# Patient Record
Sex: FEMALE | Race: WHITE | Employment: OTHER | URBAN - NONMETROPOLITAN AREA
[De-identification: names, ages, dates, MRNs, and addresses within clinical notes are randomized per-mention and may not be internally consistent; named-entity substitution may affect disease eponyms.]

---

## 2017-01-09 ENCOUNTER — TELEPHONE (OUTPATIENT)
Dept: FAMILY MEDICINE CLINIC | Age: 67
End: 2017-01-09

## 2017-01-10 ENCOUNTER — OFFICE VISIT (OUTPATIENT)
Dept: FAMILY MEDICINE CLINIC | Age: 67
End: 2017-01-10

## 2017-01-10 VITALS
RESPIRATION RATE: 18 BRPM | OXYGEN SATURATION: 95 % | TEMPERATURE: 97.7 F | BODY MASS INDEX: 33.01 KG/M2 | SYSTOLIC BLOOD PRESSURE: 132 MMHG | DIASTOLIC BLOOD PRESSURE: 82 MMHG | HEIGHT: 68 IN | WEIGHT: 217.8 LBS | HEART RATE: 71 BPM

## 2017-01-10 DIAGNOSIS — J01.41 ACUTE RECURRENT PANSINUSITIS: Primary | ICD-10-CM

## 2017-01-10 DIAGNOSIS — J40 BRONCHITIS: ICD-10-CM

## 2017-01-10 DIAGNOSIS — R05.3 CHRONIC COUGH: ICD-10-CM

## 2017-01-10 PROCEDURE — 99213 OFFICE O/P EST LOW 20 MIN: CPT | Performed by: FAMILY MEDICINE

## 2017-01-10 RX ORDER — MULTIVIT WITH MINERALS/LUTEIN
1000 TABLET ORAL EVERY OTHER DAY
COMMUNITY
End: 2018-05-29

## 2017-01-10 RX ORDER — PREDNISONE 20 MG/1
40 TABLET ORAL DAILY
Qty: 10 TABLET | Refills: 0 | Status: SHIPPED | OUTPATIENT
Start: 2017-01-10 | End: 2017-01-15

## 2017-01-10 RX ORDER — AMOXICILLIN 500 MG/1
500 CAPSULE ORAL 2 TIMES DAILY
Qty: 20 CAPSULE | Refills: 0 | Status: SHIPPED | OUTPATIENT
Start: 2017-01-10 | End: 2017-04-24 | Stop reason: ALTCHOICE

## 2017-01-10 ASSESSMENT — ENCOUNTER SYMPTOMS
CONSTIPATION: 0
SHORTNESS OF BREATH: 1
CHOKING: 0
COUGH: 1
DIARRHEA: 0
SINUS PRESSURE: 1
CHEST TIGHTNESS: 0
NAUSEA: 0
WHEEZING: 1
SORE THROAT: 1
TROUBLE SWALLOWING: 0

## 2017-04-24 ENCOUNTER — OFFICE VISIT (OUTPATIENT)
Dept: PRIMARY CARE CLINIC | Age: 67
End: 2017-04-24
Payer: MEDICARE

## 2017-04-24 VITALS
SYSTOLIC BLOOD PRESSURE: 120 MMHG | HEIGHT: 68 IN | RESPIRATION RATE: 16 BRPM | WEIGHT: 202 LBS | TEMPERATURE: 97.8 F | HEART RATE: 74 BPM | OXYGEN SATURATION: 94 % | BODY MASS INDEX: 30.62 KG/M2 | DIASTOLIC BLOOD PRESSURE: 80 MMHG

## 2017-04-24 DIAGNOSIS — G43.101 MIGRAINE WITH AURA AND WITH STATUS MIGRAINOSUS, NOT INTRACTABLE: Primary | ICD-10-CM

## 2017-04-24 PROCEDURE — 96372 THER/PROPH/DIAG INJ SC/IM: CPT | Performed by: NURSE PRACTITIONER

## 2017-04-24 PROCEDURE — 99213 OFFICE O/P EST LOW 20 MIN: CPT | Performed by: NURSE PRACTITIONER

## 2017-04-24 RX ORDER — PROMETHAZINE HYDROCHLORIDE 25 MG/ML
25 INJECTION, SOLUTION INTRAMUSCULAR; INTRAVENOUS ONCE
Status: COMPLETED | OUTPATIENT
Start: 2017-04-24 | End: 2017-04-24

## 2017-04-24 RX ORDER — KETOROLAC TROMETHAMINE 30 MG/ML
60 INJECTION, SOLUTION INTRAMUSCULAR; INTRAVENOUS ONCE
Status: COMPLETED | OUTPATIENT
Start: 2017-04-24 | End: 2017-04-24

## 2017-04-24 RX ADMIN — PROMETHAZINE HYDROCHLORIDE 25 MG: 25 INJECTION, SOLUTION INTRAMUSCULAR; INTRAVENOUS at 17:56

## 2017-04-24 RX ADMIN — KETOROLAC TROMETHAMINE 60 MG: 30 INJECTION, SOLUTION INTRAMUSCULAR; INTRAVENOUS at 17:55

## 2017-04-24 ASSESSMENT — ENCOUNTER SYMPTOMS
RESPIRATORY NEGATIVE: 1
NAUSEA: 1
PHOTOPHOBIA: 1

## 2017-06-07 ENCOUNTER — OFFICE VISIT (OUTPATIENT)
Dept: FAMILY MEDICINE CLINIC | Age: 67
End: 2017-06-07
Payer: MEDICARE

## 2017-06-07 VITALS
BODY MASS INDEX: 31.67 KG/M2 | HEART RATE: 61 BPM | HEIGHT: 68 IN | DIASTOLIC BLOOD PRESSURE: 62 MMHG | SYSTOLIC BLOOD PRESSURE: 96 MMHG | WEIGHT: 209 LBS | OXYGEN SATURATION: 98 % | TEMPERATURE: 98.1 F

## 2017-06-07 DIAGNOSIS — Z12.31 ENCOUNTER FOR SCREENING MAMMOGRAM FOR MALIGNANT NEOPLASM OF BREAST: ICD-10-CM

## 2017-06-07 DIAGNOSIS — E55.9 VITAMIN D DEFICIENCY: ICD-10-CM

## 2017-06-07 DIAGNOSIS — Z13.6 SCREENING FOR CARDIOVASCULAR CONDITION: ICD-10-CM

## 2017-06-07 DIAGNOSIS — R73.01 IMPAIRED FASTING GLUCOSE: ICD-10-CM

## 2017-06-07 DIAGNOSIS — Z91.81 AT HIGH RISK FOR FALLS: ICD-10-CM

## 2017-06-07 DIAGNOSIS — J30.1 SEASONAL ALLERGIC RHINITIS DUE TO POLLEN: ICD-10-CM

## 2017-06-07 DIAGNOSIS — Z13.820 SCREENING FOR OSTEOPOROSIS: ICD-10-CM

## 2017-06-07 DIAGNOSIS — K21.9 GASTROESOPHAGEAL REFLUX DISEASE, ESOPHAGITIS PRESENCE NOT SPECIFIED: Primary | ICD-10-CM

## 2017-06-07 PROCEDURE — 3014F SCREEN MAMMO DOC REV: CPT | Performed by: FAMILY MEDICINE

## 2017-06-07 PROCEDURE — G8427 DOCREV CUR MEDS BY ELIG CLIN: HCPCS | Performed by: FAMILY MEDICINE

## 2017-06-07 PROCEDURE — G8400 PT W/DXA NO RESULTS DOC: HCPCS | Performed by: FAMILY MEDICINE

## 2017-06-07 PROCEDURE — 1123F ACP DISCUSS/DSCN MKR DOCD: CPT | Performed by: FAMILY MEDICINE

## 2017-06-07 PROCEDURE — 3017F COLORECTAL CA SCREEN DOC REV: CPT | Performed by: FAMILY MEDICINE

## 2017-06-07 PROCEDURE — G0009 ADMIN PNEUMOCOCCAL VACCINE: HCPCS | Performed by: FAMILY MEDICINE

## 2017-06-07 PROCEDURE — 4040F PNEUMOC VAC/ADMIN/RCVD: CPT | Performed by: FAMILY MEDICINE

## 2017-06-07 PROCEDURE — 99214 OFFICE O/P EST MOD 30 MIN: CPT | Performed by: FAMILY MEDICINE

## 2017-06-07 PROCEDURE — 90670 PCV13 VACCINE IM: CPT | Performed by: FAMILY MEDICINE

## 2017-06-07 PROCEDURE — 1036F TOBACCO NON-USER: CPT | Performed by: FAMILY MEDICINE

## 2017-06-07 PROCEDURE — 1090F PRES/ABSN URINE INCON ASSESS: CPT | Performed by: FAMILY MEDICINE

## 2017-06-07 PROCEDURE — G8417 CALC BMI ABV UP PARAM F/U: HCPCS | Performed by: FAMILY MEDICINE

## 2017-06-07 RX ORDER — MONTELUKAST SODIUM 10 MG/1
10 TABLET ORAL NIGHTLY
Qty: 30 TABLET | Refills: 3 | Status: SHIPPED | OUTPATIENT
Start: 2017-06-07 | End: 2017-10-20 | Stop reason: SDUPTHER

## 2017-06-07 RX ORDER — MULTIVITAMIN WITH IRON
250 TABLET ORAL DAILY
COMMUNITY
End: 2018-05-29

## 2017-06-07 RX ORDER — ASCORBIC ACID 500 MG
500 TABLET ORAL DAILY
Status: ON HOLD | COMMUNITY
End: 2019-03-12 | Stop reason: HOSPADM

## 2017-06-07 RX ORDER — ASCORBIC ACID 1000 MG
TABLET ORAL
COMMUNITY
End: 2018-05-29

## 2017-06-07 ASSESSMENT — ENCOUNTER SYMPTOMS
CHOKING: 0
WHEEZING: 0
NAUSEA: 0
SHORTNESS OF BREATH: 0
ABDOMINAL PAIN: 0
TROUBLE SWALLOWING: 0
COUGH: 1
DIARRHEA: 0
CONSTIPATION: 0
CHEST TIGHTNESS: 0
SINUS PRESSURE: 1
SORE THROAT: 1

## 2017-06-20 DIAGNOSIS — E78.2 MIXED HYPERLIPIDEMIA: Primary | ICD-10-CM

## 2017-07-31 ENCOUNTER — TELEPHONE (OUTPATIENT)
Dept: FAMILY MEDICINE CLINIC | Age: 67
End: 2017-07-31

## 2017-09-19 ENCOUNTER — TELEPHONE (OUTPATIENT)
Dept: PRIMARY CARE CLINIC | Age: 67
End: 2017-09-19

## 2017-09-29 ENCOUNTER — HOSPITAL ENCOUNTER (OUTPATIENT)
Age: 67
Setting detail: SPECIMEN
Discharge: HOME | End: 2017-09-29
Payer: MEDICARE

## 2017-09-29 ENCOUNTER — OFFICE VISIT (OUTPATIENT)
Dept: FAMILY MEDICINE CLINIC | Age: 67
End: 2017-09-29
Payer: MEDICARE

## 2017-09-29 VITALS
OXYGEN SATURATION: 97 % | WEIGHT: 207 LBS | TEMPERATURE: 98.4 F | HEART RATE: 77 BPM | BODY MASS INDEX: 31.37 KG/M2 | DIASTOLIC BLOOD PRESSURE: 70 MMHG | HEIGHT: 68 IN | SYSTOLIC BLOOD PRESSURE: 118 MMHG

## 2017-09-29 DIAGNOSIS — R19.7 DIARRHEA OF PRESUMED INFECTIOUS ORIGIN: ICD-10-CM

## 2017-09-29 DIAGNOSIS — R10.2 SUPRAPUBIC PAIN, ACUTE: ICD-10-CM

## 2017-09-29 DIAGNOSIS — F33.1 MODERATE EPISODE OF RECURRENT MAJOR DEPRESSIVE DISORDER (HCC): ICD-10-CM

## 2017-09-29 DIAGNOSIS — R10.2 SUPRAPUBIC PAIN, ACUTE: Primary | ICD-10-CM

## 2017-09-29 DIAGNOSIS — N30.01 ACUTE CYSTITIS WITH HEMATURIA: Primary | ICD-10-CM

## 2017-09-29 LAB
-: ABNORMAL
AMORPHOUS: ABNORMAL
BACTERIA: ABNORMAL
BILIRUBIN URINE: ABNORMAL
CASTS UA: ABNORMAL /LPF (ref 0–2)
COLOR: ABNORMAL
COMMENT UA: ABNORMAL
CRYSTALS, UA: ABNORMAL /HPF
EPITHELIAL CELLS UA: ABNORMAL /HPF (ref 0–5)
GLUCOSE URINE: NEGATIVE
KETONES, URINE: ABNORMAL
LEUKOCYTE ESTERASE, URINE: ABNORMAL
MUCUS: ABNORMAL
NITRITE, URINE: NEGATIVE
OTHER OBSERVATIONS UA: ABNORMAL
PH UA: 5.5 (ref 5–6)
PROTEIN UA: ABNORMAL
RBC UA: ABNORMAL /HPF (ref 0–4)
RENAL EPITHELIAL, UA: ABNORMAL /HPF
SPECIFIC GRAVITY UA: 1.03 (ref 1.01–1.02)
TRICHOMONAS: ABNORMAL
TURBIDITY: ABNORMAL
URINE HGB: ABNORMAL
UROBILINOGEN, URINE: NORMAL
WBC UA: ABNORMAL /HPF (ref 0–4)
YEAST: ABNORMAL

## 2017-09-29 PROCEDURE — 81001 URINALYSIS AUTO W/SCOPE: CPT

## 2017-09-29 PROCEDURE — 1036F TOBACCO NON-USER: CPT | Performed by: FAMILY MEDICINE

## 2017-09-29 PROCEDURE — 1123F ACP DISCUSS/DSCN MKR DOCD: CPT | Performed by: FAMILY MEDICINE

## 2017-09-29 PROCEDURE — 3014F SCREEN MAMMO DOC REV: CPT | Performed by: FAMILY MEDICINE

## 2017-09-29 PROCEDURE — 3017F COLORECTAL CA SCREEN DOC REV: CPT | Performed by: FAMILY MEDICINE

## 2017-09-29 PROCEDURE — G8417 CALC BMI ABV UP PARAM F/U: HCPCS | Performed by: FAMILY MEDICINE

## 2017-09-29 PROCEDURE — G8427 DOCREV CUR MEDS BY ELIG CLIN: HCPCS | Performed by: FAMILY MEDICINE

## 2017-09-29 PROCEDURE — 4040F PNEUMOC VAC/ADMIN/RCVD: CPT | Performed by: FAMILY MEDICINE

## 2017-09-29 PROCEDURE — 99214 OFFICE O/P EST MOD 30 MIN: CPT | Performed by: FAMILY MEDICINE

## 2017-09-29 PROCEDURE — G8400 PT W/DXA NO RESULTS DOC: HCPCS | Performed by: FAMILY MEDICINE

## 2017-09-29 PROCEDURE — 1090F PRES/ABSN URINE INCON ASSESS: CPT | Performed by: FAMILY MEDICINE

## 2017-09-29 RX ORDER — NITROFURANTOIN 25; 75 MG/1; MG/1
100 CAPSULE ORAL 2 TIMES DAILY
Qty: 14 CAPSULE | Refills: 0 | Status: SHIPPED | OUTPATIENT
Start: 2017-09-29 | End: 2017-10-06

## 2017-09-29 RX ORDER — FLUOXETINE 10 MG/1
10 CAPSULE ORAL DAILY
Qty: 30 CAPSULE | Refills: 1 | Status: SHIPPED | OUTPATIENT
Start: 2017-09-29 | End: 2017-10-31 | Stop reason: SDUPTHER

## 2017-09-29 RX ORDER — CHOLESTYRAMINE 4 G/9G
1 POWDER, FOR SUSPENSION ORAL 2 TIMES DAILY
Qty: 90 PACKET | Refills: 3 | Status: SHIPPED | OUTPATIENT
Start: 2017-09-29 | End: 2017-10-31

## 2017-09-29 ASSESSMENT — ENCOUNTER SYMPTOMS
ABDOMINAL PAIN: 1
WHEEZING: 0
SHORTNESS OF BREATH: 0
HEMATOCHEZIA: 0
COUGH: 0
VOMITING: 1
CONSTIPATION: 0
DIARRHEA: 1
CHEST TIGHTNESS: 0
NAUSEA: 1

## 2017-10-03 ENCOUNTER — TELEPHONE (OUTPATIENT)
Dept: FAMILY MEDICINE CLINIC | Age: 67
End: 2017-10-03

## 2017-10-03 ENCOUNTER — HOSPITAL ENCOUNTER (OUTPATIENT)
Dept: LAB | Age: 67
Setting detail: SPECIMEN
Discharge: HOME | End: 2017-10-03
Payer: MEDICARE

## 2017-10-03 DIAGNOSIS — R19.7 DIARRHEA OF PRESUMED INFECTIOUS ORIGIN: Primary | ICD-10-CM

## 2017-10-03 DIAGNOSIS — R19.7 DIARRHEA OF PRESUMED INFECTIOUS ORIGIN: ICD-10-CM

## 2017-10-03 LAB
DIRECT EXAM: NORMAL
Lab: NORMAL
SPECIMEN DESCRIPTION: NORMAL
STATUS: NORMAL

## 2017-10-03 PROCEDURE — 87493 C DIFF AMPLIFIED PROBE: CPT

## 2017-10-03 PROCEDURE — 87505 NFCT AGENT DETECTION GI: CPT

## 2017-10-03 NOTE — TELEPHONE ENCOUNTER
That's miserable. I will order a stool culture to make sure it is not C.diff.  If it is not I will recommend immodium

## 2017-10-04 LAB
CAMPYLOBACTER PCR: NORMAL
SALMONELLA PCR: NORMAL
SHIGATOXIN GENE PCR: NORMAL
SHIGELLA SP PCR: NORMAL
SPECIMEN: NORMAL

## 2017-10-20 RX ORDER — MONTELUKAST SODIUM 10 MG/1
TABLET ORAL
Qty: 30 TABLET | Refills: 3 | Status: SHIPPED | OUTPATIENT
Start: 2017-10-20 | End: 2017-12-01 | Stop reason: SDUPTHER

## 2017-10-31 ENCOUNTER — HOSPITAL ENCOUNTER (OUTPATIENT)
Age: 67
Setting detail: SPECIMEN
Discharge: HOME | End: 2017-10-31
Payer: MEDICARE

## 2017-10-31 ENCOUNTER — OFFICE VISIT (OUTPATIENT)
Dept: FAMILY MEDICINE CLINIC | Age: 67
End: 2017-10-31
Payer: MEDICARE

## 2017-10-31 VITALS
SYSTOLIC BLOOD PRESSURE: 118 MMHG | OXYGEN SATURATION: 98 % | WEIGHT: 212 LBS | DIASTOLIC BLOOD PRESSURE: 82 MMHG | BODY MASS INDEX: 32.13 KG/M2 | HEART RATE: 66 BPM | HEIGHT: 68 IN | TEMPERATURE: 98.2 F

## 2017-10-31 DIAGNOSIS — F33.1 MODERATE EPISODE OF RECURRENT MAJOR DEPRESSIVE DISORDER (HCC): Primary | ICD-10-CM

## 2017-10-31 DIAGNOSIS — N30.00 ACUTE CYSTITIS WITHOUT HEMATURIA: ICD-10-CM

## 2017-10-31 DIAGNOSIS — R30.0 DYSURIA: ICD-10-CM

## 2017-10-31 DIAGNOSIS — E78.2 MIXED HYPERLIPIDEMIA: ICD-10-CM

## 2017-10-31 LAB
-: ABNORMAL
AMORPHOUS: ABNORMAL
BACTERIA: ABNORMAL
BILIRUBIN URINE: NEGATIVE
CASTS UA: ABNORMAL /LPF (ref 0–2)
COLOR: ABNORMAL
COMMENT UA: ABNORMAL
CRYSTALS, UA: ABNORMAL /HPF
EPITHELIAL CELLS UA: ABNORMAL /HPF (ref 0–5)
GLUCOSE URINE: NEGATIVE
KETONES, URINE: NEGATIVE
LEUKOCYTE ESTERASE, URINE: ABNORMAL
MUCUS: ABNORMAL
NITRITE, URINE: NEGATIVE
OTHER OBSERVATIONS UA: ABNORMAL
PH UA: 6 (ref 5–6)
PROTEIN UA: NEGATIVE
RBC UA: ABNORMAL /HPF (ref 0–4)
RENAL EPITHELIAL, UA: ABNORMAL /HPF
SPECIFIC GRAVITY UA: 1.01 (ref 1.01–1.02)
TRICHOMONAS: ABNORMAL
TURBIDITY: ABNORMAL
URINE HGB: NEGATIVE
UROBILINOGEN, URINE: NORMAL
WBC UA: ABNORMAL /HPF (ref 0–4)
YEAST: ABNORMAL

## 2017-10-31 PROCEDURE — 4040F PNEUMOC VAC/ADMIN/RCVD: CPT | Performed by: FAMILY MEDICINE

## 2017-10-31 PROCEDURE — 1036F TOBACCO NON-USER: CPT | Performed by: FAMILY MEDICINE

## 2017-10-31 PROCEDURE — 87086 URINE CULTURE/COLONY COUNT: CPT

## 2017-10-31 PROCEDURE — G8417 CALC BMI ABV UP PARAM F/U: HCPCS | Performed by: FAMILY MEDICINE

## 2017-10-31 PROCEDURE — 1123F ACP DISCUSS/DSCN MKR DOCD: CPT | Performed by: FAMILY MEDICINE

## 2017-10-31 PROCEDURE — 99214 OFFICE O/P EST MOD 30 MIN: CPT | Performed by: FAMILY MEDICINE

## 2017-10-31 PROCEDURE — G8427 DOCREV CUR MEDS BY ELIG CLIN: HCPCS | Performed by: FAMILY MEDICINE

## 2017-10-31 PROCEDURE — 81001 URINALYSIS AUTO W/SCOPE: CPT

## 2017-10-31 PROCEDURE — G8484 FLU IMMUNIZE NO ADMIN: HCPCS | Performed by: FAMILY MEDICINE

## 2017-10-31 PROCEDURE — 3014F SCREEN MAMMO DOC REV: CPT | Performed by: FAMILY MEDICINE

## 2017-10-31 PROCEDURE — 3017F COLORECTAL CA SCREEN DOC REV: CPT | Performed by: FAMILY MEDICINE

## 2017-10-31 PROCEDURE — G8400 PT W/DXA NO RESULTS DOC: HCPCS | Performed by: FAMILY MEDICINE

## 2017-10-31 PROCEDURE — 1090F PRES/ABSN URINE INCON ASSESS: CPT | Performed by: FAMILY MEDICINE

## 2017-10-31 RX ORDER — FLUOXETINE HYDROCHLORIDE 20 MG/1
20 CAPSULE ORAL DAILY
Qty: 30 CAPSULE | Refills: 3 | Status: SHIPPED | OUTPATIENT
Start: 2017-10-31 | End: 2017-12-01 | Stop reason: SDUPTHER

## 2017-10-31 RX ORDER — BUPROPION HYDROCHLORIDE 150 MG/1
150 TABLET ORAL EVERY MORNING
Qty: 30 TABLET | Refills: 3 | Status: SHIPPED | OUTPATIENT
Start: 2017-10-31 | End: 2017-12-01 | Stop reason: SDUPTHER

## 2017-10-31 RX ORDER — NITROFURANTOIN 25; 75 MG/1; MG/1
100 CAPSULE ORAL 2 TIMES DAILY
Qty: 14 CAPSULE | Refills: 0 | Status: SHIPPED | OUTPATIENT
Start: 2017-10-31 | End: 2017-11-07

## 2017-10-31 ASSESSMENT — ENCOUNTER SYMPTOMS
WHEEZING: 0
COUGH: 0
SHORTNESS OF BREATH: 0
CHEST TIGHTNESS: 0
CONSTIPATION: 0
DIARRHEA: 0
NAUSEA: 0
ABDOMINAL PAIN: 1

## 2017-10-31 NOTE — PROGRESS NOTES
palpitations     Irregular bowel habits     Macular degeneration     beginning    Measles     Osteoarthritis     Restless legs syndrome     Tachycardia     Unspecified sleep apnea           Social History   Substance Use Topics    Smoking status: Former Smoker     Types: Cigarettes     Quit date: 11/7/1993    Smokeless tobacco: Never Used    Alcohol use 1.2 oz/week     2 Glasses of wine per week      Current Outpatient Prescriptions   Medication Sig Dispense Refill    NONFORMULARY Take 2 capsules by mouth 3 times daily (with meals)      Lactobacillus (ACIDOPHILUS/BIFIDUS PO) Take 2 capsules by mouth      FLUoxetine (PROZAC) 20 MG capsule Take 1 capsule by mouth daily 30 capsule 3    buPROPion (WELLBUTRIN XL) 150 MG extended release tablet Take 1 tablet by mouth every morning 30 tablet 3    nitrofurantoin, macrocrystal-monohydrate, (MACROBID) 100 MG capsule Take 1 capsule by mouth 2 times daily for 7 days 14 capsule 0    montelukast (SINGULAIR) 10 MG tablet take 1 tablet by mouth every evening 30 tablet 3    pantoprazole (PROTONIX) 40 MG tablet Take 1 tablet by mouth daily 90 tablet 3    fexofenadine (ALLEGRA ALLERGY) 180 MG tablet Take 180 mg by mouth daily      Multiple Vitamins-Minerals (PRESERVISION AREDS 2 PO) Take 1 tablet by mouth daily      fluticasone (FLONASE) 50 MCG/ACT nasal spray by Nasal route      ibuprofen (ADVIL;MOTRIN) 800 MG tablet Take 1 tablet by mouth every 8 hours as needed for Pain 30 tablet 0    cholestyramine (QUESTRAN) 4 g packet Take 1 packet by mouth 2 times daily 90 packet 3    Ginkgo Biloba 40 MG TABS Take by mouth      Ascorbic Acid (VITAMIN C) 500 MG tablet Take 500 mg by mouth daily      magnesium (MAGNESIUM-OXIDE) 250 MG TABS tablet Take 250 mg by mouth daily      MILK THISTLE PO Take by mouth daily      vitamin E 1000 UNITS capsule Take 1,000 Units by mouth every other day       Garlic 4040 MG CAPS Take by mouth      albuterol sulfate  (90 BASE) MCG/ACT inhaler Inhale 2 puffs into the lungs as needed for Wheezing      aspirin-acetaminophen-caffeine (EXCEDRIN MIGRAINE) 250-250-65 MG per tablet Take 1 tablet by mouth as needed for Headaches      Multiple Vitamins-Minerals (HAIR/SKIN/NAILS) TABS Take by mouth      Acetaminophen (TYLENOL PO) Take by mouth      Hypertonic Nasal Wash (SINUS RINSE NA) by Nasal route.  Diphenhydramine-APAP, sleep, (TYLENOL PM EXTRA STRENGTH)  MG TABS Take  by mouth as needed. No current facility-administered medications for this visit. Allergies   Allergen Reactions    Codeine Other (See Comments)     Gives her a headache    Celebrex [Celecoxib] Rash     Whole body       Subjective:      Review of Systems   Constitutional: Positive for activity change, appetite change (increased) and fatigue. Negative for fever. Eyes: Negative for visual disturbance. Respiratory: Negative for cough, chest tightness, shortness of breath and wheezing. Cardiovascular: Negative for chest pain, palpitations and leg swelling. Gastrointestinal: Positive for abdominal pain. Negative for constipation, diarrhea and nausea. Genitourinary: Positive for dysuria and hesitancy. Negative for frequency and urgency. Skin: Negative for rash. Neurological: Negative for dizziness, syncope and headaches. Psychiatric/Behavioral: Positive for decreased concentration and dysphoric mood. Negative for hallucinations, self-injury, sleep disturbance and suicidal ideas. The patient is not nervous/anxious. Objective:     Physical Exam   Constitutional: She is oriented to person, place, and time. She appears well-developed and well-nourished. Neck: Normal range of motion. Neck supple. Cardiovascular: Normal rate, regular rhythm, normal heart sounds and intact distal pulses. No murmur heard. Pulmonary/Chest: Effort normal and breath sounds normal. No respiratory distress. She has no wheezes. Abdominal: Soft.  Bowel

## 2017-11-01 LAB
CULTURE: NORMAL
CULTURE: NORMAL
Lab: NORMAL
SPECIMEN DESCRIPTION: NORMAL
SPECIMEN DESCRIPTION: NORMAL
STATUS: NORMAL

## 2017-11-08 ENCOUNTER — TELEPHONE (OUTPATIENT)
Dept: FAMILY MEDICINE CLINIC | Age: 67
End: 2017-11-08

## 2017-11-08 NOTE — TELEPHONE ENCOUNTER
Pt calling stating she's been seen a couple of times for a UTI and was told to call if no improvement, pt calling today stating she still has a lot of cramping, and pain when first starting to urinate, do you want to see or call something in, pt uses above pharmacy, please advise at above number.

## 2017-11-09 ENCOUNTER — HOSPITAL ENCOUNTER (OUTPATIENT)
Age: 67
Setting detail: SPECIMEN
Discharge: HOME | End: 2017-11-09
Payer: MEDICARE

## 2017-11-09 ENCOUNTER — OFFICE VISIT (OUTPATIENT)
Dept: FAMILY MEDICINE CLINIC | Age: 67
End: 2017-11-09
Payer: MEDICARE

## 2017-11-09 VITALS
DIASTOLIC BLOOD PRESSURE: 84 MMHG | SYSTOLIC BLOOD PRESSURE: 122 MMHG | HEART RATE: 72 BPM | WEIGHT: 210.4 LBS | TEMPERATURE: 98.1 F | BODY MASS INDEX: 31.89 KG/M2 | HEIGHT: 68 IN | OXYGEN SATURATION: 98 %

## 2017-11-09 DIAGNOSIS — N89.8 VAGINAL DISCHARGE: ICD-10-CM

## 2017-11-09 DIAGNOSIS — N30.10 INTERSTITIAL CYSTITIS: Primary | ICD-10-CM

## 2017-11-09 DIAGNOSIS — N81.4 UTERINE PROLAPSE: ICD-10-CM

## 2017-11-09 PROCEDURE — G8484 FLU IMMUNIZE NO ADMIN: HCPCS | Performed by: FAMILY MEDICINE

## 2017-11-09 PROCEDURE — 4040F PNEUMOC VAC/ADMIN/RCVD: CPT | Performed by: FAMILY MEDICINE

## 2017-11-09 PROCEDURE — 1090F PRES/ABSN URINE INCON ASSESS: CPT | Performed by: FAMILY MEDICINE

## 2017-11-09 PROCEDURE — 3014F SCREEN MAMMO DOC REV: CPT | Performed by: FAMILY MEDICINE

## 2017-11-09 PROCEDURE — G8417 CALC BMI ABV UP PARAM F/U: HCPCS | Performed by: FAMILY MEDICINE

## 2017-11-09 PROCEDURE — G8427 DOCREV CUR MEDS BY ELIG CLIN: HCPCS | Performed by: FAMILY MEDICINE

## 2017-11-09 PROCEDURE — 1036F TOBACCO NON-USER: CPT | Performed by: FAMILY MEDICINE

## 2017-11-09 PROCEDURE — 1123F ACP DISCUSS/DSCN MKR DOCD: CPT | Performed by: FAMILY MEDICINE

## 2017-11-09 PROCEDURE — 99214 OFFICE O/P EST MOD 30 MIN: CPT | Performed by: FAMILY MEDICINE

## 2017-11-09 PROCEDURE — 87070 CULTURE OTHR SPECIMN AEROBIC: CPT

## 2017-11-09 PROCEDURE — 3017F COLORECTAL CA SCREEN DOC REV: CPT | Performed by: FAMILY MEDICINE

## 2017-11-09 PROCEDURE — G8400 PT W/DXA NO RESULTS DOC: HCPCS | Performed by: FAMILY MEDICINE

## 2017-11-09 RX ORDER — HYDROXYZINE HYDROCHLORIDE 25 MG/1
25 TABLET, FILM COATED ORAL NIGHTLY PRN
Qty: 30 TABLET | Refills: 0 | Status: SHIPPED | OUTPATIENT
Start: 2017-11-09 | End: 2017-12-01 | Stop reason: SDUPTHER

## 2017-11-09 ASSESSMENT — ENCOUNTER SYMPTOMS
DIARRHEA: 0
COUGH: 0
ABDOMINAL PAIN: 1
CHEST TIGHTNESS: 0
SHORTNESS OF BREATH: 0
NAUSEA: 0
CONSTIPATION: 0
WHEEZING: 0

## 2017-11-09 NOTE — PROGRESS NOTES
[address] [facility]  Dept: [phone]  Dept Fax: [phone]  Loc: [phone]    Saray Lea is a 75 y.o. female who presents today for her medical conditions/complaints as noted below. Saray Lea is c/o of   Chief Complaint   Patient presents with    Abdominal Pain     was told by a doctor 5-6 years ago she had interstial cystitis- sxs went away and had no other problems-     Uterine Prolapse     wants to discuss with you    Other     she remembered the reason for taking the cardizem was for her tachycardia       HPI:     HPI Here today for continued abdominal pain. She is still having lower abdominal pain that is constant. The pain was really bad two nights ago. She has intermittent burning with urination. No blood in the urine. She has a history of interstitial cystitis. She also has uterine prolapse that has recently worsened. She has seen gyn for that in the past and she was recommended to have a pessary but she was not interested then. Food does not affect her abdominal pain. She does not have any new sexual partners and her  has been faithful. She remembered that her cardizem was for her palpitations. She had stopped taking when she stopped taking her vitamins due to diarrhea. The palpitations are worse when she is anxious, but have overall been very manageable.        Past Medical History:   Diagnosis Date    Allergic rhinitis     Anxiety     Asthma     Bronchitis     Chest pain     Chronic back pain     Depression     Fibromyalgia     GERD (gastroesophageal reflux disease)     Headache(784.0)     Heart palpitations     Irregular bowel habits     Macular degeneration     beginning    Measles     Osteoarthritis     Restless legs syndrome     Tachycardia     Unspecified sleep apnea           Social History   Substance Use Topics    Smoking status: Former Smoker     Types: Cigarettes mouth as needed. No current facility-administered medications for this visit. Allergies   Allergen Reactions    Codeine Other (See Comments)     Gives her a headache    Celebrex [Celecoxib] Rash     Whole body       Subjective:      Review of Systems   Constitutional: Negative for activity change, appetite change, fatigue and fever. Eyes: Negative for visual disturbance. Respiratory: Negative for cough, chest tightness, shortness of breath and wheezing. Cardiovascular: Negative for chest pain, palpitations and leg swelling. Gastrointestinal: Positive for abdominal pain. Negative for constipation, diarrhea and nausea. Genitourinary: Positive for dysuria. Negative for frequency and urgency. Skin: Negative for rash. Neurological: Negative for dizziness, syncope and headaches. Psychiatric/Behavioral: Positive for decreased concentration, dysphoric mood (improving) and sleep disturbance. Negative for hallucinations, self-injury and suicidal ideas. The patient is not nervous/anxious. Objective:     Physical Exam   Constitutional: She is oriented to person, place, and time. She appears well-developed and well-nourished. No distress. Eyes: Conjunctivae and EOM are normal. Pupils are equal, round, and reactive to light. Neck: Normal range of motion. Neck supple. Cardiovascular: Normal rate, regular rhythm, normal heart sounds and intact distal pulses. No murmur heard. Pulmonary/Chest: Effort normal and breath sounds normal. No respiratory distress. She has no wheezes. Abdominal: Soft. Bowel sounds are normal. There is tenderness in the left lower quadrant. There is no rebound and no guarding. Genitourinary: Vagina normal. There is no rash, tenderness, lesion or injury on the right labia. There is no rash, tenderness, lesion or injury on the left labia. Cervix exhibits no motion tenderness, no discharge and no friability.    Genitourinary Comments: Uterine prolapse present Musculoskeletal: She exhibits no edema. Lymphadenopathy:     She has no cervical adenopathy. Neurological: She is alert and oriented to person, place, and time. Nursing note and vitals reviewed. /84   Pulse 72   Temp 98.1 °F (36.7 °C) (Tympanic)   Ht 5' 8\" (1.727 m)   Wt 210 lb 6.4 oz (95.4 kg)   SpO2 98%   BMI 31.99 kg/m²     Assessment:      1. Interstitial cystitis     2. Uterine prolapse  Aristides Haddad CNP, Gynecology [place]   3. Vaginal discharge  Genital Culture,  Limited             Plan:       Interstitial cystitis: new; likely the cause of her pain and her dysuria. I will treat with hydroxazine at night. I also did a vaginal culture to look for BV causing her symptoms. Uterine prolapse: new; I will refer her to gyn for further recommendations including possible pessary vs surgery. Return if symptoms worsen or fail to improve. Orders Placed This Encounter   Procedures    Genital Culture,  Limited     Standing Status:   Future     Number of Occurrences:   1     Standing Expiration Date:   11/9/2018   [facility] Karthik Martin CNP, Gynecology [place]     Referral Priority:   Routine     Referral Type:   Consult for Advice and Opinion     Referral Reason:   Specialty Services Required     Referred to Provider:   Kumar Gonsalez CNP     Requested Specialty:   Nurse Practitioner     Number of Visits Requested:   1     Orders Placed This Encounter   Medications    hydrOXYzine (ATARAX) 25 MG tablet     Sig: Take 1 tablet by mouth nightly as needed (intersitial cystitis)     Dispense:  30 tablet     Refill:  0       Patient given educational materials - see patient instructions. Discussed use, benefit, and side effects of prescribed medications. All patient questions answered. Pt voiced understanding. Reviewed health maintenance. Instructed to continue current medications, diet and exercise. Patient agreed with treatment plan. Follow up as directed. Electronically signed by Arlet Quinones MD on 11/9/2017 at 1:36 PM

## 2017-11-16 ENCOUNTER — HOSPITAL ENCOUNTER (OUTPATIENT)
Age: 67
Setting detail: SPECIMEN
Discharge: HOME | End: 2017-11-16
Payer: MEDICARE

## 2017-11-16 ENCOUNTER — OFFICE VISIT (OUTPATIENT)
Dept: OBGYN | Age: 67
End: 2017-11-16
Payer: MEDICARE

## 2017-11-16 VITALS
DIASTOLIC BLOOD PRESSURE: 68 MMHG | HEIGHT: 68 IN | RESPIRATION RATE: 16 BRPM | BODY MASS INDEX: 31.83 KG/M2 | HEART RATE: 76 BPM | SYSTOLIC BLOOD PRESSURE: 120 MMHG | WEIGHT: 210 LBS

## 2017-11-16 DIAGNOSIS — N81.89 PELVIC RELAXATION DISORDER: ICD-10-CM

## 2017-11-16 DIAGNOSIS — N81.89 PELVIC RELAXATION DISORDER: Primary | ICD-10-CM

## 2017-11-16 PROCEDURE — G8484 FLU IMMUNIZE NO ADMIN: HCPCS | Performed by: OBSTETRICS & GYNECOLOGY

## 2017-11-16 PROCEDURE — 1123F ACP DISCUSS/DSCN MKR DOCD: CPT | Performed by: OBSTETRICS & GYNECOLOGY

## 2017-11-16 PROCEDURE — 1090F PRES/ABSN URINE INCON ASSESS: CPT | Performed by: OBSTETRICS & GYNECOLOGY

## 2017-11-16 PROCEDURE — 1036F TOBACCO NON-USER: CPT | Performed by: OBSTETRICS & GYNECOLOGY

## 2017-11-16 PROCEDURE — 3017F COLORECTAL CA SCREEN DOC REV: CPT | Performed by: OBSTETRICS & GYNECOLOGY

## 2017-11-16 PROCEDURE — 4040F PNEUMOC VAC/ADMIN/RCVD: CPT | Performed by: OBSTETRICS & GYNECOLOGY

## 2017-11-16 PROCEDURE — G8417 CALC BMI ABV UP PARAM F/U: HCPCS | Performed by: OBSTETRICS & GYNECOLOGY

## 2017-11-16 PROCEDURE — G8400 PT W/DXA NO RESULTS DOC: HCPCS | Performed by: OBSTETRICS & GYNECOLOGY

## 2017-11-16 PROCEDURE — 99203 OFFICE O/P NEW LOW 30 MIN: CPT | Performed by: OBSTETRICS & GYNECOLOGY

## 2017-11-16 PROCEDURE — 88175 CYTOPATH C/V AUTO FLUID REDO: CPT

## 2017-11-16 PROCEDURE — G8427 DOCREV CUR MEDS BY ELIG CLIN: HCPCS | Performed by: OBSTETRICS & GYNECOLOGY

## 2017-11-16 PROCEDURE — 3014F SCREEN MAMMO DOC REV: CPT | Performed by: OBSTETRICS & GYNECOLOGY

## 2017-11-16 NOTE — PROGRESS NOTES
Subjective:      Patient ID: Zay Blanco is a 75 y.o. female. HPI  75 yr female states she has noted some pelvic prolapse over past 5-10 yr which has progressed some. States that it is just inside her vagina unless she coughs which then will protrude thru the vaginal opening. Pt does suffer from chronic bronchitis so does cough frequently. Menopausal since mid 46s and denies any vaginal bleeding since. Not on HRT. No PAP for several years although did have a recent mammogram (sister had breast cancer).  but not sexually active for last 15 yr due to vaginal discomfort with coitus. Suffers from IC and IBS and has frequent diarrhea. Can have fecal incontinence when stools are liquid. Denies SUN. Only has urine leakage when allows bladder to get too full and gets urgency. Review of Systems    Objective:   Physical Exam   Abdomen benign without mass or tenderness. Normal perinuem. Vagina has atrophic mucosa changes. Cervix is within 1-2 cm of introitus in lithotomy position. No lesion seen. S/C junction not visible. Uterus is normal size, mid position. No adnexal mass or tenderness. Assessment:      2nd - 3rd  Degree uterine prolapse. Plan:      PAP. Discussed expectant mngt with decreased lifting/straining. Discussed pessary vs surgical mngt if symptoms progress.

## 2017-11-26 LAB — CYTOLOGY REPORT: NORMAL

## 2017-12-01 ENCOUNTER — OFFICE VISIT (OUTPATIENT)
Dept: FAMILY MEDICINE CLINIC | Age: 67
End: 2017-12-01
Payer: MEDICARE

## 2017-12-01 VITALS
BODY MASS INDEX: 32.67 KG/M2 | HEIGHT: 68 IN | OXYGEN SATURATION: 96 % | WEIGHT: 215.6 LBS | TEMPERATURE: 97.9 F | HEART RATE: 69 BPM | DIASTOLIC BLOOD PRESSURE: 78 MMHG | SYSTOLIC BLOOD PRESSURE: 118 MMHG

## 2017-12-01 DIAGNOSIS — F33.41 RECURRENT MAJOR DEPRESSIVE DISORDER, IN PARTIAL REMISSION (HCC): Primary | ICD-10-CM

## 2017-12-01 PROCEDURE — 4040F PNEUMOC VAC/ADMIN/RCVD: CPT | Performed by: FAMILY MEDICINE

## 2017-12-01 PROCEDURE — 1036F TOBACCO NON-USER: CPT | Performed by: FAMILY MEDICINE

## 2017-12-01 PROCEDURE — 1090F PRES/ABSN URINE INCON ASSESS: CPT | Performed by: FAMILY MEDICINE

## 2017-12-01 PROCEDURE — G8400 PT W/DXA NO RESULTS DOC: HCPCS | Performed by: FAMILY MEDICINE

## 2017-12-01 PROCEDURE — G8427 DOCREV CUR MEDS BY ELIG CLIN: HCPCS | Performed by: FAMILY MEDICINE

## 2017-12-01 PROCEDURE — 99213 OFFICE O/P EST LOW 20 MIN: CPT | Performed by: FAMILY MEDICINE

## 2017-12-01 PROCEDURE — 3014F SCREEN MAMMO DOC REV: CPT | Performed by: FAMILY MEDICINE

## 2017-12-01 PROCEDURE — G8417 CALC BMI ABV UP PARAM F/U: HCPCS | Performed by: FAMILY MEDICINE

## 2017-12-01 PROCEDURE — 3017F COLORECTAL CA SCREEN DOC REV: CPT | Performed by: FAMILY MEDICINE

## 2017-12-01 PROCEDURE — 1123F ACP DISCUSS/DSCN MKR DOCD: CPT | Performed by: FAMILY MEDICINE

## 2017-12-01 PROCEDURE — G8484 FLU IMMUNIZE NO ADMIN: HCPCS | Performed by: FAMILY MEDICINE

## 2017-12-01 RX ORDER — PANTOPRAZOLE SODIUM 40 MG/1
40 TABLET, DELAYED RELEASE ORAL DAILY
Qty: 90 TABLET | Refills: 3 | Status: SHIPPED | OUTPATIENT
Start: 2017-12-01 | End: 2018-11-01 | Stop reason: SDUPTHER

## 2017-12-01 RX ORDER — BUPROPION HYDROCHLORIDE 150 MG/1
150 TABLET ORAL EVERY MORNING
Qty: 90 TABLET | Refills: 3 | Status: SHIPPED | OUTPATIENT
Start: 2017-12-01 | End: 2018-11-01 | Stop reason: SDUPTHER

## 2017-12-01 RX ORDER — HYDROXYZINE HYDROCHLORIDE 25 MG/1
25 TABLET, FILM COATED ORAL NIGHTLY PRN
Qty: 90 TABLET | Refills: 3 | Status: SHIPPED | OUTPATIENT
Start: 2017-12-01 | End: 2018-12-11 | Stop reason: SDUPTHER

## 2017-12-01 RX ORDER — FLUOXETINE HYDROCHLORIDE 20 MG/1
20 CAPSULE ORAL DAILY
Qty: 90 CAPSULE | Refills: 3 | Status: SHIPPED | OUTPATIENT
Start: 2017-12-01 | End: 2018-11-01 | Stop reason: SDUPTHER

## 2017-12-01 RX ORDER — MONTELUKAST SODIUM 10 MG/1
TABLET ORAL
Qty: 90 TABLET | Refills: 3 | Status: SHIPPED | OUTPATIENT
Start: 2017-12-01 | End: 2018-11-01 | Stop reason: SDUPTHER

## 2017-12-01 RX ORDER — AMOXICILLIN 500 MG/1
CAPSULE ORAL
Refills: 0 | COMMUNITY
Start: 2017-11-28 | End: 2018-03-25 | Stop reason: ALTCHOICE

## 2017-12-01 ASSESSMENT — ENCOUNTER SYMPTOMS
CONSTIPATION: 0
ABDOMINAL PAIN: 0
WHEEZING: 0
NAUSEA: 0
DIARRHEA: 0
CHEST TIGHTNESS: 0
SHORTNESS OF BREATH: 0
COUGH: 0

## 2017-12-01 NOTE — PROGRESS NOTES
FLUoxetine (PROZAC) 20 MG capsule Take 1 capsule by mouth daily 90 capsule 3    hydrOXYzine (ATARAX) 25 MG tablet Take 1 tablet by mouth nightly as needed (intersitial cystitis) 90 tablet 3    Lactobacillus (ACIDOPHILUS/BIFIDUS PO) Take 2 capsules by mouth      Garlic 8108 MG CAPS Take by mouth      fexofenadine (ALLEGRA ALLERGY) 180 MG tablet Take 180 mg by mouth daily      aspirin-acetaminophen-caffeine (EXCEDRIN MIGRAINE) 250-250-65 MG per tablet Take 1 tablet by mouth as needed for Headaches      Multiple Vitamins-Minerals (PRESERVISION AREDS 2 PO) Take 1 tablet by mouth daily      fluticasone (FLONASE) 50 MCG/ACT nasal spray by Nasal route      ibuprofen (ADVIL;MOTRIN) 800 MG tablet Take 1 tablet by mouth every 8 hours as needed for Pain 30 tablet 0    amoxicillin (AMOXIL) 500 MG capsule take 4 capsules by mouth 1 hour prior to appointment  0    NONFORMULARY Take 2 capsules by mouth 3 times daily (with meals)      Ginkgo Biloba 40 MG TABS Take by mouth      Ascorbic Acid (VITAMIN C) 500 MG tablet Take 500 mg by mouth daily      magnesium (MAGNESIUM-OXIDE) 250 MG TABS tablet Take 250 mg by mouth daily      MILK THISTLE PO Take by mouth daily      vitamin E 1000 UNITS capsule Take 1,000 Units by mouth every other day       albuterol sulfate  (90 BASE) MCG/ACT inhaler Inhale 2 puffs into the lungs as needed for Wheezing      Multiple Vitamins-Minerals (HAIR/SKIN/NAILS) TABS Take by mouth      Acetaminophen (TYLENOL PO) Take by mouth       No current facility-administered medications for this visit. Allergies   Allergen Reactions    Codeine Other (See Comments)     Gives her a headache    Celebrex [Celecoxib] Rash     Whole body       Subjective:      Review of Systems   Constitutional: Negative for activity change, appetite change, fatigue and fever. Eyes: Negative for visual disturbance. Respiratory: Negative for cough, chest tightness, shortness of breath and wheezing. Cardiovascular: Negative for chest pain, palpitations and leg swelling. Gastrointestinal: Negative for abdominal pain, constipation, diarrhea and nausea. Skin: Negative for rash. Neurological: Negative for dizziness, syncope and headaches. Psychiatric/Behavioral: Positive for sleep disturbance (occasionally due to pain). Negative for decreased concentration, dysphoric mood (resolved), hallucinations, self-injury and suicidal ideas. The patient is not nervous/anxious. Objective:     Physical Exam   Constitutional: She is oriented to person, place, and time. She appears well-developed and well-nourished. No distress. Eyes: Conjunctivae and EOM are normal. Pupils are equal, round, and reactive to light. Neck: Normal range of motion. Neck supple. No thyromegaly present. Cardiovascular: Normal rate, regular rhythm, normal heart sounds and intact distal pulses. No murmur heard. Pulmonary/Chest: Effort normal and breath sounds normal. No respiratory distress. She has no wheezes. Musculoskeletal: She exhibits no edema. Lymphadenopathy:     She has no cervical adenopathy. Neurological: She is alert and oriented to person, place, and time. Skin: Skin is warm and dry. No rash noted. No erythema. No pallor. Psychiatric: She has a normal mood and affect. Her speech is normal and behavior is normal. Judgment and thought content normal. Cognition and memory are normal.   She is much happier. She is laughing and joking throughout the exam   Nursing note and vitals reviewed. /78   Pulse 69   Temp 97.9 °F (36.6 °C) (Tympanic)   Ht 5' 8\" (1.727 m)   Wt 215 lb 9.6 oz (97.8 kg)   SpO2 96%   BMI 32.78 kg/m²      Wt Readings from Last 3 Encounters:   12/01/17 215 lb 9.6 oz (97.8 kg)   11/16/17 210 lb (95.3 kg)   11/09/17 210 lb 6.4 oz (95.4 kg)         Assessment:      1.  Recurrent major depressive disorder, in partial remission ([facility]               Plan:       Depression: stable; she is

## 2018-03-25 ENCOUNTER — OFFICE VISIT (OUTPATIENT)
Dept: PRIMARY CARE CLINIC | Age: 68
End: 2018-03-25
Payer: MEDICARE

## 2018-03-25 VITALS
HEART RATE: 74 BPM | DIASTOLIC BLOOD PRESSURE: 74 MMHG | OXYGEN SATURATION: 97 % | TEMPERATURE: 99 F | BODY MASS INDEX: 32.86 KG/M2 | WEIGHT: 216.8 LBS | SYSTOLIC BLOOD PRESSURE: 136 MMHG | HEIGHT: 68 IN

## 2018-03-25 DIAGNOSIS — J01.40 ACUTE NON-RECURRENT PANSINUSITIS: Primary | ICD-10-CM

## 2018-03-25 PROCEDURE — 1036F TOBACCO NON-USER: CPT | Performed by: NURSE PRACTITIONER

## 2018-03-25 PROCEDURE — 99213 OFFICE O/P EST LOW 20 MIN: CPT | Performed by: NURSE PRACTITIONER

## 2018-03-25 PROCEDURE — 3014F SCREEN MAMMO DOC REV: CPT | Performed by: NURSE PRACTITIONER

## 2018-03-25 PROCEDURE — 4040F PNEUMOC VAC/ADMIN/RCVD: CPT | Performed by: NURSE PRACTITIONER

## 2018-03-25 PROCEDURE — 1123F ACP DISCUSS/DSCN MKR DOCD: CPT | Performed by: NURSE PRACTITIONER

## 2018-03-25 PROCEDURE — 1090F PRES/ABSN URINE INCON ASSESS: CPT | Performed by: NURSE PRACTITIONER

## 2018-03-25 PROCEDURE — G8400 PT W/DXA NO RESULTS DOC: HCPCS | Performed by: NURSE PRACTITIONER

## 2018-03-25 PROCEDURE — 3017F COLORECTAL CA SCREEN DOC REV: CPT | Performed by: NURSE PRACTITIONER

## 2018-03-25 PROCEDURE — G8427 DOCREV CUR MEDS BY ELIG CLIN: HCPCS | Performed by: NURSE PRACTITIONER

## 2018-03-25 PROCEDURE — G8484 FLU IMMUNIZE NO ADMIN: HCPCS | Performed by: NURSE PRACTITIONER

## 2018-03-25 PROCEDURE — G8417 CALC BMI ABV UP PARAM F/U: HCPCS | Performed by: NURSE PRACTITIONER

## 2018-03-25 RX ORDER — AMOXICILLIN 500 MG/1
500 CAPSULE ORAL 3 TIMES DAILY
Qty: 30 CAPSULE | Refills: 0 | Status: SHIPPED | OUTPATIENT
Start: 2018-03-25 | End: 2018-04-04

## 2018-03-25 ASSESSMENT — ENCOUNTER SYMPTOMS
ORTHOPNEA: 0
SINUS PAIN: 1
DOUBLE VISION: 0
STRIDOR: 0
EYE DISCHARGE: 0
SHORTNESS OF BREATH: 0
ABDOMINAL PAIN: 0
NAUSEA: 0
SPUTUM PRODUCTION: 1
BLOOD IN STOOL: 0
SORE THROAT: 0
CONSTIPATION: 0
COUGH: 1
DIARRHEA: 0
EYE REDNESS: 0
WHEEZING: 0
EYE PAIN: 0
VOMITING: 0
PHOTOPHOBIA: 0

## 2018-03-25 NOTE — PROGRESS NOTES
03/25/18  Khushbu Phipps  1950      Chief Complaint: sinus drainage, cough, L ear pressure    HPI:  Pt in for an urgent care visit with complaints of sinus drainage, cough, L ear pressure with hoarse voice worsening over last 2 weeks- thick green nasal drainage and productive cough. No fevers occ chills. No excessive fatigue or excessive body aches. No current treatment. Symptoms moderate in nature.      Allergies   Allergen Reactions    Codeine Other (See Comments)     Gives her a headache    Celebrex [Celecoxib] Rash     Whole body       Past Medical History:   Diagnosis Date    Allergic rhinitis     Anxiety     Asthma     Bronchitis     Chest pain     Chronic back pain     Depression     Fibromyalgia     GERD (gastroesophageal reflux disease)     Headache(784.0)     Heart palpitations     Irregular bowel habits     Macular degeneration     beginning    Measles     Osteoarthritis     Restless legs syndrome     Tachycardia     Unspecified sleep apnea        Past Surgical History:   Procedure Laterality Date    COLONOSCOPY      EYE SURGERY      cataracts removed right eye    KNEE ARTHROSCOPY      left    KNEE SURGERY      total replacement-right    KNEE SURGERY Left 11/2014    SIGMOIDOSCOPY      SINUS SURGERY      TONSILLECTOMY      TUBAL LIGATION         Current Outpatient Prescriptions on File Prior to Visit   Medication Sig Dispense Refill    pantoprazole (PROTONIX) 40 MG tablet Take 1 tablet by mouth daily 90 tablet 3    montelukast (SINGULAIR) 10 MG tablet take 1 tablet by mouth every evening 90 tablet 3    buPROPion (WELLBUTRIN XL) 150 MG extended release tablet Take 1 tablet by mouth every morning 90 tablet 3    FLUoxetine (PROZAC) 20 MG capsule Take 1 capsule by mouth daily 90 capsule 3    hydrOXYzine (ATARAX) 25 MG tablet Take 1 tablet by mouth nightly as needed (intersitial cystitis) 90 tablet 3    NONFORMULARY Take 2 capsules by mouth 3 times daily (with meals)

## 2018-05-16 ENCOUNTER — TELEPHONE (OUTPATIENT)
Dept: FAMILY MEDICINE CLINIC | Age: 68
End: 2018-05-16

## 2018-05-29 ENCOUNTER — OFFICE VISIT (OUTPATIENT)
Dept: FAMILY MEDICINE CLINIC | Age: 68
End: 2018-05-29
Payer: MEDICARE

## 2018-05-29 VITALS
SYSTOLIC BLOOD PRESSURE: 110 MMHG | HEART RATE: 72 BPM | TEMPERATURE: 98.3 F | DIASTOLIC BLOOD PRESSURE: 78 MMHG | BODY MASS INDEX: 31.95 KG/M2 | WEIGHT: 210.8 LBS | HEIGHT: 68 IN | OXYGEN SATURATION: 94 %

## 2018-05-29 DIAGNOSIS — Z11.59 ENCOUNTER FOR HEPATITIS C SCREENING TEST FOR LOW RISK PATIENT: ICD-10-CM

## 2018-05-29 DIAGNOSIS — M89.9 BONE DISORDER: ICD-10-CM

## 2018-05-29 DIAGNOSIS — Z13.6 SCREENING FOR ISCHEMIC HEART DISEASE: ICD-10-CM

## 2018-05-29 DIAGNOSIS — K21.9 GASTROESOPHAGEAL REFLUX DISEASE, ESOPHAGITIS PRESENCE NOT SPECIFIED: ICD-10-CM

## 2018-05-29 DIAGNOSIS — F33.41 RECURRENT MAJOR DEPRESSIVE DISORDER, IN PARTIAL REMISSION (HCC): Primary | ICD-10-CM

## 2018-05-29 PROCEDURE — 4040F PNEUMOC VAC/ADMIN/RCVD: CPT | Performed by: FAMILY MEDICINE

## 2018-05-29 PROCEDURE — 1123F ACP DISCUSS/DSCN MKR DOCD: CPT | Performed by: FAMILY MEDICINE

## 2018-05-29 PROCEDURE — 99214 OFFICE O/P EST MOD 30 MIN: CPT | Performed by: FAMILY MEDICINE

## 2018-05-29 PROCEDURE — 1090F PRES/ABSN URINE INCON ASSESS: CPT | Performed by: FAMILY MEDICINE

## 2018-05-29 PROCEDURE — G8427 DOCREV CUR MEDS BY ELIG CLIN: HCPCS | Performed by: FAMILY MEDICINE

## 2018-05-29 PROCEDURE — G8400 PT W/DXA NO RESULTS DOC: HCPCS | Performed by: FAMILY MEDICINE

## 2018-05-29 PROCEDURE — 1036F TOBACCO NON-USER: CPT | Performed by: FAMILY MEDICINE

## 2018-05-29 PROCEDURE — 3017F COLORECTAL CA SCREEN DOC REV: CPT | Performed by: FAMILY MEDICINE

## 2018-05-29 PROCEDURE — G8417 CALC BMI ABV UP PARAM F/U: HCPCS | Performed by: FAMILY MEDICINE

## 2018-05-29 ASSESSMENT — PATIENT HEALTH QUESTIONNAIRE - PHQ9
SUM OF ALL RESPONSES TO PHQ9 QUESTIONS 1 & 2: 0
1. LITTLE INTEREST OR PLEASURE IN DOING THINGS: 0
SUM OF ALL RESPONSES TO PHQ QUESTIONS 1-9: 0
2. FEELING DOWN, DEPRESSED OR HOPELESS: 0

## 2018-05-29 ASSESSMENT — ENCOUNTER SYMPTOMS
SHORTNESS OF BREATH: 0
COUGH: 0
WHEEZING: 0
CHEST TIGHTNESS: 0

## 2018-05-31 ENCOUNTER — HOSPITAL ENCOUNTER (OUTPATIENT)
Dept: LAB | Age: 68
Setting detail: SPECIMEN
Discharge: HOME | End: 2018-05-31
Payer: MEDICARE

## 2018-05-31 ENCOUNTER — HOSPITAL ENCOUNTER (OUTPATIENT)
Dept: BONE DENSITY | Age: 68
Discharge: HOME | End: 2018-06-02
Payer: MEDICARE

## 2018-05-31 DIAGNOSIS — M89.9 BONE DISORDER: ICD-10-CM

## 2018-05-31 DIAGNOSIS — K21.9 GASTROESOPHAGEAL REFLUX DISEASE, ESOPHAGITIS PRESENCE NOT SPECIFIED: ICD-10-CM

## 2018-05-31 DIAGNOSIS — Z13.6 SCREENING FOR ISCHEMIC HEART DISEASE: ICD-10-CM

## 2018-05-31 DIAGNOSIS — Z11.59 ENCOUNTER FOR HEPATITIS C SCREENING TEST FOR LOW RISK PATIENT: ICD-10-CM

## 2018-05-31 LAB
ALBUMIN SERPL-MCNC: 4.4 G/DL (ref 3.5–5.2)
ALBUMIN/GLOBULIN RATIO: 1.6 (ref 1–2.5)
ALP BLD-CCNC: 72 U/L (ref 35–104)
ALT SERPL-CCNC: 20 U/L (ref 5–33)
ANION GAP SERPL CALCULATED.3IONS-SCNC: 14 MMOL/L (ref 9–17)
AST SERPL-CCNC: 21 U/L
BILIRUB SERPL-MCNC: 0.46 MG/DL (ref 0.3–1.2)
BUN BLDV-MCNC: 18 MG/DL (ref 8–23)
BUN/CREAT BLD: 26 (ref 9–20)
CALCIUM SERPL-MCNC: 9.5 MG/DL (ref 8.6–10.4)
CHLORIDE BLD-SCNC: 101 MMOL/L (ref 98–107)
CHOLESTEROL/HDL RATIO: 5.1
CHOLESTEROL: 202 MG/DL
CO2: 28 MMOL/L (ref 20–31)
CREAT SERPL-MCNC: 0.69 MG/DL (ref 0.5–0.9)
GFR AFRICAN AMERICAN: >60 ML/MIN
GFR NON-AFRICAN AMERICAN: >60 ML/MIN
GFR SERPL CREATININE-BSD FRML MDRD: ABNORMAL ML/MIN/{1.73_M2}
GFR SERPL CREATININE-BSD FRML MDRD: ABNORMAL ML/MIN/{1.73_M2}
GLUCOSE BLD-MCNC: 105 MG/DL (ref 70–99)
HDLC SERPL-MCNC: 40 MG/DL
HEPATITIS C ANTIBODY: NONREACTIVE
LDL CHOLESTEROL: 127 MG/DL (ref 0–130)
POTASSIUM SERPL-SCNC: 4.1 MMOL/L (ref 3.7–5.3)
SODIUM BLD-SCNC: 143 MMOL/L (ref 135–144)
TOTAL PROTEIN: 7.1 G/DL (ref 6.4–8.3)
TRIGL SERPL-MCNC: 174 MG/DL
VLDLC SERPL CALC-MCNC: ABNORMAL MG/DL (ref 1–30)

## 2018-05-31 PROCEDURE — 80053 COMPREHEN METABOLIC PANEL: CPT

## 2018-05-31 PROCEDURE — 77080 DXA BONE DENSITY AXIAL: CPT

## 2018-05-31 PROCEDURE — 80061 LIPID PANEL: CPT

## 2018-05-31 PROCEDURE — 86803 HEPATITIS C AB TEST: CPT

## 2018-05-31 PROCEDURE — 36415 COLL VENOUS BLD VENIPUNCTURE: CPT

## 2018-11-01 ENCOUNTER — OFFICE VISIT (OUTPATIENT)
Dept: FAMILY MEDICINE CLINIC | Age: 68
End: 2018-11-01
Payer: MEDICARE

## 2018-11-01 VITALS
HEART RATE: 66 BPM | HEIGHT: 68 IN | TEMPERATURE: 98.4 F | DIASTOLIC BLOOD PRESSURE: 80 MMHG | BODY MASS INDEX: 33.07 KG/M2 | OXYGEN SATURATION: 93 % | WEIGHT: 218.2 LBS | SYSTOLIC BLOOD PRESSURE: 110 MMHG

## 2018-11-01 DIAGNOSIS — F33.1 MODERATE EPISODE OF RECURRENT MAJOR DEPRESSIVE DISORDER (HCC): ICD-10-CM

## 2018-11-01 DIAGNOSIS — Z00.00 ROUTINE GENERAL MEDICAL EXAMINATION AT A HEALTH CARE FACILITY: Primary | ICD-10-CM

## 2018-11-01 DIAGNOSIS — L20.84 INTRINSIC ECZEMA: ICD-10-CM

## 2018-11-01 DIAGNOSIS — Z12.39 SCREENING FOR BREAST CANCER: ICD-10-CM

## 2018-11-01 DIAGNOSIS — M62.838 TRAPEZIUS MUSCLE SPASM: ICD-10-CM

## 2018-11-01 PROCEDURE — G8417 CALC BMI ABV UP PARAM F/U: HCPCS | Performed by: FAMILY MEDICINE

## 2018-11-01 PROCEDURE — 1036F TOBACCO NON-USER: CPT | Performed by: FAMILY MEDICINE

## 2018-11-01 PROCEDURE — 4040F PNEUMOC VAC/ADMIN/RCVD: CPT | Performed by: FAMILY MEDICINE

## 2018-11-01 PROCEDURE — 1090F PRES/ABSN URINE INCON ASSESS: CPT | Performed by: FAMILY MEDICINE

## 2018-11-01 PROCEDURE — G8427 DOCREV CUR MEDS BY ELIG CLIN: HCPCS | Performed by: FAMILY MEDICINE

## 2018-11-01 PROCEDURE — G8399 PT W/DXA RESULTS DOCUMENT: HCPCS | Performed by: FAMILY MEDICINE

## 2018-11-01 PROCEDURE — 3017F COLORECTAL CA SCREEN DOC REV: CPT | Performed by: FAMILY MEDICINE

## 2018-11-01 PROCEDURE — 99214 OFFICE O/P EST MOD 30 MIN: CPT | Performed by: FAMILY MEDICINE

## 2018-11-01 PROCEDURE — 1101F PT FALLS ASSESS-DOCD LE1/YR: CPT | Performed by: FAMILY MEDICINE

## 2018-11-01 PROCEDURE — 1123F ACP DISCUSS/DSCN MKR DOCD: CPT | Performed by: FAMILY MEDICINE

## 2018-11-01 PROCEDURE — 20552 NJX 1/MLT TRIGGER POINT 1/2: CPT | Performed by: FAMILY MEDICINE

## 2018-11-01 PROCEDURE — G8484 FLU IMMUNIZE NO ADMIN: HCPCS | Performed by: FAMILY MEDICINE

## 2018-11-01 PROCEDURE — G0438 PPPS, INITIAL VISIT: HCPCS | Performed by: FAMILY MEDICINE

## 2018-11-01 RX ORDER — PANTOPRAZOLE SODIUM 40 MG/1
40 TABLET, DELAYED RELEASE ORAL DAILY
Qty: 90 TABLET | Refills: 3 | Status: ON HOLD | OUTPATIENT
Start: 2018-11-01 | End: 2019-03-12 | Stop reason: HOSPADM

## 2018-11-01 RX ORDER — HYDROXYZINE HYDROCHLORIDE 25 MG/1
25 TABLET, FILM COATED ORAL NIGHTLY PRN
Qty: 90 TABLET | Refills: 3 | Status: CANCELLED | OUTPATIENT
Start: 2018-11-01

## 2018-11-01 RX ORDER — TRIAMCINOLONE ACETONIDE 40 MG/ML
20 INJECTION, SUSPENSION INTRA-ARTICULAR; INTRAMUSCULAR ONCE
Status: COMPLETED | OUTPATIENT
Start: 2018-11-01 | End: 2018-11-01

## 2018-11-01 RX ORDER — FLUOXETINE HYDROCHLORIDE 40 MG/1
40 CAPSULE ORAL DAILY
Qty: 90 CAPSULE | Refills: 2 | Status: SHIPPED | OUTPATIENT
Start: 2018-11-01 | End: 2019-05-14 | Stop reason: SDUPTHER

## 2018-11-01 RX ORDER — TRIAMCINOLONE ACETONIDE 0.25 MG/G
CREAM TOPICAL
Qty: 80 G | Refills: 0 | Status: SHIPPED | OUTPATIENT
Start: 2018-11-01 | End: 2020-09-15 | Stop reason: SDUPTHER

## 2018-11-01 RX ORDER — MONTELUKAST SODIUM 10 MG/1
TABLET ORAL
Qty: 90 TABLET | Refills: 3 | Status: SHIPPED | OUTPATIENT
Start: 2018-11-01 | End: 2019-11-12 | Stop reason: SDUPTHER

## 2018-11-01 RX ORDER — BUPROPION HYDROCHLORIDE 150 MG/1
150 TABLET ORAL EVERY MORNING
Qty: 90 TABLET | Refills: 3 | Status: SHIPPED | OUTPATIENT
Start: 2018-11-01 | End: 2019-11-03 | Stop reason: SDUPTHER

## 2018-11-01 RX ORDER — ACETYLCARNITINE HCL 100 %
1 POWDER (GRAM) MISCELLANEOUS
Status: ON HOLD | COMMUNITY
End: 2019-03-12 | Stop reason: HOSPADM

## 2018-11-01 RX ADMIN — TRIAMCINOLONE ACETONIDE 20 MG: 40 INJECTION, SUSPENSION INTRA-ARTICULAR; INTRAMUSCULAR at 16:04

## 2018-11-01 RX ADMIN — TRIAMCINOLONE ACETONIDE 20 MG: 40 INJECTION, SUSPENSION INTRA-ARTICULAR; INTRAMUSCULAR at 16:03

## 2018-11-01 ASSESSMENT — ENCOUNTER SYMPTOMS
WHEEZING: 0
PHOTOPHOBIA: 1
CONSTIPATION: 0
DIARRHEA: 1
COUGH: 0
ABDOMINAL PAIN: 0
CHEST TIGHTNESS: 0
SHORTNESS OF BREATH: 0

## 2018-11-01 ASSESSMENT — LIFESTYLE VARIABLES: HOW OFTEN DO YOU HAVE A DRINK CONTAINING ALCOHOL: 0

## 2018-11-01 ASSESSMENT — ANXIETY QUESTIONNAIRES: GAD7 TOTAL SCORE: 6

## 2018-11-01 ASSESSMENT — PATIENT HEALTH QUESTIONNAIRE - PHQ9: SUM OF ALL RESPONSES TO PHQ QUESTIONS 1-9: 19

## 2018-11-01 NOTE — PATIENT INSTRUCTIONS
Personalized Preventive Plan for Lynne Rushing - 11/1/2018  Medicare offers a range of preventive health benefits. Some of the tests and screenings are paid in full while other may be subject to a deductible, co-insurance, and/or copay. Some of these benefits include a comprehensive review of your medical history including lifestyle, illnesses that may run in your family, and various assessments and screenings as appropriate. After reviewing your medical record and screening and assessments performed today your provider may have ordered immunizations, labs, imaging, and/or referrals for you. A list of these orders (if applicable) as well as your Preventive Care list are included within your After Visit Summary for your review. Other Preventive Recommendations:    · A preventive eye exam performed by an eye specialist is recommended every 1-2 years to screen for glaucoma; cataracts, macular degeneration, and other eye disorders. · A preventive dental visit is recommended every 6 months. · Try to get at least 150 minutes of exercise per week or 10,000 steps per day on a pedometer . · Order or download the FREE \"Exercise & Physical Activity: Your Everyday Guide\" from [employer]. Call 2-311.216.2324 or search [employer] online. · You need 7973-9507 mg of calcium and 5177-9453 IU of vitamin D per day. It is possible to meet your calcium requirement with diet alone, but a vitamin D supplement is usually necessary to meet this goal.  · When exposed to the sun, use a sunscreen that protects against both UVA and UVB radiation with an SPF of 30 or greater. Reapply every 2 to 3 hours or after sweating, drying off with a towel, or swimming. · Always wear a seat belt when traveling in a car. Always wear a helmet when riding a bicycle or motorcycle.   Patient Education        Rhomboid Muscle Strain: Rehab Exercises  Your Care Instructions  Here are some examples of

## 2018-11-01 NOTE — PROGRESS NOTES
 GERD (gastroesophageal reflux disease)     Headache(784.0)     Heart palpitations     Irregular bowel habits     Macular degeneration     beginning    Measles     Osteoarthritis     Restless legs syndrome     Tachycardia     Unspecified sleep apnea     Wet senile macular degeneration (HCC)     bilateral- Sees a retinal Dr Cait Arriola          Social History   Substance Use Topics    Smoking status: Former Smoker     Types: Cigarettes     Quit date: 11/7/1993    Smokeless tobacco: Never Used    Alcohol use 1.2 oz/week     2 Glasses of wine per week     Current Outpatient Prescriptions   Medication Sig Dispense Refill    pantoprazole (PROTONIX) 40 MG tablet Take 1 tablet by mouth daily 90 tablet 3    montelukast (SINGULAIR) 10 MG tablet take 1 tablet by mouth every evening 90 tablet 3    buPROPion (WELLBUTRIN XL) 150 MG extended release tablet Take 1 tablet by mouth every morning 90 tablet 3    FLUoxetine (PROZAC) 40 MG capsule Take 1 capsule by mouth daily 90 capsule 2    Acetylcarnitine HCl (ACETYL-L-CARNITINE HCL) POWD 1 tablet by Does not apply route      triamcinolone (KENALOG) 0.025 % cream Apply Topically daily as needed for itching 80 g 0    aspirin-acetaminophen-caffeine (EXCEDRIN MIGRAINE) 250-250-65 MG per tablet Take 1 tablet by mouth as needed for Headaches      Multiple Vitamins-Minerals (HAIR/SKIN/NAILS) TABS Take by mouth      Multiple Vitamins-Minerals (PRESERVISION AREDS 2 PO) Take 1 tablet by mouth daily      fluticasone (FLONASE) 50 MCG/ACT nasal spray by Nasal route      Acetaminophen (TYLENOL PO) Take by mouth      ibuprofen (ADVIL;MOTRIN) 800 MG tablet Take 1 tablet by mouth every 8 hours as needed for Pain 30 tablet 0    hydrOXYzine (ATARAX) 25 MG tablet Take 1 tablet by mouth nightly as needed (intersitial cystitis) 90 tablet 3    Ascorbic Acid (VITAMIN C) 500 MG tablet Take 500 mg by mouth daily      fexofenadine (ALLEGRA ALLERGY) 180 MG tablet Take 180 mg by mouth daily       No current facility-administered medications for this visit. Allergies   Allergen Reactions    Codeine Other (See Comments)     Gives her a headache    Celebrex [Celecoxib] Rash     Whole body       Subjective:     Review of Systems   Constitutional: Positive for fatigue. Negative for activity change, appetite change, chills and fever. Eyes: Positive for photophobia. Negative for visual disturbance. Respiratory: Negative for cough, chest tightness, shortness of breath and wheezing. Cardiovascular: Negative for chest pain, palpitations (with anxiety) and leg swelling. Gastrointestinal: Positive for diarrhea. Negative for abdominal pain and constipation. Genitourinary: Negative for difficulty urinating. Musculoskeletal: Positive for neck pain. Neurological: Positive for headaches. Negative for dizziness, tingling, syncope, weakness, light-headedness and numbness. Psychiatric/Behavioral: Negative for sleep disturbance (as long as she takes her sleeping pills). Objective:     Physical Exam   Constitutional: She is oriented to person, place, and time. She appears well-developed and well-nourished. No distress. Eyes: Pupils are equal, round, and reactive to light. Conjunctivae and EOM are normal.   Neck: Normal range of motion. Neck supple. No thyromegaly present. Cardiovascular: Normal rate, regular rhythm, normal heart sounds and intact distal pulses. No murmur heard. Pulmonary/Chest: Effort normal and breath sounds normal. No respiratory distress. She has no wheezes. Musculoskeletal: She exhibits no edema. Cervical back: She exhibits decreased range of motion, tenderness and spasm. Lymphadenopathy:     She has no cervical adenopathy. Neurological: She is alert and oriented to person, place, and time. She has normal strength. No sensory deficit. Reflex Scores:       Bicep reflexes are 2+ on the right side and 2+ on the left side.   Skin: Skin is warm and dry. No rash noted. No erythema. Psychiatric: Her speech is normal and behavior is normal. Judgment and thought content normal. Cognition and memory are normal. She exhibits a depressed mood. Nursing note and vitals reviewed. /80 (Site: Right Upper Arm, Position: Sitting, Cuff Size: Medium Adult)   Pulse 66   Temp 98.4 °F (36.9 °C) (Tympanic)   Ht 5' 8\" (1.727 m)   Wt 218 lb 3.2 oz (99 kg)   SpO2 93%   BMI 33.18 kg/m²     Assessment:       Diagnosis Orders   1. Routine general medical examination at a health care facility     2. Intrinsic eczema     3. Trapezius muscle spasm  [facility] [place]    [place] INJECT TRIGGER POINT, 1 OR 2    triamcinolone acetonide (KENALOG-40) injection 20 mg    triamcinolone acetonide (KENALOG-40) injection 20 mg   4. Screening for breast cancer  [facility] DIGITAL SCREEN W OR WO CAD BILATERAL             Plan:       MWV: see other note    Eczema: worsening; I will try treating with a stronger steroid cream to see if that helps. If it does not I will try eucrissa. Trapezius muscle spasm: worsening; I recommended using heat on the neck for 10 minutes three times a day and then doing her neck exercises after she uses the heat. I then told her to ice the neck afterwards. she was given both neck and shoulder exercises and referred to PT for dry needling. I also offered her trigger point injections which she agreed to. A trigger point injection was performed at the site of maximal tenderness using 1% plain Lidocaine and Kenalog. This was well tolerated, and followed by immediate relief of pain. Depression: worsening; I increased her prozac and I encouraged her to go to counseling. She is going to think about it and call if she would like a referral.    Health Maintenance reviewed - mammogram ordered, patient to schedule appointment. Return in 1 month (on 12/1/2018) for Depression follow up.     Orders Placed This Encounter   Procedures    JONATHAN DIGITAL

## 2018-11-08 ENCOUNTER — HOSPITAL ENCOUNTER (OUTPATIENT)
Dept: PHYSICAL THERAPY | Age: 68
Setting detail: THERAPIES SERIES
Discharge: HOME | End: 2018-11-08
Payer: MEDICARE

## 2018-11-08 PROCEDURE — G8982 BODY POS GOAL STATUS: HCPCS | Performed by: PHYSICAL THERAPIST

## 2018-11-08 PROCEDURE — G8981 BODY POS CURRENT STATUS: HCPCS | Performed by: PHYSICAL THERAPIST

## 2018-11-08 PROCEDURE — 97161 PT EVAL LOW COMPLEX 20 MIN: CPT | Performed by: PHYSICAL THERAPIST

## 2018-11-08 PROCEDURE — 97110 THERAPEUTIC EXERCISES: CPT | Performed by: PHYSICAL THERAPIST

## 2018-11-08 ASSESSMENT — PAIN DESCRIPTION - ONSET: ONSET: ON-GOING

## 2018-11-08 ASSESSMENT — PAIN DESCRIPTION - PROGRESSION: CLINICAL_PROGRESSION: NOT CHANGED

## 2018-11-08 ASSESSMENT — PAIN DESCRIPTION - PAIN TYPE: TYPE: CHRONIC PAIN

## 2018-11-08 ASSESSMENT — PAIN DESCRIPTION - LOCATION: LOCATION: NECK

## 2018-11-08 ASSESSMENT — PAIN DESCRIPTION - FREQUENCY: FREQUENCY: CONTINUOUS

## 2018-11-08 ASSESSMENT — PAIN DESCRIPTION - DESCRIPTORS: DESCRIPTORS: ACHING

## 2018-11-08 ASSESSMENT — PAIN SCALES - GENERAL: PAINLEVEL_OUTOF10: 9

## 2018-11-08 ASSESSMENT — PAIN DESCRIPTION - ORIENTATION: ORIENTATION: RIGHT;LEFT

## 2018-11-08 NOTE — FLOWSHEET NOTE
patient contact (use of dynamic activities to improve functional performance). (38209)    Gait:   [] Provided training and instruction to the patient for ambulation re-education. (22594)    Self-Care/ADL's  [] Self-care/home management training and compensatory training, meal preparation, safety procedures, and instructions in use of assistive technology devices/adaptive equipment, direct one-on-one contact. (85354)    Home Exercise Program:  C-spine retraction with OP   [x] Reviewed/Progressed HEP activities related to strengthening, flexibility, endurance, ROM. (87122)  [] Reviewed/Progressed HEP activities related to improving balance, coordination, kinesthetic sense, posture, motor skill, proprioception.  (42051)    Manual Treatments:    [] Provided manual therapy to mobilize soft tissue/joints for the purpose of modulating pain, promoting relaxation,  increasing ROM, reducing/eliminating soft tissue swelling/inflammation/restriction, improving soft tissue extensibility. (24618)    Service Based Modalities:      Timed Code Treatment Minutes:   There ex/HEP 15'     Total Treatment Minutes:   15'    Treatment/Activity Tolerance:  [x] Patient tolerated treatment well [] Patient limited by fatique  [] Patient limited by pain  [] Patient limited by other medical complications  [x] Other: severe motion loss    Prognosis: [] Good [x] Fair  [] Poor    Patient Requires Follow-up: [x] Yes  [] No      Goals:  Short term goals  Time Frame for Short term goals: 1 week  Short term goal 1: Start HEP    Long term goals  Time Frame for Long term goals : 4 weeks  Long term goal 1: Pain 3/10 to allow comfort when at rest  Long term goal 2: Active rotation 30- 40 deg for expanding visual field  Long term goal 3: Headache frequency reduced by 40%          Plan:   [] Continue per plan of care [] Alter current plan (see comments)  [x] Plan of care initiated [] Hold pending MD visit [] Discharge  Plan for Next Session:  Traction & IDN    Electronically signed by:  Chalo Hines

## 2018-11-08 NOTE — PLAN OF CARE
[facility]    [x] [place]  Phone: [phone]  Fax: [phone]      [] [place]  Phone: [phone]  Fax: [phone]        To: Referring Practitioner: Rachel Mead      Patient: Terri Epperson  : 1950   MRN: [id number]  Evaluation Date: 2018      Diagnosis Information:  · Diagnosis: M62.838 trapezius muscle spasm   · Treatment Diagnosis: C-spine extension & rotation dysfunction     Physical Therapy Certification Form  Dear Clifford Méndez  The following patient has been evaluated for physical therapy services and for therapy to continue, Medicare requires monthly physician review of the treatment plan. Please review the attached evaluation and/or summary of the patient's plan of care, and verify that you agree therapy should continue by signing the attached document and sending it back to our office. Plan of Care/Treatment to date:  [x] Therapeutic Exercise    [] Modalities:  [] Therapeutic Activity     [] Ultrasound  [x] Electrical Stimulation  [] Gait Training      [] Cervical Traction [] Lumbar Traction  [] Neuromuscular Re-education    [] Cold/hotpack [] Iontophoresis   [x] Instruction in HEP     Other:  [x] Manual Therapy      [x]  Dry needling           [] Aquatic Therapy      []           ? Goals:  Short term goals  Time Frame for Short term goals: 1 week  Short term goal 1: Start HEP    Long term goals  Time Frame for Long term goals : 4 weeks  Long term goal 1: Pain 3/10 to allow comfort when at rest  Long term goal 2: Active rotation 30- 40 deg for expanding visual field  Long term goal 3: Headache frequency reduced by 40%    Frequency/Duration:18 - 18  # Days per week: [] 1 day # Weeks: [] 1 week [] 5 weeks     [x] 2 days?    [] 2 weeks [] 6 weeks     [] 3 days   [] 3 weeks [] 7 weeks     [] 4 days   [x] 4 weeks [] 8 weeks    Rehab Potential: [] Excellent [] Good [x] Fair  [] Poor     Electronically signed by:  Shaun Shipley

## 2018-11-15 ENCOUNTER — HOSPITAL ENCOUNTER (OUTPATIENT)
Dept: PHYSICAL THERAPY | Age: 68
Setting detail: THERAPIES SERIES
Discharge: HOME | End: 2018-11-15
Payer: MEDICARE

## 2018-11-15 PROCEDURE — 97012 MECHANICAL TRACTION THERAPY: CPT | Performed by: PHYSICAL THERAPY ASSISTANT

## 2018-11-15 PROCEDURE — 97110 THERAPEUTIC EXERCISES: CPT | Performed by: PHYSICAL THERAPY ASSISTANT

## 2018-11-20 ENCOUNTER — APPOINTMENT (OUTPATIENT)
Dept: PHYSICAL THERAPY | Age: 68
End: 2018-11-20
Payer: MEDICARE

## 2018-12-11 ENCOUNTER — HOSPITAL ENCOUNTER (OUTPATIENT)
Dept: MAMMOGRAPHY | Age: 68
Discharge: HOME | End: 2018-12-13
Payer: MEDICARE

## 2018-12-11 ENCOUNTER — HOSPITAL ENCOUNTER (OUTPATIENT)
Dept: PHYSICAL THERAPY | Age: 68
Setting detail: THERAPIES SERIES
Discharge: HOME | End: 2018-12-11
Payer: MEDICARE

## 2018-12-11 ENCOUNTER — OFFICE VISIT (OUTPATIENT)
Dept: FAMILY MEDICINE CLINIC | Age: 68
End: 2018-12-11
Payer: MEDICARE

## 2018-12-11 VITALS
HEIGHT: 68 IN | BODY MASS INDEX: 33.46 KG/M2 | SYSTOLIC BLOOD PRESSURE: 128 MMHG | HEART RATE: 67 BPM | TEMPERATURE: 98.1 F | DIASTOLIC BLOOD PRESSURE: 78 MMHG | OXYGEN SATURATION: 93 % | WEIGHT: 220.8 LBS

## 2018-12-11 DIAGNOSIS — Z12.39 SCREENING FOR BREAST CANCER: ICD-10-CM

## 2018-12-11 DIAGNOSIS — Z13.31 POSITIVE DEPRESSION SCREENING: ICD-10-CM

## 2018-12-11 DIAGNOSIS — F33.41 RECURRENT MAJOR DEPRESSIVE DISORDER, IN PARTIAL REMISSION (HCC): Primary | ICD-10-CM

## 2018-12-11 PROCEDURE — 99213 OFFICE O/P EST LOW 20 MIN: CPT | Performed by: FAMILY MEDICINE

## 2018-12-11 PROCEDURE — 97012 MECHANICAL TRACTION THERAPY: CPT | Performed by: PHYSICAL THERAPY ASSISTANT

## 2018-12-11 PROCEDURE — 4040F PNEUMOC VAC/ADMIN/RCVD: CPT | Performed by: FAMILY MEDICINE

## 2018-12-11 PROCEDURE — 1036F TOBACCO NON-USER: CPT | Performed by: FAMILY MEDICINE

## 2018-12-11 PROCEDURE — G8484 FLU IMMUNIZE NO ADMIN: HCPCS | Performed by: FAMILY MEDICINE

## 2018-12-11 PROCEDURE — 77063 BREAST TOMOSYNTHESIS BI: CPT

## 2018-12-11 PROCEDURE — 3017F COLORECTAL CA SCREEN DOC REV: CPT | Performed by: FAMILY MEDICINE

## 2018-12-11 PROCEDURE — 1101F PT FALLS ASSESS-DOCD LE1/YR: CPT | Performed by: FAMILY MEDICINE

## 2018-12-11 PROCEDURE — G8399 PT W/DXA RESULTS DOCUMENT: HCPCS | Performed by: FAMILY MEDICINE

## 2018-12-11 PROCEDURE — 1123F ACP DISCUSS/DSCN MKR DOCD: CPT | Performed by: FAMILY MEDICINE

## 2018-12-11 PROCEDURE — 97110 THERAPEUTIC EXERCISES: CPT | Performed by: PHYSICAL THERAPY ASSISTANT

## 2018-12-11 PROCEDURE — G8431 POS CLIN DEPRES SCRN F/U DOC: HCPCS | Performed by: FAMILY MEDICINE

## 2018-12-11 PROCEDURE — 1090F PRES/ABSN URINE INCON ASSESS: CPT | Performed by: FAMILY MEDICINE

## 2018-12-11 PROCEDURE — G8427 DOCREV CUR MEDS BY ELIG CLIN: HCPCS | Performed by: FAMILY MEDICINE

## 2018-12-11 PROCEDURE — G8417 CALC BMI ABV UP PARAM F/U: HCPCS | Performed by: FAMILY MEDICINE

## 2018-12-11 RX ORDER — HYDROXYZINE HYDROCHLORIDE 25 MG/1
25 TABLET, FILM COATED ORAL NIGHTLY PRN
Qty: 90 TABLET | Refills: 3 | Status: SHIPPED | OUTPATIENT
Start: 2018-12-11 | End: 2019-11-12 | Stop reason: SDUPTHER

## 2018-12-11 ASSESSMENT — ENCOUNTER SYMPTOMS
SHORTNESS OF BREATH: 0
COUGH: 0
CHEST TIGHTNESS: 0
WHEEZING: 0

## 2018-12-11 NOTE — FLOWSHEET NOTE
Physical Therapy Daily Treatment Note    Date:  2018    Patient Name:  Catie Baptiste   \"BERNICE\" :  1950  MRN: [id number]     Restrictions/Precautions:  Macular degeneration     Medical/Treatment Diagnosis Information:   · Diagnosis: M62.838 trapezius muscle spasm  · Treatment Diagnosis: C-spine extension & rotation dysfunction    Insurance/Certification information:  PT Insurance Information: Medicare    Physician Information:  Referring Practitioner: Megan Lopez of care signed (Y/N):  Y    Visit# / total visits: 3/8    Pain level: 0/10     G-Code (if applicable):      Date G-Code Applied:  18  PT G-Codes  Functional Assessment Tool Used: Neck Pain Disability Index- modified  Score:  = 45%  Functional Limitation: Changing and maintaining body position  Changing and Maintaining Body Position Current Status (): At least 40 percent but less than 60 percent impaired, limited or restricted  Changing and Maintaining Body Position Goal Status (): At least 20 percent but less than 40 percent impaired, limited or restricted    Time In:133   Time Out:220    Progress Note: []  Yes  [x]  No  Next due by: Visit #8, or 18      Subjective:  Overall improvement in neck pain and headaches. Pain in cervical region rated 0/10 at present but increases to 4/10 depending on activity. Compliance with current HEP. Objective:  PRABHAKAR complete per flow chart to facilitate cervical motion, mobility and decrease tightness and pain to allow ease with daily activities. Verbal cuing for progression. Mechanical traction 15#-5# to decrease pain and tightness.    Observation: Limited chin tuck, extension remains    Test measurements:      Exercises:   Exercise/Equipment Resistance/Repetitions Other comments   Retraction 10x x2 2 set patient OP   Retraction/extension 10x2    Active rotation 10x    Active side bend 10x         Upper cervical flexion mob  Supine with towel roll                        MFR-

## 2018-12-11 NOTE — PROGRESS NOTES
[address] [facility]  Dept: [phone]  Dept Fax: [phone]  Loc: [phone]    Germania Hagan is a 70 y.o. female who presents today for her medical conditions/complaints as noted below. Germania Hagan is c/o of   Chief Complaint   Patient presents with    Depression     1 month -medicine is working       HPI:     HPI Here today for a follow up of her depression. Depression: improving; she is doing well on her increased dose of prozac. She did not have any side effects with it. She is eating well. She plans to start exercising at home in January. She did not end up going to counseling. She is sleeping okay at night as long as she takes tylenol PM. Her sister is doing very well (she has uterine cancer). She does not have to do chemo or radiation.        Past Medical History:   Diagnosis Date    Allergic rhinitis     Anxiety     Asthma     Bronchitis     Chest pain     Chronic back pain     Depression     Fibromyalgia     GERD (gastroesophageal reflux disease)     Headache(784.0)     Heart palpitations     Irregular bowel habits     Macular degeneration     beginning    Measles     Osteoarthritis     Restless legs syndrome     Tachycardia     Unspecified sleep apnea     Wet senile macular degeneration (HCC)     bilateral- Sees a retinal Dr Jase Zabala          Social History   Substance Use Topics    Smoking status: Former Smoker     Types: Cigarettes     Quit date: 11/7/1993    Smokeless tobacco: Never Used    Alcohol use 1.2 oz/week     2 Glasses of wine per week     Current Outpatient Prescriptions   Medication Sig Dispense Refill    hydrOXYzine (ATARAX) 25 MG tablet Take 1 tablet by mouth nightly as needed (intersitial cystitis) 90 tablet 3    pantoprazole (PROTONIX) 40 MG tablet Take 1 tablet by mouth daily 90 tablet 3    montelukast (SINGULAIR) 10 MG tablet take 1 tablet by mouth every evening 90

## 2019-02-04 ENCOUNTER — TELEPHONE (OUTPATIENT)
Dept: FAMILY MEDICINE CLINIC | Age: 69
End: 2019-02-04

## 2019-02-04 DIAGNOSIS — N95.0 POST-MENOPAUSAL BLEEDING: Primary | ICD-10-CM

## 2019-02-05 ENCOUNTER — HOSPITAL ENCOUNTER (OUTPATIENT)
Dept: ULTRASOUND IMAGING | Age: 69
Discharge: HOME | End: 2019-02-07
Payer: MEDICARE

## 2019-02-05 DIAGNOSIS — N95.0 POST-MENOPAUSAL BLEEDING: ICD-10-CM

## 2019-02-05 PROCEDURE — 76856 US EXAM PELVIC COMPLETE: CPT

## 2019-02-07 ENCOUNTER — OFFICE VISIT (OUTPATIENT)
Dept: OBGYN | Age: 69
End: 2019-02-07
Payer: MEDICARE

## 2019-02-07 ENCOUNTER — HOSPITAL ENCOUNTER (OUTPATIENT)
Age: 69
Setting detail: SPECIMEN
Discharge: HOME | End: 2019-02-07
Payer: MEDICARE

## 2019-02-07 VITALS
RESPIRATION RATE: 20 BRPM | HEART RATE: 84 BPM | DIASTOLIC BLOOD PRESSURE: 84 MMHG | HEIGHT: 68 IN | WEIGHT: 218 LBS | SYSTOLIC BLOOD PRESSURE: 114 MMHG | BODY MASS INDEX: 33.04 KG/M2

## 2019-02-07 DIAGNOSIS — R93.89 ENDOMETRIAL THICKENING ON ULTRASOUND: ICD-10-CM

## 2019-02-07 DIAGNOSIS — D25.1 INTRAMURAL LEIOMYOMA OF UTERUS: ICD-10-CM

## 2019-02-07 DIAGNOSIS — N95.0 POSTMENOPAUSAL BLEEDING: ICD-10-CM

## 2019-02-07 DIAGNOSIS — N95.0 POSTMENOPAUSAL BLEEDING: Primary | ICD-10-CM

## 2019-02-07 PROCEDURE — 58100 BIOPSY OF UTERUS LINING: CPT | Performed by: OBSTETRICS & GYNECOLOGY

## 2019-02-07 PROCEDURE — 88305 TISSUE EXAM BY PATHOLOGIST: CPT

## 2019-02-08 LAB — COMMENT: NORMAL

## 2019-02-11 LAB — SURGICAL PATHOLOGY REPORT: NORMAL

## 2019-02-21 ENCOUNTER — OFFICE VISIT (OUTPATIENT)
Dept: OBGYN | Age: 69
End: 2019-02-21
Payer: MEDICARE

## 2019-02-21 VITALS
BODY MASS INDEX: 33.04 KG/M2 | DIASTOLIC BLOOD PRESSURE: 78 MMHG | WEIGHT: 218 LBS | SYSTOLIC BLOOD PRESSURE: 110 MMHG | RESPIRATION RATE: 20 BRPM | HEIGHT: 68 IN | HEART RATE: 82 BPM

## 2019-02-21 DIAGNOSIS — R93.89 ENDOMETRIAL THICKENING ON ULTRASOUND: ICD-10-CM

## 2019-02-21 DIAGNOSIS — Z01.818 PREOPERATIVE TESTING: ICD-10-CM

## 2019-02-21 DIAGNOSIS — D25.1 INTRAMURAL LEIOMYOMA OF UTERUS: ICD-10-CM

## 2019-02-21 DIAGNOSIS — N95.0 POSTMENOPAUSAL BLEEDING: Primary | ICD-10-CM

## 2019-02-21 DIAGNOSIS — N84.1 ENDOCERVICAL POLYP: ICD-10-CM

## 2019-02-21 PROCEDURE — 99212 OFFICE O/P EST SF 10 MIN: CPT | Performed by: OBSTETRICS & GYNECOLOGY

## 2019-02-21 RX ORDER — PSEUDOEPHEDRINE HCL 120 MG/1
120 TABLET, FILM COATED, EXTENDED RELEASE ORAL EVERY 12 HOURS
COMMUNITY
End: 2022-06-17

## 2019-02-23 ENCOUNTER — OFFICE VISIT (OUTPATIENT)
Dept: PRIMARY CARE CLINIC | Age: 69
End: 2019-02-23
Payer: MEDICARE

## 2019-02-23 VITALS
SYSTOLIC BLOOD PRESSURE: 110 MMHG | HEART RATE: 76 BPM | BODY MASS INDEX: 32.85 KG/M2 | DIASTOLIC BLOOD PRESSURE: 70 MMHG | WEIGHT: 216 LBS | OXYGEN SATURATION: 93 % | TEMPERATURE: 99 F

## 2019-02-23 DIAGNOSIS — J01.40 ACUTE PANSINUSITIS, RECURRENCE NOT SPECIFIED: Primary | ICD-10-CM

## 2019-02-23 DIAGNOSIS — J40 BRONCHITIS: ICD-10-CM

## 2019-02-23 PROCEDURE — G8427 DOCREV CUR MEDS BY ELIG CLIN: HCPCS | Performed by: FAMILY MEDICINE

## 2019-02-23 PROCEDURE — 1036F TOBACCO NON-USER: CPT | Performed by: FAMILY MEDICINE

## 2019-02-23 PROCEDURE — 3017F COLORECTAL CA SCREEN DOC REV: CPT | Performed by: FAMILY MEDICINE

## 2019-02-23 PROCEDURE — 1101F PT FALLS ASSESS-DOCD LE1/YR: CPT | Performed by: FAMILY MEDICINE

## 2019-02-23 PROCEDURE — 1090F PRES/ABSN URINE INCON ASSESS: CPT | Performed by: FAMILY MEDICINE

## 2019-02-23 PROCEDURE — 4040F PNEUMOC VAC/ADMIN/RCVD: CPT | Performed by: FAMILY MEDICINE

## 2019-02-23 PROCEDURE — G8417 CALC BMI ABV UP PARAM F/U: HCPCS | Performed by: FAMILY MEDICINE

## 2019-02-23 PROCEDURE — 99214 OFFICE O/P EST MOD 30 MIN: CPT | Performed by: FAMILY MEDICINE

## 2019-02-23 PROCEDURE — 1123F ACP DISCUSS/DSCN MKR DOCD: CPT | Performed by: FAMILY MEDICINE

## 2019-02-23 PROCEDURE — G8484 FLU IMMUNIZE NO ADMIN: HCPCS | Performed by: FAMILY MEDICINE

## 2019-02-23 PROCEDURE — G8399 PT W/DXA RESULTS DOCUMENT: HCPCS | Performed by: FAMILY MEDICINE

## 2019-02-23 RX ORDER — PREDNISONE 20 MG/1
20 TABLET ORAL 2 TIMES DAILY
Qty: 6 TABLET | Refills: 0 | Status: SHIPPED | OUTPATIENT
Start: 2019-02-23 | End: 2019-02-26

## 2019-02-23 RX ORDER — CEFUROXIME AXETIL 500 MG/1
500 TABLET ORAL 2 TIMES DAILY
Qty: 20 TABLET | Refills: 0 | Status: SHIPPED | OUTPATIENT
Start: 2019-02-23 | End: 2019-03-05

## 2019-02-23 RX ORDER — BENZONATATE 200 MG/1
200 CAPSULE ORAL 3 TIMES DAILY PRN
Qty: 30 CAPSULE | Refills: 0 | Status: SHIPPED | OUTPATIENT
Start: 2019-02-23 | End: 2019-03-02

## 2019-03-03 ENCOUNTER — OFFICE VISIT (OUTPATIENT)
Dept: PRIMARY CARE CLINIC | Age: 69
End: 2019-03-03
Payer: MEDICARE

## 2019-03-03 VITALS
DIASTOLIC BLOOD PRESSURE: 72 MMHG | SYSTOLIC BLOOD PRESSURE: 112 MMHG | BODY MASS INDEX: 33.62 KG/M2 | RESPIRATION RATE: 16 BRPM | HEIGHT: 68 IN | OXYGEN SATURATION: 94 % | HEART RATE: 68 BPM | WEIGHT: 221.8 LBS | TEMPERATURE: 97.7 F

## 2019-03-03 DIAGNOSIS — J01.90 ACUTE SINUSITIS, RECURRENCE NOT SPECIFIED, UNSPECIFIED LOCATION: ICD-10-CM

## 2019-03-03 DIAGNOSIS — J40 BRONCHITIS: Primary | ICD-10-CM

## 2019-03-03 PROCEDURE — G8417 CALC BMI ABV UP PARAM F/U: HCPCS | Performed by: NURSE PRACTITIONER

## 2019-03-03 PROCEDURE — 1123F ACP DISCUSS/DSCN MKR DOCD: CPT | Performed by: NURSE PRACTITIONER

## 2019-03-03 PROCEDURE — 99213 OFFICE O/P EST LOW 20 MIN: CPT | Performed by: NURSE PRACTITIONER

## 2019-03-03 PROCEDURE — 3017F COLORECTAL CA SCREEN DOC REV: CPT | Performed by: NURSE PRACTITIONER

## 2019-03-03 PROCEDURE — 1036F TOBACCO NON-USER: CPT | Performed by: NURSE PRACTITIONER

## 2019-03-03 PROCEDURE — G8427 DOCREV CUR MEDS BY ELIG CLIN: HCPCS | Performed by: NURSE PRACTITIONER

## 2019-03-03 PROCEDURE — G8399 PT W/DXA RESULTS DOCUMENT: HCPCS | Performed by: NURSE PRACTITIONER

## 2019-03-03 PROCEDURE — 1090F PRES/ABSN URINE INCON ASSESS: CPT | Performed by: NURSE PRACTITIONER

## 2019-03-03 PROCEDURE — 1101F PT FALLS ASSESS-DOCD LE1/YR: CPT | Performed by: NURSE PRACTITIONER

## 2019-03-03 PROCEDURE — G8484 FLU IMMUNIZE NO ADMIN: HCPCS | Performed by: NURSE PRACTITIONER

## 2019-03-03 PROCEDURE — 4040F PNEUMOC VAC/ADMIN/RCVD: CPT | Performed by: NURSE PRACTITIONER

## 2019-03-03 RX ORDER — ALBUTEROL SULFATE 90 UG/1
2 AEROSOL, METERED RESPIRATORY (INHALATION) EVERY 6 HOURS PRN
Qty: 1 INHALER | Refills: 1 | Status: SHIPPED | OUTPATIENT
Start: 2019-03-03 | End: 2019-08-21

## 2019-03-03 RX ORDER — AMOXICILLIN 875 MG/1
875 TABLET, COATED ORAL 2 TIMES DAILY
Qty: 20 TABLET | Refills: 0 | Status: ON HOLD | OUTPATIENT
Start: 2019-03-03 | End: 2019-03-12 | Stop reason: HOSPADM

## 2019-03-03 RX ORDER — METHYLPREDNISOLONE 4 MG/1
TABLET ORAL
Qty: 1 KIT | Refills: 0 | Status: SHIPPED | OUTPATIENT
Start: 2019-03-03 | End: 2019-03-09

## 2019-03-03 ASSESSMENT — ENCOUNTER SYMPTOMS
COUGH: 1
RHINORRHEA: 1
WHEEZING: 1
SINUS PAIN: 1
SHORTNESS OF BREATH: 1
SINUS PRESSURE: 1
GASTROINTESTINAL NEGATIVE: 1

## 2019-03-07 ENCOUNTER — TELEPHONE (OUTPATIENT)
Dept: OBGYN | Age: 69
End: 2019-03-07

## 2019-03-07 DIAGNOSIS — Z01.818 PREOPERATIVE TESTING: Primary | ICD-10-CM

## 2019-03-11 ENCOUNTER — HOSPITAL ENCOUNTER (OUTPATIENT)
Dept: LAB | Age: 69
Discharge: HOME | End: 2019-03-11
Payer: MEDICARE

## 2019-03-11 ENCOUNTER — HOSPITAL ENCOUNTER (OUTPATIENT)
Dept: NON INVASIVE DIAGNOSTICS | Age: 69
Discharge: HOME | End: 2019-03-11
Payer: MEDICARE

## 2019-03-11 ENCOUNTER — PREP FOR PROCEDURE (OUTPATIENT)
Dept: OBGYN | Age: 69
End: 2019-03-11

## 2019-03-11 DIAGNOSIS — Z01.818 PREOPERATIVE TESTING: ICD-10-CM

## 2019-03-11 DIAGNOSIS — N95.0 POSTMENOPAUSAL BLEEDING: ICD-10-CM

## 2019-03-11 LAB
ABSOLUTE EOS #: 0.2 K/UL (ref 0–0.4)
ABSOLUTE IMMATURE GRANULOCYTE: NORMAL K/UL (ref 0–0.3)
ABSOLUTE LYMPH #: 3.3 K/UL (ref 1–4.8)
ABSOLUTE MONO #: 0.8 K/UL (ref 0.1–1.2)
BASOPHILS # BLD: 1 % (ref 0–1)
BASOPHILS ABSOLUTE: 0.1 K/UL (ref 0–0.2)
DIFFERENTIAL TYPE: NORMAL
EOSINOPHILS RELATIVE PERCENT: 2 % (ref 1–7)
HCT VFR BLD CALC: 45.5 % (ref 36–46)
HEMOGLOBIN: 14.6 G/DL (ref 12–16)
IMMATURE GRANULOCYTES: NORMAL %
LYMPHOCYTES # BLD: 33 % (ref 16–46)
MCH RBC QN AUTO: 29.5 PG (ref 26–34)
MCHC RBC AUTO-ENTMCNC: 32.1 G/DL (ref 31–37)
MCV RBC AUTO: 92.1 FL (ref 80–100)
MONOCYTES # BLD: 8 % (ref 4–11)
NRBC AUTOMATED: NORMAL PER 100 WBC
PDW BLD-RTO: 13.9 % (ref 11–14.5)
PLATELET # BLD: 278 K/UL (ref 140–450)
PLATELET ESTIMATE: NORMAL
PMV BLD AUTO: 9.1 FL (ref 6–12)
RBC # BLD: 4.94 M/UL (ref 4–5.2)
RBC # BLD: NORMAL 10*6/UL
SEG NEUTROPHILS: 56 % (ref 43–77)
SEGMENTED NEUTROPHILS ABSOLUTE COUNT: 5.7 K/UL (ref 1.8–7.7)
WBC # BLD: 10 K/UL (ref 3.5–11)
WBC # BLD: NORMAL 10*3/UL

## 2019-03-11 PROCEDURE — 36415 COLL VENOUS BLD VENIPUNCTURE: CPT

## 2019-03-11 PROCEDURE — 85025 COMPLETE CBC W/AUTO DIFF WBC: CPT

## 2019-03-11 PROCEDURE — 93005 ELECTROCARDIOGRAM TRACING: CPT

## 2019-03-11 RX ORDER — SODIUM CHLORIDE 0.9 % (FLUSH) 0.9 %
10 SYRINGE (ML) INJECTION PRN
Status: CANCELLED | OUTPATIENT
Start: 2019-03-11

## 2019-03-11 RX ORDER — SODIUM CHLORIDE 0.9 % (FLUSH) 0.9 %
10 SYRINGE (ML) INJECTION EVERY 12 HOURS SCHEDULED
Status: CANCELLED | OUTPATIENT
Start: 2019-03-11

## 2019-03-12 ENCOUNTER — ANESTHESIA EVENT (OUTPATIENT)
Dept: OPERATING ROOM | Age: 69
End: 2019-03-12
Payer: MEDICARE

## 2019-03-12 ENCOUNTER — ANESTHESIA (OUTPATIENT)
Dept: OPERATING ROOM | Age: 69
End: 2019-03-12
Payer: MEDICARE

## 2019-03-12 ENCOUNTER — HOSPITAL ENCOUNTER (OUTPATIENT)
Age: 69
Setting detail: OUTPATIENT SURGERY
Discharge: HOME | End: 2019-03-12
Attending: OBSTETRICS & GYNECOLOGY | Admitting: OBSTETRICS & GYNECOLOGY
Payer: MEDICARE

## 2019-03-12 VITALS
DIASTOLIC BLOOD PRESSURE: 79 MMHG | SYSTOLIC BLOOD PRESSURE: 148 MMHG | RESPIRATION RATE: 1 BRPM | TEMPERATURE: 97.8 F | OXYGEN SATURATION: 98 %

## 2019-03-12 VITALS
DIASTOLIC BLOOD PRESSURE: 61 MMHG | BODY MASS INDEX: 32.95 KG/M2 | SYSTOLIC BLOOD PRESSURE: 131 MMHG | WEIGHT: 217.4 LBS | HEART RATE: 66 BPM | HEIGHT: 68 IN | RESPIRATION RATE: 16 BRPM | OXYGEN SATURATION: 96 % | TEMPERATURE: 98.2 F

## 2019-03-12 PROCEDURE — 00952 ANES VAG PX HYSTSC&/HSG: CPT | Performed by: NURSE ANESTHETIST, CERTIFIED REGISTERED

## 2019-03-12 PROCEDURE — 2709999900 HC NON-CHARGEABLE SUPPLY: Performed by: OBSTETRICS & GYNECOLOGY

## 2019-03-12 PROCEDURE — 7100000011 HC PHASE II RECOVERY - ADDTL 15 MIN: Performed by: OBSTETRICS & GYNECOLOGY

## 2019-03-12 PROCEDURE — 2500000003 HC RX 250 WO HCPCS

## 2019-03-12 PROCEDURE — 3600000003 HC SURGERY LEVEL 3 BASE: Performed by: OBSTETRICS & GYNECOLOGY

## 2019-03-12 PROCEDURE — 7100000001 HC PACU RECOVERY - ADDTL 15 MIN: Performed by: OBSTETRICS & GYNECOLOGY

## 2019-03-12 PROCEDURE — 6360000002 HC RX W HCPCS: Performed by: OBSTETRICS & GYNECOLOGY

## 2019-03-12 PROCEDURE — 3700000000 HC ANESTHESIA ATTENDED CARE: Performed by: OBSTETRICS & GYNECOLOGY

## 2019-03-12 PROCEDURE — 6360000002 HC RX W HCPCS

## 2019-03-12 PROCEDURE — 2580000003 HC RX 258: Performed by: OBSTETRICS & GYNECOLOGY

## 2019-03-12 PROCEDURE — 3600000013 HC SURGERY LEVEL 3 ADDTL 15MIN: Performed by: OBSTETRICS & GYNECOLOGY

## 2019-03-12 PROCEDURE — 7100000010 HC PHASE II RECOVERY - FIRST 15 MIN: Performed by: OBSTETRICS & GYNECOLOGY

## 2019-03-12 PROCEDURE — 2500000003 HC RX 250 WO HCPCS: Performed by: NURSE ANESTHETIST, CERTIFIED REGISTERED

## 2019-03-12 PROCEDURE — 6360000002 HC RX W HCPCS: Performed by: NURSE ANESTHETIST, CERTIFIED REGISTERED

## 2019-03-12 PROCEDURE — 7100000000 HC PACU RECOVERY - FIRST 15 MIN: Performed by: OBSTETRICS & GYNECOLOGY

## 2019-03-12 PROCEDURE — 88305 TISSUE EXAM BY PATHOLOGIST: CPT

## 2019-03-12 PROCEDURE — 3700000001 HC ADD 15 MINUTES (ANESTHESIA): Performed by: OBSTETRICS & GYNECOLOGY

## 2019-03-12 RX ORDER — FENTANYL CITRATE 50 UG/ML
INJECTION, SOLUTION INTRAMUSCULAR; INTRAVENOUS PRN
Status: DISCONTINUED | OUTPATIENT
Start: 2019-03-12 | End: 2019-03-12 | Stop reason: SDUPTHER

## 2019-03-12 RX ORDER — LIDOCAINE HYDROCHLORIDE 40 MG/ML
INJECTION, SOLUTION RETROBULBAR; TOPICAL PRN
Status: DISCONTINUED | OUTPATIENT
Start: 2019-03-12 | End: 2019-03-12 | Stop reason: SDUPTHER

## 2019-03-12 RX ORDER — ONDANSETRON 2 MG/ML
INJECTION INTRAMUSCULAR; INTRAVENOUS PRN
Status: DISCONTINUED | OUTPATIENT
Start: 2019-03-12 | End: 2019-03-12 | Stop reason: SDUPTHER

## 2019-03-12 RX ORDER — SODIUM CHLORIDE 0.9 % (FLUSH) 0.9 %
10 SYRINGE (ML) INJECTION EVERY 12 HOURS SCHEDULED
Status: DISCONTINUED | OUTPATIENT
Start: 2019-03-12 | End: 2019-03-12 | Stop reason: HOSPADM

## 2019-03-12 RX ORDER — SODIUM CHLORIDE, SODIUM LACTATE, POTASSIUM CHLORIDE, CALCIUM CHLORIDE 600; 310; 30; 20 MG/100ML; MG/100ML; MG/100ML; MG/100ML
INJECTION, SOLUTION INTRAVENOUS CONTINUOUS
Status: DISCONTINUED | OUTPATIENT
Start: 2019-03-12 | End: 2019-03-12 | Stop reason: HOSPADM

## 2019-03-12 RX ORDER — ACETAMINOPHEN 325 MG/1
650 TABLET ORAL EVERY 4 HOURS PRN
Status: DISCONTINUED | OUTPATIENT
Start: 2019-03-12 | End: 2019-03-12 | Stop reason: HOSPADM

## 2019-03-12 RX ORDER — SODIUM CHLORIDE 0.9 % (FLUSH) 0.9 %
10 SYRINGE (ML) INJECTION PRN
Status: DISCONTINUED | OUTPATIENT
Start: 2019-03-12 | End: 2019-03-12 | Stop reason: HOSPADM

## 2019-03-12 RX ORDER — MORPHINE SULFATE 2 MG/ML
2 INJECTION, SOLUTION INTRAMUSCULAR; INTRAVENOUS
Status: DISCONTINUED | OUTPATIENT
Start: 2019-03-12 | End: 2019-03-12 | Stop reason: HOSPADM

## 2019-03-12 RX ORDER — GLYCOPYRROLATE 1 MG/5 ML
SYRINGE (ML) INTRAVENOUS PRN
Status: DISCONTINUED | OUTPATIENT
Start: 2019-03-12 | End: 2019-03-12 | Stop reason: SDUPTHER

## 2019-03-12 RX ORDER — PROPOFOL 10 MG/ML
INJECTION, EMULSION INTRAVENOUS PRN
Status: DISCONTINUED | OUTPATIENT
Start: 2019-03-12 | End: 2019-03-12 | Stop reason: SDUPTHER

## 2019-03-12 RX ORDER — DEXAMETHASONE SODIUM PHOSPHATE 4 MG/ML
INJECTION, SOLUTION INTRA-ARTICULAR; INTRALESIONAL; INTRAMUSCULAR; INTRAVENOUS; SOFT TISSUE PRN
Status: DISCONTINUED | OUTPATIENT
Start: 2019-03-12 | End: 2019-03-12 | Stop reason: SDUPTHER

## 2019-03-12 RX ORDER — KETOROLAC TROMETHAMINE 30 MG/ML
INJECTION, SOLUTION INTRAMUSCULAR; INTRAVENOUS PRN
Status: DISCONTINUED | OUTPATIENT
Start: 2019-03-12 | End: 2019-03-12 | Stop reason: SDUPTHER

## 2019-03-12 RX ORDER — HYDROCODONE BITARTRATE AND ACETAMINOPHEN 5; 325 MG/1; MG/1
1 TABLET ORAL EVERY 4 HOURS PRN
Status: DISCONTINUED | OUTPATIENT
Start: 2019-03-12 | End: 2019-03-12 | Stop reason: HOSPADM

## 2019-03-12 RX ADMIN — FENTANYL CITRATE 25 MCG: 50 INJECTION, SOLUTION INTRAMUSCULAR; INTRAVENOUS at 08:50

## 2019-03-12 RX ADMIN — SODIUM CHLORIDE, POTASSIUM CHLORIDE, SODIUM LACTATE AND CALCIUM CHLORIDE: 600; 310; 30; 20 INJECTION, SOLUTION INTRAVENOUS at 07:58

## 2019-03-12 RX ADMIN — Medication 0.2 MG: at 08:27

## 2019-03-12 RX ADMIN — Medication 2 G: at 08:19

## 2019-03-12 RX ADMIN — PROPOFOL 50 MG: 10 INJECTION, EMULSION INTRAVENOUS at 08:12

## 2019-03-12 RX ADMIN — DEXAMETHASONE SODIUM PHOSPHATE 4 MG: 4 INJECTION, SOLUTION INTRAMUSCULAR; INTRAVENOUS at 08:19

## 2019-03-12 RX ADMIN — PROPOFOL 150 MG: 10 INJECTION, EMULSION INTRAVENOUS at 08:11

## 2019-03-12 RX ADMIN — LIDOCAINE HYDROCHLORIDE 50 MG: 40 INJECTION, SOLUTION RETROBULBAR; TOPICAL at 08:11

## 2019-03-12 RX ADMIN — KETOROLAC TROMETHAMINE 30 MG: 30 INJECTION, SOLUTION INTRAMUSCULAR; INTRAVENOUS at 08:39

## 2019-03-12 RX ADMIN — FENTANYL CITRATE 25 MCG: 50 INJECTION, SOLUTION INTRAMUSCULAR; INTRAVENOUS at 08:20

## 2019-03-12 RX ADMIN — ONDANSETRON 4 MG: 2 INJECTION INTRAMUSCULAR; INTRAVENOUS at 08:39

## 2019-03-12 ASSESSMENT — PULMONARY FUNCTION TESTS
PIF_VALUE: 3
PIF_VALUE: 4
PIF_VALUE: 4
PIF_VALUE: 1
PIF_VALUE: 5
PIF_VALUE: 1
PIF_VALUE: 2
PIF_VALUE: 3
PIF_VALUE: 1
PIF_VALUE: 3
PIF_VALUE: 19
PIF_VALUE: 1
PIF_VALUE: 1
PIF_VALUE: 3
PIF_VALUE: 1
PIF_VALUE: 16
PIF_VALUE: 16
PIF_VALUE: 3
PIF_VALUE: 1
PIF_VALUE: 3
PIF_VALUE: 1
PIF_VALUE: 1
PIF_VALUE: 2
PIF_VALUE: 1
PIF_VALUE: 24
PIF_VALUE: 4
PIF_VALUE: 1

## 2019-03-12 ASSESSMENT — PAIN DESCRIPTION - FREQUENCY: FREQUENCY: CONTINUOUS

## 2019-03-12 ASSESSMENT — PAIN DESCRIPTION - LOCATION: LOCATION: ABDOMEN

## 2019-03-12 ASSESSMENT — PAIN SCALES - GENERAL
PAINLEVEL_OUTOF10: 0
PAINLEVEL_OUTOF10: 4
PAINLEVEL_OUTOF10: 0
PAINLEVEL_OUTOF10: 3
PAINLEVEL_OUTOF10: 2
PAINLEVEL_OUTOF10: 0
PAINLEVEL_OUTOF10: 0

## 2019-03-12 ASSESSMENT — PAIN - FUNCTIONAL ASSESSMENT: PAIN_FUNCTIONAL_ASSESSMENT: 0-10

## 2019-03-12 ASSESSMENT — PAIN DESCRIPTION - PAIN TYPE: TYPE: SURGICAL PAIN

## 2019-03-12 ASSESSMENT — PAIN DESCRIPTION - DESCRIPTORS
DESCRIPTORS: CRAMPING
DESCRIPTORS: CRAMPING

## 2019-03-13 LAB
EKG ATRIAL RATE: 66 BPM
EKG P AXIS: 53 DEGREES
EKG P-R INTERVAL: 156 MS
EKG Q-T INTERVAL: 422 MS
EKG QRS DURATION: 124 MS
EKG QTC CALCULATION (BAZETT): 442 MS
EKG R AXIS: -21 DEGREES
EKG T AXIS: -21 DEGREES
EKG VENTRICULAR RATE: 66 BPM
SURGICAL PATHOLOGY REPORT: NORMAL

## 2019-03-26 ENCOUNTER — OFFICE VISIT (OUTPATIENT)
Dept: OBGYN | Age: 69
End: 2019-03-26
Payer: MEDICARE

## 2019-03-26 VITALS
HEART RATE: 66 BPM | WEIGHT: 218 LBS | SYSTOLIC BLOOD PRESSURE: 124 MMHG | BODY MASS INDEX: 33.04 KG/M2 | HEIGHT: 68 IN | RESPIRATION RATE: 20 BRPM | DIASTOLIC BLOOD PRESSURE: 70 MMHG

## 2019-03-26 DIAGNOSIS — N84.0 ENDOMETRIAL POLYP: ICD-10-CM

## 2019-03-26 DIAGNOSIS — Z09 POSTOP CHECK: Primary | ICD-10-CM

## 2019-03-26 DIAGNOSIS — D25.1 INTRAMURAL LEIOMYOMA OF UTERUS: ICD-10-CM

## 2019-03-26 DIAGNOSIS — N95.0 POSTMENOPAUSAL BLEEDING: ICD-10-CM

## 2019-03-26 PROCEDURE — 99212 OFFICE O/P EST SF 10 MIN: CPT | Performed by: OBSTETRICS & GYNECOLOGY

## 2019-03-26 ASSESSMENT — PATIENT HEALTH QUESTIONNAIRE - PHQ9
SUM OF ALL RESPONSES TO PHQ QUESTIONS 1-9: 0
SUM OF ALL RESPONSES TO PHQ QUESTIONS 1-9: 0
2. FEELING DOWN, DEPRESSED OR HOPELESS: 0
SUM OF ALL RESPONSES TO PHQ9 QUESTIONS 1 & 2: 0
1. LITTLE INTEREST OR PLEASURE IN DOING THINGS: 0

## 2019-05-14 ENCOUNTER — HOSPITAL ENCOUNTER (OUTPATIENT)
Dept: LAB | Age: 69
Discharge: HOME | End: 2019-05-14
Payer: MEDICARE

## 2019-05-14 ENCOUNTER — OFFICE VISIT (OUTPATIENT)
Dept: FAMILY MEDICINE CLINIC | Age: 69
End: 2019-05-14
Payer: MEDICARE

## 2019-05-14 VITALS — HEART RATE: 67 BPM | OXYGEN SATURATION: 93 % | DIASTOLIC BLOOD PRESSURE: 68 MMHG | SYSTOLIC BLOOD PRESSURE: 108 MMHG

## 2019-05-14 DIAGNOSIS — F33.0 MILD EPISODE OF RECURRENT MAJOR DEPRESSIVE DISORDER (HCC): Primary | ICD-10-CM

## 2019-05-14 DIAGNOSIS — R73.01 IMPAIRED FASTING GLUCOSE: ICD-10-CM

## 2019-05-14 DIAGNOSIS — E78.2 MIXED HYPERLIPIDEMIA: ICD-10-CM

## 2019-05-14 DIAGNOSIS — Z13.6 SCREENING FOR ISCHEMIC HEART DISEASE: ICD-10-CM

## 2019-05-14 PROBLEM — I48.92 ATRIAL FLUTTER (HCC): Status: ACTIVE | Noted: 2019-05-14

## 2019-05-14 LAB
ANION GAP SERPL CALCULATED.3IONS-SCNC: 9 MMOL/L (ref 9–17)
BUN BLDV-MCNC: 19 MG/DL (ref 8–23)
BUN/CREAT BLD: 29 (ref 9–20)
CALCIUM SERPL-MCNC: 9.6 MG/DL (ref 8.6–10.4)
CHLORIDE BLD-SCNC: 104 MMOL/L (ref 98–107)
CHOLESTEROL/HDL RATIO: 5.8
CHOLESTEROL: 285 MG/DL
CO2: 30 MMOL/L (ref 20–31)
CREAT SERPL-MCNC: 0.65 MG/DL (ref 0.5–0.9)
ESTIMATED AVERAGE GLUCOSE: 114 MG/DL
GFR AFRICAN AMERICAN: >60 ML/MIN
GFR NON-AFRICAN AMERICAN: >60 ML/MIN
GFR SERPL CREATININE-BSD FRML MDRD: ABNORMAL ML/MIN/{1.73_M2}
GFR SERPL CREATININE-BSD FRML MDRD: ABNORMAL ML/MIN/{1.73_M2}
GLUCOSE BLD-MCNC: 108 MG/DL (ref 70–99)
HBA1C MFR BLD: 5.6 % (ref 4.8–5.9)
HDLC SERPL-MCNC: 49 MG/DL
LDL CHOLESTEROL: 186 MG/DL (ref 0–130)
POTASSIUM SERPL-SCNC: 4.9 MMOL/L (ref 3.7–5.3)
SODIUM BLD-SCNC: 143 MMOL/L (ref 135–144)
TRIGL SERPL-MCNC: 250 MG/DL
VLDLC SERPL CALC-MCNC: ABNORMAL MG/DL (ref 1–30)

## 2019-05-14 PROCEDURE — G8399 PT W/DXA RESULTS DOCUMENT: HCPCS | Performed by: FAMILY MEDICINE

## 2019-05-14 PROCEDURE — 83036 HEMOGLOBIN GLYCOSYLATED A1C: CPT

## 2019-05-14 PROCEDURE — 1123F ACP DISCUSS/DSCN MKR DOCD: CPT | Performed by: FAMILY MEDICINE

## 2019-05-14 PROCEDURE — 36415 COLL VENOUS BLD VENIPUNCTURE: CPT

## 2019-05-14 PROCEDURE — 3017F COLORECTAL CA SCREEN DOC REV: CPT | Performed by: FAMILY MEDICINE

## 2019-05-14 PROCEDURE — G8417 CALC BMI ABV UP PARAM F/U: HCPCS | Performed by: FAMILY MEDICINE

## 2019-05-14 PROCEDURE — 4040F PNEUMOC VAC/ADMIN/RCVD: CPT | Performed by: FAMILY MEDICINE

## 2019-05-14 PROCEDURE — 80048 BASIC METABOLIC PNL TOTAL CA: CPT

## 2019-05-14 PROCEDURE — G8427 DOCREV CUR MEDS BY ELIG CLIN: HCPCS | Performed by: FAMILY MEDICINE

## 2019-05-14 PROCEDURE — 1090F PRES/ABSN URINE INCON ASSESS: CPT | Performed by: FAMILY MEDICINE

## 2019-05-14 PROCEDURE — 80061 LIPID PANEL: CPT

## 2019-05-14 PROCEDURE — 1036F TOBACCO NON-USER: CPT | Performed by: FAMILY MEDICINE

## 2019-05-14 PROCEDURE — 99213 OFFICE O/P EST LOW 20 MIN: CPT | Performed by: FAMILY MEDICINE

## 2019-05-14 RX ORDER — PANTOPRAZOLE SODIUM 40 MG/1
40 TABLET, DELAYED RELEASE ORAL DAILY
COMMUNITY
End: 2019-11-05 | Stop reason: SDUPTHER

## 2019-05-14 RX ORDER — M-VIT,TX,IRON,MINS/CALC/FOLIC 27MG-0.4MG
1 TABLET ORAL DAILY
COMMUNITY
End: 2020-05-12

## 2019-05-14 RX ORDER — FLUOXETINE HYDROCHLORIDE 40 MG/1
40 CAPSULE ORAL DAILY
Qty: 90 CAPSULE | Refills: 3 | Status: SHIPPED | OUTPATIENT
Start: 2019-05-14 | End: 2020-04-27

## 2019-05-14 ASSESSMENT — ENCOUNTER SYMPTOMS
NAUSEA: 0
COUGH: 0
CHEST TIGHTNESS: 0
DIARRHEA: 0
SHORTNESS OF BREATH: 0
ABDOMINAL PAIN: 0
CONSTIPATION: 0
WHEEZING: 0

## 2019-06-18 ENCOUNTER — HOSPITAL ENCOUNTER (OUTPATIENT)
Dept: ULTRASOUND IMAGING | Age: 69
Discharge: HOME | End: 2019-06-20
Payer: MEDICARE

## 2019-06-18 DIAGNOSIS — N95.0 POSTMENOPAUSAL BLEEDING: ICD-10-CM

## 2019-06-18 DIAGNOSIS — N84.0 ENDOMETRIAL POLYP: ICD-10-CM

## 2019-06-18 DIAGNOSIS — D25.1 INTRAMURAL LEIOMYOMA OF UTERUS: ICD-10-CM

## 2019-06-18 PROCEDURE — 76830 TRANSVAGINAL US NON-OB: CPT

## 2019-06-18 PROCEDURE — 76856 US EXAM PELVIC COMPLETE: CPT

## 2019-07-02 ENCOUNTER — OFFICE VISIT (OUTPATIENT)
Dept: OBGYN | Age: 69
End: 2019-07-02
Payer: MEDICARE

## 2019-07-02 VITALS
RESPIRATION RATE: 18 BRPM | BODY MASS INDEX: 33.19 KG/M2 | DIASTOLIC BLOOD PRESSURE: 78 MMHG | HEART RATE: 72 BPM | HEIGHT: 68 IN | SYSTOLIC BLOOD PRESSURE: 124 MMHG | WEIGHT: 219 LBS

## 2019-07-02 DIAGNOSIS — N95.0 POSTMENOPAUSAL BLEEDING: Primary | ICD-10-CM

## 2019-07-02 DIAGNOSIS — R93.89 ENDOMETRIAL THICKENING ON ULTRASOUND: ICD-10-CM

## 2019-07-02 DIAGNOSIS — D25.1 INTRAMURAL LEIOMYOMA OF UTERUS: ICD-10-CM

## 2019-07-02 PROCEDURE — 99212 OFFICE O/P EST SF 10 MIN: CPT | Performed by: OBSTETRICS & GYNECOLOGY

## 2019-07-02 NOTE — PROGRESS NOTES
Celebrex [Celecoxib] Rash     Whole body       Current Outpatient Medications:     pantoprazole (PROTONIX) 40 MG tablet, Take 40 mg by mouth daily, Disp: , Rfl:     CALCIUM-MAGNESIUM-VITAMIN D PO, Take by mouth, Disp: , Rfl:     Multiple Vitamins-Minerals (THERAPEUTIC MULTIVITAMIN-MINERALS) tablet, Take 1 tablet by mouth daily, Disp: , Rfl:     FLUoxetine (PROZAC) 40 MG capsule, Take 1 capsule by mouth daily, Disp: 90 capsule, Rfl: 3    albuterol sulfate HFA (VENTOLIN HFA) 108 (90 Base) MCG/ACT inhaler, Inhale 2 puffs into the lungs every 6 hours as needed for Wheezing or Shortness of Breath, Disp: 1 Inhaler, Rfl: 1    dextromethorphan-guaiFENesin (MUCINEX DM)  MG per extended release tablet, Take 1 tablet by mouth every 12 hours as needed, Disp: , Rfl:     pseudoephedrine (SUDAFED 12 HR) 120 MG extended release tablet, Take 120 mg by mouth every 12 hours, Disp: , Rfl:     hydrOXYzine (ATARAX) 25 MG tablet, Take 1 tablet by mouth nightly as needed (intersitial cystitis), Disp: 90 tablet, Rfl: 3    montelukast (SINGULAIR) 10 MG tablet, take 1 tablet by mouth every evening, Disp: 90 tablet, Rfl: 3    buPROPion (WELLBUTRIN XL) 150 MG extended release tablet, Take 1 tablet by mouth every morning, Disp: 90 tablet, Rfl: 3    triamcinolone (KENALOG) 0.025 % cream, Apply Topically daily as needed for itching, Disp: 80 g, Rfl: 0    aspirin-acetaminophen-caffeine (EXCEDRIN MIGRAINE) 250-250-65 MG per tablet, Take 1 tablet by mouth as needed for Headaches, Disp: , Rfl:     fluticasone (FLONASE) 50 MCG/ACT nasal spray, by Nasal route, Disp: , Rfl:     ibuprofen (ADVIL;MOTRIN) 800 MG tablet, Take 1 tablet by mouth every 8 hours as needed for Pain, Disp: 30 tablet, Rfl: 0      Review of Systems  Breast ROS: negative    Objective:   /78 (Site: Left Upper Arm, Position: Sitting, Cuff Size: Large Adult)   Pulse 72   Resp 18   Ht 5' 7.99\" (1.727 m)   Wt 219 lb (99.3 kg)   BMI 33.31 kg/m²     Physical

## 2019-08-21 ENCOUNTER — OFFICE VISIT (OUTPATIENT)
Dept: FAMILY MEDICINE CLINIC | Age: 69
End: 2019-08-21
Payer: MEDICARE

## 2019-08-21 VITALS
HEIGHT: 68 IN | DIASTOLIC BLOOD PRESSURE: 82 MMHG | OXYGEN SATURATION: 96 % | WEIGHT: 219.4 LBS | SYSTOLIC BLOOD PRESSURE: 120 MMHG | HEART RATE: 61 BPM | BODY MASS INDEX: 33.25 KG/M2

## 2019-08-21 DIAGNOSIS — G43.701 CHRONIC MIGRAINE WITHOUT AURA WITH STATUS MIGRAINOSUS, NOT INTRACTABLE: Primary | ICD-10-CM

## 2019-08-21 PROCEDURE — G8399 PT W/DXA RESULTS DOCUMENT: HCPCS | Performed by: FAMILY MEDICINE

## 2019-08-21 PROCEDURE — 96372 THER/PROPH/DIAG INJ SC/IM: CPT | Performed by: FAMILY MEDICINE

## 2019-08-21 PROCEDURE — 3017F COLORECTAL CA SCREEN DOC REV: CPT | Performed by: FAMILY MEDICINE

## 2019-08-21 PROCEDURE — 1123F ACP DISCUSS/DSCN MKR DOCD: CPT | Performed by: FAMILY MEDICINE

## 2019-08-21 PROCEDURE — 99214 OFFICE O/P EST MOD 30 MIN: CPT | Performed by: FAMILY MEDICINE

## 2019-08-21 PROCEDURE — 1036F TOBACCO NON-USER: CPT | Performed by: FAMILY MEDICINE

## 2019-08-21 PROCEDURE — G8417 CALC BMI ABV UP PARAM F/U: HCPCS | Performed by: FAMILY MEDICINE

## 2019-08-21 PROCEDURE — 4040F PNEUMOC VAC/ADMIN/RCVD: CPT | Performed by: FAMILY MEDICINE

## 2019-08-21 PROCEDURE — 1090F PRES/ABSN URINE INCON ASSESS: CPT | Performed by: FAMILY MEDICINE

## 2019-08-21 PROCEDURE — G8427 DOCREV CUR MEDS BY ELIG CLIN: HCPCS | Performed by: FAMILY MEDICINE

## 2019-08-21 RX ORDER — KETOROLAC TROMETHAMINE 30 MG/ML
60 INJECTION, SOLUTION INTRAMUSCULAR; INTRAVENOUS ONCE
Status: COMPLETED | OUTPATIENT
Start: 2019-08-21 | End: 2019-08-21

## 2019-08-21 RX ORDER — BUTALBITAL, ACETAMINOPHEN AND CAFFEINE 50; 325; 40 MG/1; MG/1; MG/1
1 TABLET ORAL EVERY 6 HOURS PRN
Qty: 120 TABLET | Refills: 1 | Status: SHIPPED | OUTPATIENT
Start: 2019-08-21 | End: 2020-09-15

## 2019-08-21 RX ADMIN — KETOROLAC TROMETHAMINE 60 MG: 30 INJECTION, SOLUTION INTRAMUSCULAR; INTRAVENOUS at 12:44

## 2019-08-21 ASSESSMENT — ENCOUNTER SYMPTOMS
COUGH: 0
CHEST TIGHTNESS: 0
BLURRED VISION: 0
SINUS PRESSURE: 1
WHEEZING: 0
NAUSEA: 1
SHORTNESS OF BREATH: 0
PHOTOPHOBIA: 1

## 2019-08-21 NOTE — PROGRESS NOTES
[address]   [address]  [place] [address] [place]  Dept: [phone]  Dept Fax: [phone]  Loc: [phone]    Danae Tate is a 70 y.o. female who presents today for her medical conditions/complaints as noted below. Danae Tate is c/o of   Chief Complaint   Patient presents with    Migraine     x 6 days has been to chiropractor and has been using oils and nothing is helping       HPI:      Here today for migraines. Migraine    This is a recurrent problem. The current episode started 1 to 4 weeks ago (1 week). The problem occurs constantly. The problem has been unchanged. The pain is located in the left unilateral and vertex region. The pain does not radiate. The pain quality is similar to prior headaches. The quality of the pain is described as throbbing. The pain is at a severity of 9/10. The pain is severe. Associated symptoms include nausea, phonophobia, photophobia, sinus pressure and tinnitus. Pertinent negatives include no blurred vision, coughing, dizziness, fever, loss of balance or weakness. Associated symptoms comments: Congestion. The symptoms are aggravated by bright light. She has tried NSAIDs, Excedrin and darkened room (decongestants, B2, chiropractor) for the symptoms. The treatment provided no relief. Her past medical history is significant for migraine headaches.          Past Medical History:   Diagnosis Date    Allergic rhinitis     Anxiety     Asthma     Bronchitis     Chest pain     Chronic back pain     Depression     Fibromyalgia     GERD (gastroesophageal reflux disease)     Headache(784.0)     Heart palpitations     Irregular bowel habits     Macular degeneration     beginning    Measles     Osteoarthritis     Restless legs syndrome     Tachycardia     Unspecified sleep apnea     Wet senile macular degeneration (HCC)     bilateral- Sees a retinal Dr Ameena Giordano History     Tobacco Use    Smoking status: Former Smoker     Types: Cigarettes     Last attempt to quit: 1993     Years since quittin.8    Smokeless tobacco: Never Used   Substance Use Topics    Alcohol use: Yes     Alcohol/week: 2.0 standard drinks     Types: 2 Glasses of wine per week     Current Outpatient Medications   Medication Sig Dispense Refill    butalbital-acetaminophen-caffeine (FIORICET, ESGIC) -40 MG per tablet Take 1 tablet by mouth every 6 hours as needed for Headaches or Migraine 120 tablet 1    pantoprazole (PROTONIX) 40 MG tablet Take 40 mg by mouth daily      CALCIUM-MAGNESIUM-VITAMIN D PO Take by mouth      Multiple Vitamins-Minerals (THERAPEUTIC MULTIVITAMIN-MINERALS) tablet Take 1 tablet by mouth daily      FLUoxetine (PROZAC) 40 MG capsule Take 1 capsule by mouth daily 90 capsule 3    pseudoephedrine (SUDAFED 12 HR) 120 MG extended release tablet Take 120 mg by mouth every 12 hours      hydrOXYzine (ATARAX) 25 MG tablet Take 1 tablet by mouth nightly as needed (intersitial cystitis) 90 tablet 3    montelukast (SINGULAIR) 10 MG tablet take 1 tablet by mouth every evening 90 tablet 3    buPROPion (WELLBUTRIN XL) 150 MG extended release tablet Take 1 tablet by mouth every morning 90 tablet 3    triamcinolone (KENALOG) 0.025 % cream Apply Topically daily as needed for itching 80 g 0    aspirin-acetaminophen-caffeine (EXCEDRIN MIGRAINE) 250-250-65 MG per tablet Take 1 tablet by mouth as needed for Headaches      fluticasone (FLONASE) 50 MCG/ACT nasal spray by Nasal route      ibuprofen (ADVIL;MOTRIN) 800 MG tablet Take 1 tablet by mouth every 8 hours as needed for Pain 30 tablet 0     No current facility-administered medications for this visit. Allergies   Allergen Reactions    Codeine Other (See Comments)     Gives her a headache    Celebrex [Celecoxib] Rash     Whole body       Subjective:     Review of Systems   Constitutional: Positive for activity change and appetite change.  Negative

## 2019-10-08 ENCOUNTER — HOSPITAL ENCOUNTER (OUTPATIENT)
Dept: ULTRASOUND IMAGING | Age: 69
Discharge: HOME | End: 2019-10-10
Payer: MEDICARE

## 2019-10-08 DIAGNOSIS — R93.89 ENDOMETRIAL THICKENING ON ULTRASOUND: ICD-10-CM

## 2019-10-08 DIAGNOSIS — N95.0 POSTMENOPAUSAL BLEEDING: ICD-10-CM

## 2019-10-08 DIAGNOSIS — D25.1 INTRAMURAL LEIOMYOMA OF UTERUS: ICD-10-CM

## 2019-10-08 PROCEDURE — 76830 TRANSVAGINAL US NON-OB: CPT

## 2019-10-08 PROCEDURE — 76856 US EXAM PELVIC COMPLETE: CPT

## 2019-11-04 RX ORDER — BUPROPION HYDROCHLORIDE 150 MG/1
150 TABLET ORAL EVERY MORNING
Qty: 90 TABLET | Refills: 3 | Status: SHIPPED | OUTPATIENT
Start: 2019-11-04 | End: 2020-10-22 | Stop reason: SDUPTHER

## 2019-11-04 RX ORDER — PANTOPRAZOLE SODIUM 40 MG/1
TABLET, DELAYED RELEASE ORAL
Qty: 90 TABLET | Refills: 3 | OUTPATIENT
Start: 2019-11-04

## 2019-11-05 RX ORDER — PANTOPRAZOLE SODIUM 40 MG/1
40 TABLET, DELAYED RELEASE ORAL DAILY
Qty: 30 TABLET | Refills: 5 | Status: SHIPPED | OUTPATIENT
Start: 2019-11-05 | End: 2020-04-01

## 2019-11-12 ENCOUNTER — OFFICE VISIT (OUTPATIENT)
Dept: FAMILY MEDICINE CLINIC | Age: 69
End: 2019-11-12
Payer: MEDICARE

## 2019-11-12 VITALS
BODY MASS INDEX: 31.67 KG/M2 | DIASTOLIC BLOOD PRESSURE: 62 MMHG | SYSTOLIC BLOOD PRESSURE: 100 MMHG | WEIGHT: 209 LBS | HEART RATE: 61 BPM | HEIGHT: 68 IN | OXYGEN SATURATION: 98 %

## 2019-11-12 DIAGNOSIS — R73.01 IMPAIRED FASTING GLUCOSE: ICD-10-CM

## 2019-11-12 DIAGNOSIS — I48.92 ATRIAL FLUTTER, UNSPECIFIED TYPE (HCC): ICD-10-CM

## 2019-11-12 DIAGNOSIS — Z12.31 ENCOUNTER FOR SCREENING MAMMOGRAM FOR MALIGNANT NEOPLASM OF BREAST: ICD-10-CM

## 2019-11-12 DIAGNOSIS — E78.2 MIXED HYPERLIPIDEMIA: ICD-10-CM

## 2019-11-12 DIAGNOSIS — Z00.00 ROUTINE GENERAL MEDICAL EXAMINATION AT A HEALTH CARE FACILITY: Primary | ICD-10-CM

## 2019-11-12 DIAGNOSIS — Z12.39 BREAST CANCER SCREENING: ICD-10-CM

## 2019-11-12 PROBLEM — Z96.659 ARTIFICIAL KNEE JOINT PRESENT: Status: ACTIVE | Noted: 2019-11-12

## 2019-11-12 PROBLEM — M19.079 PRIMARY LOCALIZED OSTEOARTHROSIS OF ANKLE AND FOOT: Status: ACTIVE | Noted: 2019-11-12

## 2019-11-12 PROBLEM — Z47.1 AFTERCARE FOLLOWING JOINT REPLACEMENT SURGERY: Status: ACTIVE | Noted: 2019-11-12

## 2019-11-12 PROCEDURE — G8484 FLU IMMUNIZE NO ADMIN: HCPCS | Performed by: FAMILY MEDICINE

## 2019-11-12 PROCEDURE — 1123F ACP DISCUSS/DSCN MKR DOCD: CPT | Performed by: FAMILY MEDICINE

## 2019-11-12 PROCEDURE — 4040F PNEUMOC VAC/ADMIN/RCVD: CPT | Performed by: FAMILY MEDICINE

## 2019-11-12 PROCEDURE — 3017F COLORECTAL CA SCREEN DOC REV: CPT | Performed by: FAMILY MEDICINE

## 2019-11-12 PROCEDURE — G0439 PPPS, SUBSEQ VISIT: HCPCS | Performed by: FAMILY MEDICINE

## 2019-11-12 RX ORDER — HYDROXYZINE HYDROCHLORIDE 25 MG/1
25 TABLET, FILM COATED ORAL NIGHTLY PRN
Qty: 90 TABLET | Refills: 3 | Status: SHIPPED | OUTPATIENT
Start: 2019-11-12 | End: 2021-02-08 | Stop reason: SDUPTHER

## 2019-11-12 RX ORDER — MONTELUKAST SODIUM 10 MG/1
TABLET ORAL
Qty: 90 TABLET | Refills: 3 | Status: SHIPPED | OUTPATIENT
Start: 2019-11-12 | End: 2021-01-11

## 2019-11-12 ASSESSMENT — ENCOUNTER SYMPTOMS
BACK PAIN: 1
SHORTNESS OF BREATH: 0
DIARRHEA: 0
WHEEZING: 0
CONSTIPATION: 0
COUGH: 0
ABDOMINAL PAIN: 0
CHEST TIGHTNESS: 0

## 2019-11-12 ASSESSMENT — PATIENT HEALTH QUESTIONNAIRE - PHQ9
SUM OF ALL RESPONSES TO PHQ QUESTIONS 1-9: 1
SUM OF ALL RESPONSES TO PHQ QUESTIONS 1-9: 1

## 2019-11-12 ASSESSMENT — LIFESTYLE VARIABLES: HOW OFTEN DO YOU HAVE A DRINK CONTAINING ALCOHOL: 0

## 2019-12-24 ENCOUNTER — HOSPITAL ENCOUNTER (OUTPATIENT)
Dept: MAMMOGRAPHY | Age: 69
Discharge: HOME | End: 2019-12-26
Payer: MEDICARE

## 2019-12-24 DIAGNOSIS — Z12.31 ENCOUNTER FOR SCREENING MAMMOGRAM FOR MALIGNANT NEOPLASM OF BREAST: ICD-10-CM

## 2019-12-24 DIAGNOSIS — Z12.39 BREAST CANCER SCREENING: ICD-10-CM

## 2019-12-24 PROCEDURE — 77063 BREAST TOMOSYNTHESIS BI: CPT

## 2020-01-01 ENCOUNTER — HOSPITAL ENCOUNTER (EMERGENCY)
Age: 70
Discharge: HOME | End: 2020-01-01
Attending: EMERGENCY MEDICINE
Payer: MEDICARE

## 2020-01-01 VITALS
HEART RATE: 82 BPM | SYSTOLIC BLOOD PRESSURE: 153 MMHG | TEMPERATURE: 98.8 F | BODY MASS INDEX: 31.07 KG/M2 | WEIGHT: 205 LBS | RESPIRATION RATE: 16 BRPM | DIASTOLIC BLOOD PRESSURE: 96 MMHG | OXYGEN SATURATION: 95 % | HEIGHT: 68 IN

## 2020-01-01 PROCEDURE — 99283 EMERGENCY DEPT VISIT LOW MDM: CPT

## 2020-01-01 RX ORDER — DEXTROMETHORPHAN HYDROBROMIDE AND PROMETHAZINE HYDROCHLORIDE 15; 6.25 MG/5ML; MG/5ML
SYRUP ORAL
Qty: 300 ML | Refills: 1 | Status: SHIPPED | OUTPATIENT
Start: 2020-01-01 | End: 2020-05-12

## 2020-01-01 RX ORDER — PREDNISONE 20 MG/1
TABLET ORAL
Qty: 10 TABLET | Refills: 0 | Status: SHIPPED | OUTPATIENT
Start: 2020-01-01 | End: 2020-01-12 | Stop reason: ALTCHOICE

## 2020-01-01 RX ORDER — AMOXICILLIN AND CLAVULANATE POTASSIUM 875; 125 MG/1; MG/1
1 TABLET, FILM COATED ORAL 2 TIMES DAILY
Qty: 20 TABLET | Refills: 0 | Status: SHIPPED | OUTPATIENT
Start: 2020-01-01 | End: 2020-01-12 | Stop reason: ALTCHOICE

## 2020-01-01 RX ORDER — LEVOCETIRIZINE DIHYDROCHLORIDE 5 MG/1
5 TABLET, FILM COATED ORAL NIGHTLY
Qty: 20 TABLET | Refills: 0 | Status: SHIPPED | OUTPATIENT
Start: 2020-01-01 | End: 2020-09-15

## 2020-01-01 ASSESSMENT — PAIN DESCRIPTION - ORIENTATION: ORIENTATION: RIGHT;LEFT

## 2020-01-01 ASSESSMENT — PAIN DESCRIPTION - FREQUENCY: FREQUENCY: CONTINUOUS

## 2020-01-01 ASSESSMENT — PAIN DESCRIPTION - DESCRIPTORS: DESCRIPTORS: PRESSURE

## 2020-01-01 ASSESSMENT — PAIN DESCRIPTION - PROGRESSION: CLINICAL_PROGRESSION: GRADUALLY WORSENING

## 2020-01-01 ASSESSMENT — PAIN DESCRIPTION - PAIN TYPE: TYPE: ACUTE PAIN

## 2020-01-01 ASSESSMENT — PAIN SCALES - GENERAL
PAINLEVEL_OUTOF10: 3
PAINLEVEL_OUTOF10: 3

## 2020-01-01 ASSESSMENT — PAIN DESCRIPTION - LOCATION: LOCATION: FACE;EAR

## 2020-01-01 ASSESSMENT — PAIN DESCRIPTION - ONSET: ONSET: ON-GOING

## 2020-01-01 ASSESSMENT — PAIN - FUNCTIONAL ASSESSMENT: PAIN_FUNCTIONAL_ASSESSMENT: 0-10

## 2020-01-01 NOTE — ED PROVIDER NOTES
Curettage Hysteroscopy w/ polypectomy performed by Ben Cabezas MD at [address] [place] [address]      cataracts removed right eye    KNEE ARTHROSCOPY      left    KNEE SURGERY      total replacement-right    KNEE SURGERY Left 11/2014    SIGMOIDOSCOPY      SINUS SURGERY      TONSILLECTOMY      TUBAL LIGATION           CURRENT MEDICATIONS       Discharge Medication List as of 1/1/2020 12:07 PM      CONTINUE these medications which have NOT CHANGED    Details   hydrOXYzine (ATARAX) 25 MG tablet Take 1 tablet by mouth nightly as needed (intersitial cystitis), Disp-90 tablet, R-3Normal      montelukast (SINGULAIR) 10 MG tablet take 1 tablet by mouth every evening, Disp-90 tablet, R-3Normal      pantoprazole (PROTONIX) 40 MG tablet Take 1 tablet by mouth daily, Disp-30 tablet, R-5Normal      buPROPion (WELLBUTRIN XL) 150 MG extended release tablet take 1 tablet by mouth every morning, Disp-90 tablet, R-3Normal      butalbital-acetaminophen-caffeine (FIORICET, ESGIC) -40 MG per tablet Take 1 tablet by mouth every 6 hours as needed for Headaches or Migraine, Disp-120 tablet, R-1Normal      CALCIUM-MAGNESIUM-VITAMIN D PO Take by mouthHistorical Med      Multiple Vitamins-Minerals (THERAPEUTIC MULTIVITAMIN-MINERALS) tablet Take 1 tablet by mouth dailyHistorical Med      FLUoxetine (PROZAC) 40 MG capsule Take 1 capsule by mouth daily, Disp-90 capsule, R-3Normal      pseudoephedrine (SUDAFED 12 HR) 120 MG extended release tablet Take 120 mg by mouth every 12 hoursHistorical Med      triamcinolone (KENALOG) 0.025 % cream Apply Topically daily as needed for itching, Disp-80 g, R-0, Normal      aspirin-acetaminophen-caffeine (EXCEDRIN MIGRAINE) 250-250-65 MG per tablet Take 1 tablet by mouth as needed for Headaches      fluticasone (FLONASE) 50 MCG/ACT nasal spray by Nasal route      ibuprofen (ADVIL;MOTRIN) 800 MG tablet Take 1 tablet by mouth every 8 hours as needed for Pain, Disp-30 tablet, R-0             ALLERGIES labs were within normal range ornot returned as of this dictation. EMERGENCY DEPARTMENT COURSE and DIFFERENTIAL DIAGNOSIS/MDM:   Vitals:    Vitals:    01/01/20 1137 01/01/20 1209   BP: (!) 152/99 (!) 153/96   Pulse: 85 82   Resp: 16 16   Temp: 98.8 °F (37.1 °C)    SpO2: 93% 95%   Weight: 205 lb (93 kg)    Height: 5' 8\" (1.727 m)             Patient is placed on Augmentin for her ear infection. She is given a prescription for promethazine with DM for cough suppression instead of Mucinex DM. She is prescribed Xyzal and prednisone for her sinus symptoms and is advised to use steam inhalations twice a day. MDM    CONSULTS:  None    PROCEDURES:  Unlessotherwise noted below, none     Procedures    FINAL IMPRESSION      1. Right otitis media, unspecified otitis media type    2. Sinus congestion    3.  Viral URI with cough          DISPOSITION/PLAN   DISPOSITION Decision To Discharge 01/01/2020 12:04:16 PM      PATIENT REFERRED TO:  Jazzmine Mercer MD  [address] ED  [address] [place] [address]  [phone]    As needed, If symptoms worsen      DISCHARGE MEDICATIONS:         Problem List:  Patient Active Problem List   Diagnosis Code    Chronic cough R05    Chronic maxillary sinusitis J32.0    Chronic ethmoidal sinusitis J32.2    Chronic frontal sinusitis J32.1    Nasal vestibulitis J34.89    Epistaxis R04.0    Nasal septal deviation J34.2    Nasal turbinate hypertrophy J34.3    Chronic sphenoidal sinusitis J32.3    Sleep apnea G47.30    Fibromyalgia M79.7    GERD (gastroesophageal reflux disease) K21.9    Depression F32.9    Vitamin D deficiency E55.9    Intrinsic eczema L20.84    Postmenopausal bleeding N95.0    Intramural leiomyoma of uterus D25.1    Allergic rhinitis J30.9    Anxiety state F41.1    Atrial flutter (HCC) I48.92    Diverticulosis of colon K57.30    Hyperlipidemia E78.5    Myalgia and myositis OSJ2880    Arthritis of knee, degenerative M17.10    Osteoarthritis of left knee M17.12    Pancreatic cyst K86.2    Backache M54.9    Asthma J45.909    Aftercare following joint replacement surgery Z47.1    Artificial knee joint present Z96.659    Primary localized osteoarthrosis of ankle and foot M19.079           Summation      Patient Course: Discharged. ED Medicationsadministered this visit:  Medications - No data to display    New Prescriptions from this visit:    Discharge Medication List as of 1/1/2020 12:07 PM      START taking these medications    Details   amoxicillin-clavulanate (AUGMENTIN) 875-125 MG per tablet Take 1 tablet by mouth 2 times daily Take with food. , Disp-20 tablet, R-0Print      promethazine-dextromethorphan (PROMETHAZINE-DM) 6.25-15 MG/5ML syrup 1-2 tsp TID prn cough; will cause drowsiness. , Disp-300 mL, R-1Print      levocetirizine (XYZAL) 5 MG tablet Take 1 tablet by mouth nightly, Disp-20 tablet, R-0Print      predniSONE (DELTASONE) 20 MG tablet Take 2 tablets by mouth every morning till gone., Disp-10 tablet, R-0Print             Follow-up:  Farshad Parks MD  [address]  [address]  [phone]    As needed, If symptoms worsen        Final Impression:   1. Right otitis media, unspecified otitis media type    2. Sinus congestion    3.  Viral URI with cough               (Please note that portions of this note were completed with a voice recognitionprogram.  Efforts were made to edit the dictations but occasionally words are mis-transcribed.)    Albert Cummings MD (electronically signed)  Attending Emergency Physician            Albert Cummings MD  01/01/20 7566

## 2020-01-12 ENCOUNTER — OFFICE VISIT (OUTPATIENT)
Dept: PRIMARY CARE CLINIC | Age: 70
End: 2020-01-12
Payer: MEDICARE

## 2020-01-12 ENCOUNTER — HOSPITAL ENCOUNTER (OUTPATIENT)
Age: 70
Discharge: HOME | End: 2020-01-14
Payer: MEDICARE

## 2020-01-12 ENCOUNTER — HOSPITAL ENCOUNTER (OUTPATIENT)
Dept: GENERAL RADIOLOGY | Age: 70
Discharge: HOME | End: 2020-01-14
Payer: MEDICARE

## 2020-01-12 VITALS
DIASTOLIC BLOOD PRESSURE: 80 MMHG | SYSTOLIC BLOOD PRESSURE: 124 MMHG | TEMPERATURE: 97.8 F | WEIGHT: 201 LBS | HEART RATE: 72 BPM | BODY MASS INDEX: 30.56 KG/M2 | OXYGEN SATURATION: 94 %

## 2020-01-12 PROCEDURE — G8417 CALC BMI ABV UP PARAM F/U: HCPCS | Performed by: PHYSICIAN ASSISTANT

## 2020-01-12 PROCEDURE — G8427 DOCREV CUR MEDS BY ELIG CLIN: HCPCS | Performed by: PHYSICIAN ASSISTANT

## 2020-01-12 PROCEDURE — 4040F PNEUMOC VAC/ADMIN/RCVD: CPT | Performed by: PHYSICIAN ASSISTANT

## 2020-01-12 PROCEDURE — G8484 FLU IMMUNIZE NO ADMIN: HCPCS | Performed by: PHYSICIAN ASSISTANT

## 2020-01-12 PROCEDURE — 99214 OFFICE O/P EST MOD 30 MIN: CPT | Performed by: PHYSICIAN ASSISTANT

## 2020-01-12 PROCEDURE — 1090F PRES/ABSN URINE INCON ASSESS: CPT | Performed by: PHYSICIAN ASSISTANT

## 2020-01-12 PROCEDURE — 3017F COLORECTAL CA SCREEN DOC REV: CPT | Performed by: PHYSICIAN ASSISTANT

## 2020-01-12 PROCEDURE — 1036F TOBACCO NON-USER: CPT | Performed by: PHYSICIAN ASSISTANT

## 2020-01-12 PROCEDURE — 71046 X-RAY EXAM CHEST 2 VIEWS: CPT

## 2020-01-12 PROCEDURE — G8399 PT W/DXA RESULTS DOCUMENT: HCPCS | Performed by: PHYSICIAN ASSISTANT

## 2020-01-12 PROCEDURE — 1123F ACP DISCUSS/DSCN MKR DOCD: CPT | Performed by: PHYSICIAN ASSISTANT

## 2020-01-12 PROCEDURE — 99212 OFFICE O/P EST SF 10 MIN: CPT | Performed by: PHYSICIAN ASSISTANT

## 2020-01-12 RX ORDER — PREDNISONE 20 MG/1
20 TABLET ORAL DAILY
Qty: 5 TABLET | Refills: 0 | Status: SHIPPED | OUTPATIENT
Start: 2020-01-12 | End: 2020-01-17

## 2020-01-12 RX ORDER — ALBUTEROL SULFATE 2.5 MG/3ML
2.5 SOLUTION RESPIRATORY (INHALATION) EVERY 4 HOURS PRN
Qty: 25 EACH | Refills: 3 | Status: SHIPPED | OUTPATIENT
Start: 2020-01-12 | End: 2022-06-17 | Stop reason: SDUPTHER

## 2020-01-12 RX ORDER — LEVOFLOXACIN 500 MG/1
500 TABLET, FILM COATED ORAL DAILY
Qty: 10 TABLET | Refills: 0 | Status: SHIPPED | OUTPATIENT
Start: 2020-01-12 | End: 2020-01-22

## 2020-01-12 RX ORDER — BENZONATATE 200 MG/1
200 CAPSULE ORAL 3 TIMES DAILY PRN
Qty: 30 CAPSULE | Refills: 0 | Status: SHIPPED | OUTPATIENT
Start: 2020-01-12 | End: 2020-01-19

## 2020-01-12 ASSESSMENT — ENCOUNTER SYMPTOMS
SINUS PAIN: 1
SHORTNESS OF BREATH: 1
WHEEZING: 1
CHEST TIGHTNESS: 1
SORE THROAT: 1
RHINORRHEA: 1
COUGH: 1
TROUBLE SWALLOWING: 0
GASTROINTESTINAL NEGATIVE: 1
SINUS PRESSURE: 1

## 2020-01-12 NOTE — PROGRESS NOTES
Whole body       Current Outpatient Medications   Medication Sig Dispense Refill    promethazine-dextromethorphan (PROMETHAZINE-DM) 6.25-15 MG/5ML syrup 1-2 tsp TID prn cough; will cause drowsiness. 300 mL 1    levocetirizine (XYZAL) 5 MG tablet Take 1 tablet by mouth nightly 20 tablet 0    hydrOXYzine (ATARAX) 25 MG tablet Take 1 tablet by mouth nightly as needed (intersitial cystitis) 90 tablet 3    montelukast (SINGULAIR) 10 MG tablet take 1 tablet by mouth every evening 90 tablet 3    pantoprazole (PROTONIX) 40 MG tablet Take 1 tablet by mouth daily 30 tablet 5    buPROPion (WELLBUTRIN XL) 150 MG extended release tablet take 1 tablet by mouth every morning 90 tablet 3    butalbital-acetaminophen-caffeine (FIORICET, ESGIC) -40 MG per tablet Take 1 tablet by mouth every 6 hours as needed for Headaches or Migraine 120 tablet 1    CALCIUM-MAGNESIUM-VITAMIN D PO Take by mouth      Multiple Vitamins-Minerals (THERAPEUTIC MULTIVITAMIN-MINERALS) tablet Take 1 tablet by mouth daily      FLUoxetine (PROZAC) 40 MG capsule Take 1 capsule by mouth daily 90 capsule 3    pseudoephedrine (SUDAFED 12 HR) 120 MG extended release tablet Take 120 mg by mouth every 12 hours      triamcinolone (KENALOG) 0.025 % cream Apply Topically daily as needed for itching 80 g 0    aspirin-acetaminophen-caffeine (EXCEDRIN MIGRAINE) 250-250-65 MG per tablet Take 1 tablet by mouth as needed for Headaches      fluticasone (FLONASE) 50 MCG/ACT nasal spray by Nasal route      ibuprofen (ADVIL;MOTRIN) 800 MG tablet Take 1 tablet by mouth every 8 hours as needed for Pain 30 tablet 0     No current facility-administered medications for this visit. Review of Systems   Constitutional: Positive for activity change, appetite change, chills and fatigue. Negative for fever. HENT: Positive for congestion, ear pain, postnasal drip, rhinorrhea, sinus pressure, sinus pain and sore throat. Negative for sneezing and trouble swallowing. Sinus pain pressure is improving. Finished augmentin yesterday. The right ear hurts still. Can hear ok. No flu shot. Respiratory: Positive for cough, chest tightness, shortness of breath and wheezing. Cardiovascular: Positive for chest pain. Gastrointestinal: Negative. Genitourinary: Negative. Musculoskeletal: Negative for myalgias. Neurological: Positive for light-headedness and headaches. Negative for dizziness. Psychiatric/Behavioral: Positive for sleep disturbance. Objective:      /80 (Site: Left Upper Arm, Position: Sitting, Cuff Size: Large Adult)   Pulse 72   Temp 97.8 °F (36.6 °C) (Tympanic)   Wt 201 lb (91.2 kg)   SpO2 94%   BMI 30.56 kg/m²     Physical Exam  Vitals signs and nursing note reviewed. Constitutional:       General: She is not in acute distress. Appearance: Normal appearance. She is well-developed. She is ill-appearing. HENT:      Head: Normocephalic and atraumatic. Comments: TM's are dull. Postnasal drip noted. Right Ear: External ear normal.      Left Ear: External ear normal.      Nose: Nose normal. No rhinorrhea. Mouth/Throat:      Mouth: Mucous membranes are moist.      Pharynx: No oropharyngeal exudate or posterior oropharyngeal erythema. Eyes:      General: No scleral icterus. Conjunctiva/sclera: Conjunctivae normal.   Neck:      Musculoskeletal: Normal range of motion and neck supple. No neck rigidity or muscular tenderness. Cardiovascular:      Rate and Rhythm: Normal rate and regular rhythm. Heart sounds: Normal heart sounds. No murmur. No gallop. Pulmonary:      Effort: Pulmonary effort is normal. No respiratory distress. Breath sounds: Rhonchi present. No wheezing or rales. Comments: Scattered rhonchi noted throughout the lung fields did not clear with cough. Abdominal:      General: There is no distension. Palpations: Abdomen is soft. There is no mass. Tenderness:  There is PM    (Pleasenote that portions of this note were completed with a voice recognition program.Efforts were made to edit the dictations but occasionally words are mis-transcribed.)

## 2020-03-12 ENCOUNTER — TELEPHONE (OUTPATIENT)
Dept: FAMILY MEDICINE CLINIC | Age: 70
End: 2020-03-12

## 2020-03-12 RX ORDER — PREDNISONE 20 MG/1
20 TABLET ORAL DAILY
Qty: 5 TABLET | Refills: 0 | Status: SHIPPED | OUTPATIENT
Start: 2020-03-12 | End: 2020-03-17

## 2020-03-12 NOTE — TELEPHONE ENCOUNTER
Spoke to Jaswant and let her Know Dr Wayne Davis said:Its probably viral, possibly the flu based on how fast it came on. I sent in a low dose of prednisone for her to help with her ear pain and pressure.  I would recommend she continue the mucinex and try some flonase 1 spray each side bid  And she sent it to kristina Candelario

## 2020-03-12 NOTE — TELEPHONE ENCOUNTER
Pt calling stating she has sinus pressure, sore throat, and R ear hurts, does not want to come in, doesn't have transportation, pt uses [facility], please advise at above number.

## 2020-03-12 NOTE — TELEPHONE ENCOUNTER
Its probably viral, possibly the flu based on how fast it came on. I sent in a low dose of prednisone for her to help with her ear pain and pressure. I would recommend she continue the mucinex and try some flonase 1 spray each side bid.

## 2020-03-20 ENCOUNTER — TELEPHONE (OUTPATIENT)
Dept: FAMILY MEDICINE CLINIC | Age: 70
End: 2020-03-20

## 2020-03-20 RX ORDER — AMOXICILLIN 500 MG/1
500 CAPSULE ORAL 3 TIMES DAILY
Qty: 30 CAPSULE | Refills: 0 | Status: SHIPPED | OUTPATIENT
Start: 2020-03-20 | End: 2020-05-12

## 2020-04-01 RX ORDER — PANTOPRAZOLE SODIUM 40 MG/1
TABLET, DELAYED RELEASE ORAL
Qty: 30 TABLET | Refills: 5 | Status: SHIPPED | OUTPATIENT
Start: 2020-04-01 | End: 2020-09-15 | Stop reason: SDUPTHER

## 2020-04-27 RX ORDER — FLUOXETINE HYDROCHLORIDE 40 MG/1
CAPSULE ORAL
Qty: 90 CAPSULE | Refills: 3 | Status: SHIPPED | OUTPATIENT
Start: 2020-04-27 | End: 2021-07-26

## 2020-04-27 RX ORDER — PANTOPRAZOLE SODIUM 40 MG/1
TABLET, DELAYED RELEASE ORAL
Qty: 30 TABLET | Refills: 5 | OUTPATIENT
Start: 2020-04-27

## 2020-04-27 NOTE — TELEPHONE ENCOUNTER
Last Appt:  11/12/2019  Next Appt:   5/12/2020  [facility] [address] 4/27/2020

## 2020-05-12 ENCOUNTER — VIRTUAL VISIT (OUTPATIENT)
Dept: FAMILY MEDICINE CLINIC | Age: 70
End: 2020-05-12
Payer: MEDICARE

## 2020-05-12 VITALS — HEIGHT: 68 IN | BODY MASS INDEX: 31.37 KG/M2 | WEIGHT: 207 LBS

## 2020-05-12 PROCEDURE — 99214 OFFICE O/P EST MOD 30 MIN: CPT | Performed by: FAMILY MEDICINE

## 2020-05-12 PROCEDURE — G8427 DOCREV CUR MEDS BY ELIG CLIN: HCPCS | Performed by: FAMILY MEDICINE

## 2020-05-12 PROCEDURE — 1090F PRES/ABSN URINE INCON ASSESS: CPT | Performed by: FAMILY MEDICINE

## 2020-05-12 PROCEDURE — G8399 PT W/DXA RESULTS DOCUMENT: HCPCS | Performed by: FAMILY MEDICINE

## 2020-05-12 PROCEDURE — 4040F PNEUMOC VAC/ADMIN/RCVD: CPT | Performed by: FAMILY MEDICINE

## 2020-05-12 PROCEDURE — 3017F COLORECTAL CA SCREEN DOC REV: CPT | Performed by: FAMILY MEDICINE

## 2020-05-12 PROCEDURE — 1123F ACP DISCUSS/DSCN MKR DOCD: CPT | Performed by: FAMILY MEDICINE

## 2020-05-12 RX ORDER — PROPRANOLOL HCL 60 MG
60 CAPSULE, EXTENDED RELEASE 24HR ORAL DAILY
Qty: 30 CAPSULE | Refills: 2 | Status: SHIPPED | OUTPATIENT
Start: 2020-05-12 | End: 2020-09-15

## 2020-05-12 RX ORDER — VIT C/E/ZN/COPPR/LUTEIN/ZEAXAN 250MG-90MG
1 CAPSULE ORAL 2 TIMES DAILY
COMMUNITY

## 2020-05-12 ASSESSMENT — PATIENT HEALTH QUESTIONNAIRE - PHQ9
2. FEELING DOWN, DEPRESSED OR HOPELESS: 0
SUM OF ALL RESPONSES TO PHQ QUESTIONS 1-9: 0
SUM OF ALL RESPONSES TO PHQ9 QUESTIONS 1 & 2: 0
1. LITTLE INTEREST OR PLEASURE IN DOING THINGS: 0
SUM OF ALL RESPONSES TO PHQ QUESTIONS 1-9: 0

## 2020-05-12 ASSESSMENT — ENCOUNTER SYMPTOMS
SINUS PRESSURE: 1
VISUAL CHANGE: 1
BLURRED VISION: 1
WHEEZING: 0
PHOTOPHOBIA: 1
SHORTNESS OF BREATH: 0
COUGH: 0
CHEST TIGHTNESS: 0

## 2020-05-12 NOTE — PROGRESS NOTES
(intermittent). Prior to Visit Medications    Medication Sig Taking?  Authorizing Provider   NONFORMULARY Take 1 capsule by mouth daily Cholesterol 360 Yes Historical Provider, MD   Multiple Vitamins-Minerals (PRESERVISION AREDS 2) CHEW Take 1 each by mouth 2 times daily Yes Historical Provider, MD   VITAMIN E PO Take 1 capsule by mouth daily Yes Historical Provider, MD   Ascorbic Acid (VITAMIN C PO) Take 1 tablet by mouth daily Yes Historical Provider, MD   Cholecalciferol (VITAMIN D3 PO) Take 1 tablet by mouth daily Yes Historical Provider, MD   BIOTIN PO Take 1 tablet by mouth daily Yes Historical Provider, MD   propranolol (INDERAL LA) 60 MG extended release capsule Take 1 capsule by mouth daily Yes Garrison Mcbride MD   FLUoxetine (PROZAC) 40 MG capsule take 1 capsule by mouth once daily Yes Garrison Mcbride MD   pantoprazole (PROTONIX) 40 MG tablet take 1 tablet by mouth once daily Yes Garrison Mcbride MD   Nebulizers (COMPRESSOR/NEBULIZER) MISC Use as directed Yes ROSE MARIE Gallardo   levocetirizine (XYZAL) 5 MG tablet Take 1 tablet by mouth nightly Yes Ghazal Gutierrez MD   hydrOXYzine (ATARAX) 25 MG tablet Take 1 tablet by mouth nightly as needed (intersitial cystitis) Yes Garrison Mcbride MD   montelukast (SINGULAIR) 10 MG tablet take 1 tablet by mouth every evening Yes Garrison Mcbride MD   buPROPion (WELLBUTRIN XL) 150 MG extended release tablet take 1 tablet by mouth every morning Yes Garrison Mcbride MD   butalbital-acetaminophen-caffeine (FIORICET, ESGIC) -42 MG per tablet Take 1 tablet by mouth every 6 hours as needed for Headaches or Migraine Yes Garrison Mcbride MD   CALCIUM-MAGNESIUM-VITAMIN D PO Take by mouth Yes Historical Provider, MD   aspirin-acetaminophen-caffeine (EXCEDRIN MIGRAINE) 007-119-86 MG per tablet Take 1 tablet by mouth as needed for Headaches Yes Historical Provider, MD   fluticasone (FLONASE) 50 MCG/ACT nasal spray by Nasal route Yes Historical Provider, MD   ibuprofen [x] Appears well-developed and well-nourished [x] No apparent distress      [] Abnormal-   Mental status  [x] Alert and awake  [x] Oriented to person/place/time [x]Able to follow commands      Eyes:  EOM    [x]  Normal  [] Abnormal-  Sclera  [x]  Normal  [] Abnormal -         Discharge [x]  None visible  [] Abnormal -    HENT:   [x] Normocephalic, atraumatic. [x] Abnormal   [] Mouth/Throat: Mucous membranes are moist.     External Ears [x] Normal  [] Abnormal-     Neck: [x] No visualized mass     Pulmonary/Chest: [x] Respiratory effort normal.  [x] No visualized signs of difficulty breathing or respiratory distress        [] Abnormal-      Musculoskeletal:          [x] Normal range of motion of neck        [] Abnormal-       Neurological:        [x] No Facial Asymmetry (Cranial nerve 7 motor function) (limited exam to video visit)          [] Abnormal-         Skin:        [x] No significant exanthematous lesions or discoloration noted on facial skin         [] Abnormal-            Psychiatric:       [x] Normal Affect [x] No Hallucinations        [] Abnormal-     Other pertinent observable physical exam findings-     ASSESSMENT/PLAN:  1. Chronic migraine without aura with status migrainosus, not intractable  Worsening; she is having a lot of problems with headaches recently. I started her on propranolol to try to help with her recurrent headaches. I also told her she can continue her abortive therapies as needed. Return in about 1 month (around 6/12/2020) for headache. Cooper Kurtz is a 78 y.o. female being evaluated by a Virtual Visit (video visit) encounter to address concerns as mentioned above. A caregiver was present when appropriate. Due to this being a TeleHealth encounter (During FCZWV-44 public health emergency), evaluation of the following organ systems was limited: Vitals/Constitutional/EENT/Resp/CV/GI//MS/Neuro/Skin/Heme-Lymph-Imm.   Pursuant to the emergency declaration under the

## 2020-07-20 RX ORDER — FLUOXETINE HYDROCHLORIDE 40 MG/1
CAPSULE ORAL
Qty: 90 CAPSULE | Refills: 3 | OUTPATIENT
Start: 2020-07-20

## 2020-09-15 ENCOUNTER — OFFICE VISIT (OUTPATIENT)
Dept: FAMILY MEDICINE CLINIC | Age: 70
End: 2020-09-15
Payer: MEDICARE

## 2020-09-15 VITALS
RESPIRATION RATE: 18 BRPM | DIASTOLIC BLOOD PRESSURE: 78 MMHG | TEMPERATURE: 97.5 F | SYSTOLIC BLOOD PRESSURE: 118 MMHG | HEART RATE: 67 BPM | OXYGEN SATURATION: 95 % | HEIGHT: 68 IN | WEIGHT: 214 LBS | BODY MASS INDEX: 32.43 KG/M2

## 2020-09-15 PROCEDURE — G8427 DOCREV CUR MEDS BY ELIG CLIN: HCPCS | Performed by: FAMILY MEDICINE

## 2020-09-15 PROCEDURE — 1090F PRES/ABSN URINE INCON ASSESS: CPT | Performed by: FAMILY MEDICINE

## 2020-09-15 PROCEDURE — 3017F COLORECTAL CA SCREEN DOC REV: CPT | Performed by: FAMILY MEDICINE

## 2020-09-15 PROCEDURE — 99211 OFF/OP EST MAY X REQ PHY/QHP: CPT

## 2020-09-15 PROCEDURE — G8417 CALC BMI ABV UP PARAM F/U: HCPCS | Performed by: FAMILY MEDICINE

## 2020-09-15 PROCEDURE — 1036F TOBACCO NON-USER: CPT | Performed by: FAMILY MEDICINE

## 2020-09-15 PROCEDURE — 4040F PNEUMOC VAC/ADMIN/RCVD: CPT | Performed by: FAMILY MEDICINE

## 2020-09-15 PROCEDURE — G8399 PT W/DXA RESULTS DOCUMENT: HCPCS | Performed by: FAMILY MEDICINE

## 2020-09-15 PROCEDURE — 1123F ACP DISCUSS/DSCN MKR DOCD: CPT | Performed by: FAMILY MEDICINE

## 2020-09-15 PROCEDURE — 99214 OFFICE O/P EST MOD 30 MIN: CPT | Performed by: FAMILY MEDICINE

## 2020-09-15 RX ORDER — PROPRANOLOL HCL 60 MG
120 CAPSULE, EXTENDED RELEASE 24HR ORAL DAILY
Qty: 60 CAPSULE | Refills: 1
Start: 2020-09-15 | End: 2020-11-10

## 2020-09-15 RX ORDER — TRIAMCINOLONE ACETONIDE 0.25 MG/G
CREAM TOPICAL
Qty: 80 G | Refills: 1 | Status: SHIPPED | OUTPATIENT
Start: 2020-09-15 | End: 2022-09-06

## 2020-09-15 RX ORDER — PREDNISONE 20 MG/1
20 TABLET ORAL DAILY
Qty: 5 TABLET | Refills: 0 | Status: SHIPPED | OUTPATIENT
Start: 2020-09-15 | End: 2020-09-20

## 2020-09-15 RX ORDER — PANTOPRAZOLE SODIUM 40 MG/1
TABLET, DELAYED RELEASE ORAL
Qty: 30 TABLET | Refills: 5 | Status: SHIPPED | OUTPATIENT
Start: 2020-09-15 | End: 2021-06-14

## 2020-09-15 ASSESSMENT — ENCOUNTER SYMPTOMS
CHEST TIGHTNESS: 0
WHEEZING: 0
RHINORRHEA: 1
COUGH: 0
SHORTNESS OF BREATH: 0
SORE THROAT: 0

## 2020-09-15 ASSESSMENT — PATIENT HEALTH QUESTIONNAIRE - PHQ9
1. LITTLE INTEREST OR PLEASURE IN DOING THINGS: 0
2. FEELING DOWN, DEPRESSED OR HOPELESS: 2
SUM OF ALL RESPONSES TO PHQ QUESTIONS 1-9: 2
SUM OF ALL RESPONSES TO PHQ9 QUESTIONS 1 & 2: 2
SUM OF ALL RESPONSES TO PHQ QUESTIONS 1-9: 2

## 2020-09-15 NOTE — PROGRESS NOTES
[facility] [address]  [address] [address]  Dept: [phone]  Dept Fax: [phone]  Loc: [phone]    Alban Mcgregor is a 69 y.o. female who presents today for her medical conditions/complaints as notedbelow. Alban Mcgregor is c/o of   Chief Complaint   Patient presents with    Otalgia     2 weeks for right, few days for left of and on, fullness in right ear    Other     stated has some swollen lymphnods     Migraine     still having       HPI:     Here today for ear pain and headaches. Otalgia    There is pain in both ears. This is a new problem. The current episode started 1 to 4 weeks ago (2 weeks). The problem occurs constantly. The problem has been gradually worsening. The pain is at a severity of 3/10. The pain is mild. Associated symptoms include headaches, hearing loss and rhinorrhea. Pertinent negatives include no coughing, ear discharge, rash or sore throat. She has tried ear drops (frankensense) for the symptoms. The treatment provided no relief. Headaches: worsening;     Past Medical History:   Diagnosis Date    Allergic rhinitis     Anxiety     Asthma     Bronchitis     Chest pain     Chronic back pain     Depression     Fibromyalgia     GERD (gastroesophageal reflux disease)     Headache(784.0)     Heart palpitations     Irregular bowel habits     Macular degeneration     beginning    Measles     Osteoarthritis     Restless legs syndrome     Tachycardia     Unspecified sleep apnea     Wet senile macular degeneration (HCC)     bilateral- Sees a retinal Dr Ketan Galicia          Social History     Tobacco Use    Smoking status: Former Smoker     Packs/day: 1.00     Types: Cigarettes     Last attempt to quit: 1993     Years since quittin.8    Smokeless tobacco: Never Used   Substance Use Topics    Alcohol use:  Yes     Alcohol/week: 2.0 standard drinks Types: 2 Glasses of wine per week     Current Outpatient Medications   Medication Sig Dispense Refill    triamcinolone (KENALOG) 0.025 % cream Apply Topically daily as needed for itching 80 g 1    pantoprazole (PROTONIX) 40 MG tablet take 1 tablet by mouth once daily 30 tablet 5    propranolol (INDERAL LA) 60 MG extended release capsule Take 2 capsules by mouth daily 60 capsule 1    predniSONE (DELTASONE) 20 MG tablet Take 1 tablet by mouth daily for 5 days 5 tablet 0    NONFORMULARY Take 1 capsule by mouth daily Cholesterol 360      Multiple Vitamins-Minerals (PRESERVISION AREDS 2) CHEW Take 1 each by mouth 2 times daily      VITAMIN E PO Take 1 capsule by mouth daily      Ascorbic Acid (VITAMIN C PO) Take 1 tablet by mouth daily      Cholecalciferol (VITAMIN D3 PO) Take 1 tablet by mouth daily      BIOTIN PO Take 1 tablet by mouth daily      FLUoxetine (PROZAC) 40 MG capsule take 1 capsule by mouth once daily 90 capsule 3    albuterol (PROVENTIL) (2.5 MG/3ML) 0.083% nebulizer solution Take 3 mLs by nebulization every 4 hours as needed for Wheezing or Shortness of Breath 25 each 3    Nebulizers (COMPRESSOR/NEBULIZER) MISC Use as directed 1 each 3    hydrOXYzine (ATARAX) 25 MG tablet Take 1 tablet by mouth nightly as needed (intersitial cystitis) 90 tablet 3    montelukast (SINGULAIR) 10 MG tablet take 1 tablet by mouth every evening 90 tablet 3    buPROPion (WELLBUTRIN XL) 150 MG extended release tablet take 1 tablet by mouth every morning 90 tablet 3    CALCIUM-MAGNESIUM-VITAMIN D PO Take by mouth      aspirin-acetaminophen-caffeine (EXCEDRIN MIGRAINE) 250-250-65 MG per tablet Take 1 tablet by mouth as needed for Headaches      fluticasone (FLONASE) 50 MCG/ACT nasal spray by Nasal route      ibuprofen (ADVIL;MOTRIN) 800 MG tablet Take 1 tablet by mouth every 8 hours as needed for Pain 30 tablet 0    pseudoephedrine (SUDAFED 12 HR) 120 MG extended release tablet Take 120 mg by mouth every 12 hours       No current facility-administered medications for this visit. Allergies   Allergen Reactions    Codeine Other (See Comments)     Gives her a headache    Celebrex [Celecoxib] Rash     Whole body       Subjective:     Review of Systems   Constitutional: Negative for activity change, appetite change, chills, fatigue and fever. HENT: Positive for ear pain, hearing loss and rhinorrhea. Negative for ear discharge and sore throat. Eyes: Negative for visual disturbance. Respiratory: Negative for cough, chest tightness, shortness of breath and wheezing. Cardiovascular: Negative for chest pain, palpitations and leg swelling. Genitourinary: Negative for difficulty urinating. Skin: Negative for rash. Neurological: Positive for headaches. Negative for dizziness, syncope, weakness and light-headedness. Objective:      Physical Exam  Vitals signs and nursing note reviewed. Constitutional:       General: She is not in acute distress. Appearance: She is well-developed. HENT:      Right Ear: Tympanic membrane, ear canal and external ear normal.      Left Ear: Tympanic membrane, ear canal and external ear normal.      Nose: Mucosal edema present. Right Sinus: No maxillary sinus tenderness or frontal sinus tenderness. Left Sinus: No maxillary sinus tenderness or frontal sinus tenderness. Mouth/Throat:      Pharynx: No oropharyngeal exudate. Eyes:      Conjunctiva/sclera: Conjunctivae normal.   Neck:      Musculoskeletal: Normal range of motion and neck supple. Cardiovascular:      Rate and Rhythm: Normal rate and regular rhythm. Heart sounds: Normal heart sounds. No murmur. Pulmonary:      Effort: Pulmonary effort is normal. No respiratory distress. Breath sounds: Normal breath sounds. No wheezing or rales. Lymphadenopathy:      Cervical: No cervical adenopathy. Skin:     General: Skin is warm and dry. Findings: No rash.    Neurological: Mental Status: She is alert and oriented to person, place, and time. Psychiatric:         Behavior: Behavior normal.         Judgment: Judgment normal.       /78 (Site: Left Upper Arm, Position: Sitting, Cuff Size: Large Adult)   Pulse 67   Temp 97.5 °F (36.4 °C)   Resp 18   Ht 5' 8\" (1.727 m)   Wt 214 lb (97.1 kg)   SpO2 95%   BMI 32.54 kg/m²     Assessment:       Diagnosis Orders   1. Dysfunction of both eustachian tubes     2. Chronic migraine without aura with status migrainosus, not intractable               Plan:        Eustachian tube dysfunction: new; I will treat with a low dose of prednisone to try to help with the pain. It is likely due to allergies and the high pollen count. Migraines: worsening; she did not get any relief from the propranolol so she is going to try taking 2 to see if that helps. She will monitor for hypotension and call if symptoms don't improve. Return in about 2 months (around 11/15/2020) for [address] [facility] [address] Orders Placed This Encounter   Medications    triamcinolone (KENALOG) 0.025 % cream     Sig: Apply Topically daily as needed for itching     Dispense:  80 g     Refill:  1    pantoprazole (PROTONIX) 40 MG tablet     Sig: take 1 tablet by mouth once daily     Dispense:  30 tablet     Refill:  5    propranolol (INDERAL LA) 60 MG extended release capsule     Sig: Take 2 capsules by mouth daily     Dispense:  60 capsule     Refill:  1    predniSONE (DELTASONE) 20 MG tablet     Sig: Take 1 tablet by mouth daily for 5 days     Dispense:  5 tablet     Refill:  0       Patientgiven educational materials - see patient instructions. Discussed use, benefit,and side effects of prescribed medications. All patient questions answered. Ptvoiced understanding. Reviewed health maintenance. Instructed to continue currentmedications, diet and exercise. Patient agreed with treatment plan. Follow up asdirected.      Electronically signed by Emanuel Woo MD on 9/15/2020 at 4:47

## 2020-09-17 ENCOUNTER — TELEPHONE (OUTPATIENT)
Dept: FAMILY MEDICINE CLINIC | Age: 70
End: 2020-09-17

## 2020-09-17 NOTE — TELEPHONE ENCOUNTER
Patient had an office visit on Tuesday, 09/15 and it was discussed an increase in her Pantoprazole when she ran out of medication and she is out please send in a new RX

## 2020-10-02 ENCOUNTER — TELEPHONE (OUTPATIENT)
Dept: FAMILY MEDICINE CLINIC | Age: 70
End: 2020-10-02

## 2020-10-02 NOTE — TELEPHONE ENCOUNTER
Patient wants to know about [facility] opinion on a procedure that could help with her spinal stenosis and bowel incontinence. Patient can not remember name of procedure but would like to talk to [address] [place] [address] about it. Will schedule a VV if needed.

## 2020-10-22 ENCOUNTER — VIRTUAL VISIT (OUTPATIENT)
Dept: FAMILY MEDICINE CLINIC | Age: 70
End: 2020-10-22
Payer: MEDICARE

## 2020-10-22 PROBLEM — F33.0 MILD EPISODE OF RECURRENT MAJOR DEPRESSIVE DISORDER (HCC): Status: ACTIVE | Noted: 2020-10-22

## 2020-10-22 PROCEDURE — 99443 PR PHYS/QHP TELEPHONE EVALUATION 21-30 MIN: CPT | Performed by: FAMILY MEDICINE

## 2020-10-22 PROCEDURE — 99211 OFF/OP EST MAY X REQ PHY/QHP: CPT

## 2020-10-22 RX ORDER — CHOLESTYRAMINE 4 G/9G
1 POWDER, FOR SUSPENSION ORAL DAILY
Qty: 30 PACKET | Refills: 1 | Status: SHIPPED | OUTPATIENT
Start: 2020-10-22 | End: 2020-12-22 | Stop reason: SDUPTHER

## 2020-10-22 RX ORDER — BUPROPION HYDROCHLORIDE 150 MG/1
150 TABLET ORAL EVERY MORNING
Qty: 90 TABLET | Refills: 3 | Status: SHIPPED | OUTPATIENT
Start: 2020-10-22 | End: 2021-09-15 | Stop reason: SDUPTHER

## 2020-10-22 ASSESSMENT — ENCOUNTER SYMPTOMS
BACK PAIN: 1
COUGH: 0
DIARRHEA: 1
BLOATING: 0
SHORTNESS OF BREATH: 0
FLATUS: 0
WHEEZING: 0
BOWEL INCONTINENCE: 1
ABDOMINAL PAIN: 0
CHEST TIGHTNESS: 0

## 2020-10-22 NOTE — PROGRESS NOTES
10/22/2020    TELEHEALTH EVALUATION -- Audio/Visual (During [facility]-63 public health emergency)    HPI: Seen today via phone while she was at home and I was at work. Victor Manuel Zamora (:  1950) has requested an audio/video evaluation for the following concern(s):    Back Pain   This is a chronic problem. The current episode started more than 1 year ago. The problem occurs constantly. The problem has been gradually worsening since onset. The pain is present in the lumbar spine (cervical spine). The quality of the pain is described as aching and stabbing. The pain does not radiate. The pain is at a severity of 6/10. The pain is moderate. The symptoms are aggravated by standing. Associated symptoms include bladder incontinence (new), bowel incontinence (especially if she has diarrhea), dysuria (mild) and weakness. Pertinent negatives include no abdominal pain, chest pain, fever, headaches (improving), leg pain, numbness, paresis, paresthesias, pelvic pain, perianal numbness, tingling or weight loss. (Left feel like they are going to give out but they haven't. Her equilibrium is way off. Vision is getting worse. ) Risk factors include menopause. She has tried chiropractic manipulation, muscle relaxant, NSAIDs, home exercises and ice for the symptoms. The treatment provided mild relief. Diarrhea    This is a recurrent problem. The current episode started more than 1 year ago. The problem occurs 2 to 4 times per day. The problem has been unchanged. The stool consistency is described as watery. The patient states that diarrhea does not awaken her from sleep. Pertinent negatives include no abdominal pain, bloating, chills, coughing, fever, headaches (improving), increased  flatus or weight loss. The symptoms are aggravated by stress. There are no known risk factors. She has tried anti-motility drug for the symptoms. The treatment provided mild relief.  Her past medical history is significant for irritable bowel syndrome. she has chronic IBS-D and now that her back has been bothering her so much she is having frequent episodes of bowel incontinence which is very bothersome to her. She is wearing depends because she can't hold it. She last had an MRI of her back about 4 years ago. Headaches: improving; she is doing much better since I increased the propranolol. She feels like her headaches are few and far between now and she is very happy with the progress. Review of Systems   Constitutional: Negative for activity change, appetite change, chills, fatigue, fever and weight loss. Eyes: Negative for visual disturbance. Respiratory: Negative for cough, chest tightness, shortness of breath and wheezing. Cardiovascular: Negative for chest pain, palpitations and leg swelling. Gastrointestinal: Positive for bowel incontinence (especially if she has diarrhea) and diarrhea. Negative for abdominal pain, bloating and flatus. Some bowel incontinence. Genitourinary: Positive for bladder incontinence (new) and dysuria (mild). Negative for difficulty urinating and pelvic pain. Musculoskeletal: Positive for back pain. Neurological: Positive for weakness. Negative for dizziness, tingling, syncope, light-headedness, numbness, headaches (improving) and paresthesias. Prior to Visit Medications    Medication Sig Taking?  Authorizing Provider   buPROPion (WELLBUTRIN XL) 150 MG extended release tablet Take 1 tablet by mouth every morning Yes Lulla Lesch, MD   cholestyramine Jazmine Radar) 4 g packet Take 1 packet by mouth daily Yes Lulla Lesch, MD   triamcinolone (KENALOG) 0.025 % cream Apply Topically daily as needed for itching Yes Lulla Lesch, MD   pantoprazole (PROTONIX) 40 MG tablet take 1 tablet by mouth once daily Yes Lulla Lesch, MD   propranolol (INDERAL LA) 60 MG extended release capsule Take 2 capsules by mouth daily Yes Lulla Lesch, MD   NONFORMULARY Take 1 capsule by mouth daily Cholesterol 360 Yes Historical Provider, MD   Multiple Vitamins-Minerals (PRESERVISION AREDS 2) CHEW Take 1 each by mouth 2 times daily Yes Historical Provider, MD   VITAMIN E PO Take 1 capsule by mouth daily Yes Historical Provider, MD   Ascorbic Acid (VITAMIN C PO) Take 1 tablet by mouth daily Yes Historical Provider, MD   Cholecalciferol (VITAMIN D3 PO) Take 1 tablet by mouth daily Yes Historical Provider, MD   BIOTIN PO Take 1 tablet by mouth daily Yes Historical Provider, MD   FLUoxetine (PROZAC) 40 MG capsule take 1 capsule by mouth once daily Yes Emely Murray MD   albuterol (PROVENTIL) (2.5 MG/3ML) 0.083% nebulizer solution Take 3 mLs by nebulization every 4 hours as needed for Wheezing or Shortness of Breath Yes ROSE MARIE Cadet   Nebulizers (COMPRESSOR/NEBULIZER) MISC Use as directed Yes ROSE MARIE Cadet   hydrOXYzine (ATARAX) 25 MG tablet Take 1 tablet by mouth nightly as needed (intersitial cystitis) Yes Emely Murray MD   montelukast (SINGULAIR) 10 MG tablet take 1 tablet by mouth every evening Yes Emely Murray MD   CALCIUM-MAGNESIUM-VITAMIN D PO Take by mouth Yes Historical Provider, MD   pseudoephedrine (SUDAFED 12 HR) 120 MG extended release tablet Take 120 mg by mouth every 12 hours Yes Historical Provider, MD   aspirin-acetaminophen-caffeine (Loc Pen) 195-257-64 MG per tablet Take 1 tablet by mouth as needed for Headaches Yes Historical Provider, MD   fluticasone (FLONASE) 50 MCG/ACT nasal spray by Nasal route Yes Historical Provider, MD   ibuprofen (ADVIL;MOTRIN) 800 MG tablet Take 1 tablet by mouth every 8 hours as needed for Pain Yes Mekhi Francis MD       Social History     Tobacco Use    Smoking status: Former Smoker     Packs/day: 1.00     Types: Cigarettes     Last attempt to quit: 1993     Years since quittin.9    Smokeless tobacco: Never Used   Substance Use Topics    Alcohol use:  Yes     Alcohol/week: 2.0 standard drinks     Types: 2 ([address]  Stable; sees cardiology    6. Mild intermittent asthma without complication  Stable; no recent exacerbations. Return if symptoms worsen or fail to improve, for keep appointment as scheduled. Sandy Campo is a 69 y.o. female being evaluated by a phone encounter to address concerns as mentioned above. A caregiver was present when appropriate. Due to this being a TeleHealth encounter (During [facility] public [facility] emergency), evaluation of the following organ systems was limited: Vitals/Constitutional/EENT/Resp/CV/GI//MS/Neuro/Skin/Heme-Lymph-Imm. Pursuant to the emergency declaration under the [address] [place] [address], [address] [place] [address] authority and the [employer] and [employer] Act, this Virtual Visit was conducted with patient's (and/or legal guardian's) consent, to reduce the patient's risk of exposure to COVID-19 and provide necessary medical care. The patient (and/or legal guardian) has also been advised to contact this office for worsening conditions or problems, and seek emergency medical treatment and/or call 911 if deemed necessary. Patient identification was verified at the start of the visit: Yes    Total time spent on this encounter: 25 minutes    Services were provided through a phone discussion to substitute for in-person clinic visit. --Shoaib Khanna MD on 10/22/2020 at 2:54 PM    An electronic signature was used to authenticate this note.

## 2020-11-17 ENCOUNTER — HOSPITAL ENCOUNTER (OUTPATIENT)
Dept: GENERAL RADIOLOGY | Age: 70
Discharge: HOME | End: 2020-11-19
Payer: MEDICARE

## 2020-11-17 ENCOUNTER — OFFICE VISIT (OUTPATIENT)
Dept: FAMILY MEDICINE CLINIC | Age: 70
End: 2020-11-17
Payer: MEDICARE

## 2020-11-17 VITALS
SYSTOLIC BLOOD PRESSURE: 130 MMHG | OXYGEN SATURATION: 95 % | HEIGHT: 68 IN | BODY MASS INDEX: 32.54 KG/M2 | DIASTOLIC BLOOD PRESSURE: 70 MMHG | HEART RATE: 61 BPM | TEMPERATURE: 97.5 F

## 2020-11-17 PROCEDURE — 73610 X-RAY EXAM OF ANKLE: CPT

## 2020-11-17 PROCEDURE — 99214 OFFICE O/P EST MOD 30 MIN: CPT | Performed by: FAMILY MEDICINE

## 2020-11-17 PROCEDURE — G8484 FLU IMMUNIZE NO ADMIN: HCPCS | Performed by: FAMILY MEDICINE

## 2020-11-17 PROCEDURE — 3017F COLORECTAL CA SCREEN DOC REV: CPT | Performed by: FAMILY MEDICINE

## 2020-11-17 PROCEDURE — G8417 CALC BMI ABV UP PARAM F/U: HCPCS | Performed by: FAMILY MEDICINE

## 2020-11-17 PROCEDURE — 4040F PNEUMOC VAC/ADMIN/RCVD: CPT | Performed by: FAMILY MEDICINE

## 2020-11-17 PROCEDURE — G8427 DOCREV CUR MEDS BY ELIG CLIN: HCPCS | Performed by: FAMILY MEDICINE

## 2020-11-17 PROCEDURE — G8399 PT W/DXA RESULTS DOCUMENT: HCPCS | Performed by: FAMILY MEDICINE

## 2020-11-17 PROCEDURE — 1090F PRES/ABSN URINE INCON ASSESS: CPT | Performed by: FAMILY MEDICINE

## 2020-11-17 PROCEDURE — 1036F TOBACCO NON-USER: CPT | Performed by: FAMILY MEDICINE

## 2020-11-17 PROCEDURE — G0439 PPPS, SUBSEQ VISIT: HCPCS | Performed by: FAMILY MEDICINE

## 2020-11-17 PROCEDURE — 1123F ACP DISCUSS/DSCN MKR DOCD: CPT | Performed by: FAMILY MEDICINE

## 2020-11-17 RX ORDER — PREDNISONE 20 MG/1
20 TABLET ORAL 2 TIMES DAILY
Qty: 10 TABLET | Refills: 0 | Status: SHIPPED | OUTPATIENT
Start: 2020-11-17 | End: 2020-11-22

## 2020-11-17 RX ORDER — PREDNISONE 20 MG/1
20 TABLET ORAL 2 TIMES DAILY
Qty: 10 TABLET | Refills: 0 | Status: SHIPPED | OUTPATIENT
Start: 2020-11-17 | End: 2020-11-17 | Stop reason: SDUPTHER

## 2020-11-17 ASSESSMENT — PATIENT HEALTH QUESTIONNAIRE - PHQ9
SUM OF ALL RESPONSES TO PHQ QUESTIONS 1-9: 0
SUM OF ALL RESPONSES TO PHQ QUESTIONS 1-9: 0
1. LITTLE INTEREST OR PLEASURE IN DOING THINGS: 0
2. FEELING DOWN, DEPRESSED OR HOPELESS: 0
SUM OF ALL RESPONSES TO PHQ9 QUESTIONS 1 & 2: 0
SUM OF ALL RESPONSES TO PHQ QUESTIONS 1-9: 0

## 2020-11-17 ASSESSMENT — LIFESTYLE VARIABLES: HOW OFTEN DO YOU HAVE A DRINK CONTAINING ALCOHOL: 0

## 2020-11-17 ASSESSMENT — ENCOUNTER SYMPTOMS
COUGH: 0
ABDOMINAL PAIN: 0
CHEST TIGHTNESS: 0
SHORTNESS OF BREATH: 0
WHEEZING: 0
NAUSEA: 0
DIARRHEA: 1

## 2020-11-17 NOTE — PROGRESS NOTES
Medicare Annual Wellness Visit  Name: Margy Cuellar Date: 2020   MRN: [id number] Sex: Female   Age: 69 y.o. Ethnicity: Non-/Non    : 1950 Race: Souleymane Sagastume is here for Medicare AWV and Foot Pain (right, no known injury, not sure if stress fracture, started Sat night)    Screenings for behavioral, psychosocial and functional/safety risks, and cognitive dysfunction are all negative except as indicated below. These results, as well as other patient data from the [address] form, are documented in Flowsheets linked to this Encounter. Allergies   Allergen Reactions    Codeine Other (See Comments)     Gives her a headache    Celebrex [Celecoxib] Rash     Whole body         Prior to Visit Medications    Medication Sig Taking?  Authorizing Provider   predniSONE (DELTASONE) 20 MG tablet Take 1 tablet by mouth 2 times daily for 5 days Yes Read MD Deandre   propranolol (INDERAL LA) 60 MG extended release capsule take 1 capsule by mouth once daily Yes Read MD Deandre   buPROPion (WELLBUTRIN XL) 150 MG extended release tablet Take 1 tablet by mouth every morning Yes Read MD Deandre   cholestyramine Eugenedee Jaki) 4 g packet Take 1 packet by mouth daily Yes Read MD Deandre   triamcinolone (KENALOG) 0.025 % cream Apply Topically daily as needed for itching Yes Read MD Deandre   pantoprazole (PROTONIX) 40 MG tablet take 1 tablet by mouth once daily Yes Read MD Deandre   NONFORMULARY Take 1 capsule by mouth daily Cholesterol 360 Yes Historical Provider, MD   Multiple Vitamins-Minerals (PRESERVISION AREDS 2) CHEW Take 1 each by mouth 2 times daily Yes Historical Provider, MD   VITAMIN E PO Take 1 capsule by mouth daily Yes Historical Provider, MD   Ascorbic Acid (VITAMIN C PO) Take 1 tablet by mouth daily Yes Historical Provider, MD   Cholecalciferol (VITAMIN D3 PO) Take 1 tablet by mouth daily Yes Historical Provider, MD   BIOTIN PO Take 1 tablet by mouth daily Yes Historical Provider, MD   FLUoxetine (PROZAC) 40 MG capsule take 1 capsule by mouth once daily Yes Chacho Morrow MD   albuterol (PROVENTIL) (2.5 MG/3ML) 0.083% nebulizer solution Take 3 mLs by nebulization every 4 hours as needed for Wheezing or Shortness of Breath Yes ROSE MARIE Alvarez   Nebulizers (COMPRESSOR/NEBULIZER) MISC Use as directed Yes ROSE MARIE Alvarez   hydrOXYzine (ATARAX) 25 MG tablet Take 1 tablet by mouth nightly as needed (intersitial cystitis) Yes Chacho Morrow MD   montelukast (SINGULAIR) 10 MG tablet take 1 tablet by mouth every evening Yes Chacho Morrow MD   CALCIUM-MAGNESIUM-VITAMIN D PO Take by mouth Yes Historical Provider, MD   pseudoephedrine (SUDAFED 12 HR) 120 MG extended release tablet Take 120 mg by mouth every 12 hours Yes Historical Provider, MD   aspirin-acetaminophen-caffeine (Curlene Donegal) 353-894-76 MG per tablet Take 1 tablet by mouth as needed for Headaches Yes Historical Provider, MD   fluticasone (FLONASE) 50 MCG/ACT nasal spray by Nasal route Yes Historical Provider, MD   ibuprofen (ADVIL;MOTRIN) 800 MG tablet Take 1 tablet by mouth every 8 hours as needed for Pain Yes Esthela Gilliam MD         Past Medical History:   Diagnosis Date    Allergic rhinitis     Anxiety     Asthma     Bronchitis     Chest pain     Chronic back pain     Depression     Fibromyalgia     GERD (gastroesophageal reflux disease)     Headache(784.0)     Heart palpitations     Irregular bowel habits     Macular degeneration     beginning    Measles     Osteoarthritis     Restless legs syndrome     Tachycardia     Unspecified sleep apnea     Wet senile macular degeneration (HCC)     bilateral- Sees a retinal Dr Nicko Dallas       Past Surgical History:   Procedure Laterality Date    COLONOSCOPY      DILATION AND CURETTAGE OF UTERUS N/A 03/12/2019    DILATION AND CURETTAGE OF UTERUS N/A 3/12/2019    Dilatation & Curettage Hysteroscopy w/ polypectomy performed by Radha Garza MD at y [address]      cataracts removed right eye    KNEE ARTHROSCOPY      left    KNEE SURGERY      total replacement-right    KNEE SURGERY Left 11/2014    SIGMOIDOSCOPY      SINUS SURGERY      TONSILLECTOMY      TUBAL LIGATION           Family History   Problem Relation Age of Onset    Cancer Father         lung    Heart Disease Father     Cancer Paternal Grandmother     Heart Disease Paternal Grandmother     Other Daughter         brain stem    Vision Loss Mother     Breast Cancer Sister        CareTeam (Including outside providers/suppliers regularly involved in providing care):   Patient Care Team:  Taco Ferrara MD as PCP - Dante Serrano MD as PCP - [facility] Empaneled Provider    Wt Readings from Last 3 Encounters:   09/15/20 214 lb (97.1 kg)   05/12/20 207 lb (93.9 kg)   01/12/20 201 lb (91.2 kg)     Vitals:    11/17/20 1529   BP: 130/70   Pulse: 61   Temp: 97.5 °F (36.4 °C)   SpO2: 95%   Height: 5' 8\" (1.727 m)     Body mass index is 32.54 kg/m². Based upon direct observation of the patient, evaluation of cognition reveals recent and remote memory intact. Patient's complete Health Risk Assessment and screening values have been reviewed and are found in Flowsheets. The following problems were reviewed today and where indicated follow up appointments were made and/or referrals ordered. Positive Risk Factor Screenings with Interventions:       General Health and ACP:  General  In general, how would you say your health is?: Good  In the past 7 days, have you experienced any of the following?  New or Increased Pain, New or Increased Fatigue, Loneliness, Social Isolation, Stress or Anger?: (!) New or Increased Pain  Do you get the social and emotional support that you need?: Yes  Do you have a Living Will?: (!) No  Advance Directives     Power of  Living Will ACP-Advance Directive ACP-Power of     Not on File Not by mouth 2 times daily for 5 days                      LAURA Jeimy [address] [place] [address]  Dept: [phone]  Dept Fax: [phone]  Loc: [phone]    Rosanna Crawford a 69 y.o. female who presents today for her medical conditions/complaints as notedbelow. Rosanna Crawford is c/o of   Chief Complaint   Patient presents with    Medicare AWV    Foot Pain     right, no known injury, not sure if stress fracture, started Sat night       HPI:     Here today for her [address] and foot pain. She also would like to follow up on her migraines and IBS-D. Foot Pain    The pain is present in the right ankle. This is a new problem. The current episode started in the past 7 days (3 days). There has been no history of extremity trauma. The problem occurs intermittently. The problem has been unchanged. The quality of the pain is described as sharp. The pain is at a severity of 8/10. The pain is moderate. Associated symptoms include an inability to bear weight and joint swelling (very mild). Pertinent negatives include no fever or limited range of motion. The symptoms are aggravated by standing and activity. She has tried rest and heat for the symptoms. The treatment provided no relief. Her past medical history is significant for osteoarthritis. Migraines: stable; she has has been having a few more problems than normal because now she is having headaches in the occipital region and in the sinuses. She is using propranolol for prevention and she is using prn decongestant to help with the headaches. She does think the propranolol has helped. She does not get lightheaded or dizzy upon standing. IBS-D: she has been using Arabella, probiotics and benefiber and it has firmed up her stool but she is still having some accidents. She is not having any blood in the stool. She is not having any issues with abdominal pain. The diarrhea is worse when she is stressed. The last time I saw her I recommended imagining of her back since she has been incontinent of bladder and bowels. She saw her niece last week on 11/10 and her niece started feeling ill on  and her covid test came back positive yesterday (). Currently Kee Santillan is asymptomatic. I advised her to call if she develops symptoms    Past Medical History:   Diagnosis Date    Allergic rhinitis     Anxiety     Asthma     Bronchitis     Chest pain     Chronic back pain     Depression     Fibromyalgia     GERD (gastroesophageal reflux disease)     Headache(784.0)     Heart palpitations     Irregular bowel habits     Macular degeneration     beginning    Measles     Osteoarthritis     Restless legs syndrome     Tachycardia     Unspecified sleep apnea     Wet senile macular degeneration (HCC)     bilateral- Sees a retinal Dr Matt Carranza          Social History     Tobacco Use    Smoking status: Former Smoker     Packs/day: 1.00     Types: Cigarettes     Last attempt to quit: 1993     Years since quittin.0    Smokeless tobacco: Never Used   Substance Use Topics    Alcohol use:  Yes     Alcohol/week: 2.0 standard drinks     Types: 2 Glasses of wine per week     Current Outpatient Medications   Medication Sig Dispense Refill    predniSONE (DELTASONE) 20 MG tablet Take 1 tablet by mouth 2 times daily for 5 days 10 tablet 0    propranolol (INDERAL LA) 60 MG extended release capsule take 1 capsule by mouth once daily 30 capsule 3    buPROPion (WELLBUTRIN XL) 150 MG extended release tablet Take 1 tablet by mouth every morning 90 tablet 3    cholestyramine (QUESTRAN) 4 g packet Take 1 packet by mouth daily 30 packet 1    triamcinolone (KENALOG) 0.025 % cream Apply Topically daily as needed for itching 80 g 1    pantoprazole (PROTONIX) 40 MG tablet take 1 tablet by mouth once daily 30 tablet 5    NONFORMULARY Take 1 capsule by mouth daily Cholesterol 360      Multiple Vitamins-Minerals (PRESERVISION AREDS 2) CHEW Take 1 each by mouth 2 times daily      VITAMIN E PO Take 1 capsule by mouth daily      Ascorbic Acid (VITAMIN C PO) Take 1 tablet by mouth daily      Cholecalciferol (VITAMIN D3 PO) Take 1 tablet by mouth daily      BIOTIN PO Take 1 tablet by mouth daily      FLUoxetine (PROZAC) 40 MG capsule take 1 capsule by mouth once daily 90 capsule 3    albuterol (PROVENTIL) (2.5 MG/3ML) 0.083% nebulizer solution Take 3 mLs by nebulization every 4 hours as needed for Wheezing or Shortness of Breath 25 each 3    Nebulizers (COMPRESSOR/NEBULIZER) MISC Use as directed 1 each 3    hydrOXYzine (ATARAX) 25 MG tablet Take 1 tablet by mouth nightly as needed (intersitial cystitis) 90 tablet 3    montelukast (SINGULAIR) 10 MG tablet take 1 tablet by mouth every evening 90 tablet 3    CALCIUM-MAGNESIUM-VITAMIN D PO Take by mouth      pseudoephedrine (SUDAFED 12 HR) 120 MG extended release tablet Take 120 mg by mouth every 12 hours      aspirin-acetaminophen-caffeine (EXCEDRIN MIGRAINE) 250-250-65 MG per tablet Take 1 tablet by mouth as needed for Headaches      fluticasone (FLONASE) 50 MCG/ACT nasal spray by Nasal route      ibuprofen (ADVIL;MOTRIN) 800 MG tablet Take 1 tablet by mouth every 8 hours as needed for Pain 30 tablet 0     No current facility-administered medications for this visit. Allergies   Allergen Reactions    Codeine Other (See Comments)     Gives her a headache    Celebrex [Celecoxib] Rash     Whole body       Subjective:     Review of Systems   Constitutional: Negative for activity change, appetite change, chills, fatigue and fever. Eyes: Negative for visual disturbance. Respiratory: Negative for cough, chest tightness, shortness of breath and wheezing. Cardiovascular: Negative for chest pain, palpitations and leg swelling. Gastrointestinal: Positive for diarrhea. Negative for abdominal pain and nausea. Genitourinary: Negative for difficulty urinating. Neurological: Positive for headaches. Negative for dizziness, syncope, weakness and light-headedness. Psychiatric/Behavioral: Negative for dysphoric mood and sleep disturbance. The patient is not nervous/anxious. Objective:      Physical Exam  Vitals signs and nursing note reviewed. Constitutional:       General: She is not in acute distress. Appearance: She is well-developed. Eyes:      Conjunctiva/sclera: Conjunctivae normal.   Neck:      Musculoskeletal: Normal range of motion and neck supple. Thyroid: No thyromegaly. Cardiovascular:      Rate and Rhythm: Normal rate and regular rhythm. Heart sounds: Normal heart sounds. No murmur. Pulmonary:      Effort: Pulmonary effort is normal. No respiratory distress. Breath sounds: Normal breath sounds. No wheezing. Musculoskeletal:      Right ankle: She exhibits normal range of motion, no swelling, no ecchymosis and no deformity. Tenderness. Lateral malleolus and CF ligament tenderness found. Achilles tendon exhibits no pain, no defect and normal Clemens's test results. Lymphadenopathy:      Cervical: No cervical adenopathy. Skin:     General: Skin is warm and dry. Findings: No erythema or rash. Neurological:      Mental Status: She is alert and oriented to person, place, and time. /70   Pulse 61   Temp 97.5 °F (36.4 °C)   Ht 5' 8\" (1.727 m)   SpO2 95%   BMI 32.54 kg/m²     Assessment:       Diagnosis Orders   1. Routine general medical examination at a health care facility     2. Acute right ankle pain  XR ANKLE RIGHT (MIN 3 VIEWS)    Afo ankle gauntlet pre ots   3. Chronic migraine without aura with status migrainosus, not intractable     4. Irritable bowel syndrome with diarrhea               Plan:        MWV: see other note    Ankle pain: new; her xray did not show a fracture so I suspect she just sprained her ankle.   I suggested Nori Stock take

## 2020-11-17 NOTE — PATIENT INSTRUCTIONS
Personalized Preventive Plan for Cheng Arias - 11/17/2020  Medicare offers a range of preventive health benefits. Some of the tests and screenings are paid in full while other may be subject to a deductible, co-insurance, and/or copay. Some of these benefits include a comprehensive review of your medical history including lifestyle, illnesses that may run in your family, and various assessments and screenings as appropriate. After reviewing your medical record and screening and assessments performed today your provider may have ordered immunizations, labs, imaging, and/or referrals for you. A list of these orders (if applicable) as well as your Preventive Care list are included within your After Visit Summary for your review. Other Preventive Recommendations:    · A preventive eye exam performed by an eye specialist is recommended every 1-2 years to screen for glaucoma; cataracts, macular degeneration, and other eye disorders. · A preventive dental visit is recommended every 6 months. · Try to get at least 150 minutes of exercise per week or 10,000 steps per day on a pedometer . · Order or download the FREE \"Exercise & Physical Activity: Your Everyday Guide\" from [employer]. Call 1-378.808.1176 or search [employer] online. · You need 6604-0937 mg of calcium and 2078-0171 IU of vitamin D per day. It is possible to meet your calcium requirement with diet alone, but a vitamin D supplement is usually necessary to meet this goal.  · When exposed to the sun, use a sunscreen that protects against both UVA and UVB radiation with an SPF of 30 or greater. Reapply every 2 to 3 hours or after sweating, drying off with a towel, or swimming. · Always wear a seat belt when traveling in a car. Always wear a helmet when riding a bicycle or motorcycle.   Patient Education        Ankle Sprain: Rehab Exercises  Introduction  Here are some examples of exercises for you to try. The exercises may be suggested for a condition or for rehabilitation. Start each exercise slowly. Ease off the exercises if you start to have pain. You will be told when to start these exercises and which ones will work best for you. How to do the exercises  \"Alphabet\" exercise   Trace the alphabet with your toe. This helps your ankle move in all directions. Side-to-side knee swing exercise   Sit in a chair with your foot flat on the floor. Slowly move your knee from side to side. Keep your foot pressed flat. Continue this exercise for 2 to 3 minutes. Towel curl   While sitting, place your foot on a towel on the floor. Scrunch the towel toward you with your toes. Then use your toes to push the towel away from you. To make this exercise more challenging you can put something on the other end of the towel. A can of soup is about the right weight for this. Towel stretch   Sit with your legs extended and knees straight. Place a towel around your foot just under the toes. Hold each end of the towel in each hand, with your hands above your knees. Pull back with the towel so that your foot stretches toward you. Hold the position for at least 15 to 30 seconds. Repeat 2 to 4 times a session. Do up to 5 sessions a day. Ankle eversion exercise   Start by sitting with your foot flat on the floor. Push your foot outward against a wall or a piece of furniture that doesn't move. Hold for about 6 seconds, and relax. Repeat 8 to 12 times. After you feel comfortable with this, try using rubber tubing looped around the outside of your feet for resistance. Push your foot out to the side against the tubing, and then count to 10 as you slowly bring your foot back to the middle. Repeat 8 to 12 times. Isometric opposition exercises   While sitting, put your feet together flat on the floor. Press your injured foot inward against your other foot. Hold for about 6 seconds, and relax.  Repeat 8 to 12 times.  Then place the heel of your other foot on top of the injured one. Push down with the top heel while trying to push up with your injured foot. Hold for about 6 seconds, and relax. Repeat 8 to 12 times. Resisted ankle inversion   Sit on the floor with your good leg crossed over your other leg. Hold both ends of an exercise band and loop the band around the inside of your affected foot. Then press your other foot against the band. Keeping your legs crossed, slowly push your affected foot against the band so that foot moves away from your other foot. Then slowly relax. Repeat 8 to 12 times. Resisted ankle eversion   Sit on the floor with your legs straight. Hold both ends of an exercise band and loop the band around the outside of your affected foot. Then press your other foot against the band. Keeping your leg straight, slowly push your affected foot outward against the band and away from your other foot without letting your leg rotate. Then slowly relax. Repeat 8 to 12 times. Resisted ankle dorsiflexion   Tie the ends of an exercise band together to form a loop. Attach one end of the loop to a secure object or shut a door on it to hold it in place. (Or you can have someone hold one end of the loop to provide resistance.)  While sitting on the floor or in a chair, loop the other end of the band over the top of your affected foot. Keeping your knee and leg straight, slowly flex your foot to pull back on the exercise band, and then slowly relax. Repeat 8 to 12 times. Single-leg balance   Stand on a flat surface with your arms stretched out to your sides like you are making the letter \"T. \" Then lift your good leg off the floor, bending it at the knee. If you are not steady on your feet, use one hand to hold on to a chair, counter, or wall. Standing on the leg with your affected ankle, keep that knee straight. Try to balance on that leg for up to 30 seconds.  Then rest for up to 10 seconds. Repeat 6 to 8 times. When you can balance on your affected leg for 30 seconds with your eyes open, try to balance on it with your eyes closed. When you can do this exercise with your eyes closed for 30 seconds and with ease and no pain, try standing on a pillow or piece of foam, and repeat steps 1 through 4. Follow-up care is a key part of your treatment and safety. Be sure to make and go to all appointments, and call your doctor if you are having problems. It's also a good idea to know your test results and keep a list of the medicines you take. Where can you learn more? Go to https://[employer] [employer] of Health (NIH)eb.[employer]. org and sign in to your [employer] account. Enter [employer] in the [employer] box to learn more about \"Ankle Sprain: Rehab Exercises. \"     If you do not have an account, please click on the \"Sign Up Now\" link. Current as of: March 2, 2020               Content Version: 12.6  © 2006-2020 [employer]. Care instructions adapted under license by [facility] [place] ([facility]). If you have questions about a medical condition or this instruction, always ask your healthcare professional. [place] [address] any warranty or liability for your use of this information.

## 2020-12-22 RX ORDER — CHOLESTYRAMINE 4 G/9G
1 POWDER, FOR SUSPENSION ORAL DAILY
Qty: 30 PACKET | Refills: 3 | Status: SHIPPED | OUTPATIENT
Start: 2020-12-22 | End: 2021-05-10

## 2020-12-22 NOTE — TELEPHONE ENCOUNTER
Jayy Parsons called requesting a refill of the below medication which has been pended for you:     Requested Prescriptions     Pending Prescriptions Disp Refills    cholestyramine (QUESTRAN) 4 g packet 30 packet 1     Sig: Take 1 packet by mouth daily       Last Appointment Date: 11/17/2020  Next Appointment Date: 5/18/2021    Allergies   Allergen Reactions    Codeine Other (See Comments)     Gives her a headache    Celebrex [Celecoxib] Rash     Whole body

## 2021-01-11 RX ORDER — MONTELUKAST SODIUM 10 MG/1
TABLET ORAL
Qty: 90 TABLET | Refills: 1 | Status: SHIPPED | OUTPATIENT
Start: 2021-01-11 | End: 2021-07-08

## 2021-02-08 RX ORDER — HYDROXYZINE HYDROCHLORIDE 25 MG/1
25 TABLET, FILM COATED ORAL NIGHTLY PRN
Qty: 90 TABLET | Refills: 1 | Status: SHIPPED | OUTPATIENT
Start: 2021-02-08 | End: 2021-09-15 | Stop reason: SDUPTHER

## 2021-02-08 NOTE — TELEPHONE ENCOUNTER
Beverly Tam called requesting a refill of the below medication which has been pended for you:     Requested Prescriptions     Pending Prescriptions Disp Refills    hydrOXYzine (ATARAX) 25 MG tablet 90 tablet 1     Sig: Take 1 tablet by mouth nightly as needed (intersitial cystitis)       Last Appointment Date: 11/17/2020  Next Appointment Date: 5/18/2021    Allergies   Allergen Reactions    Codeine Other (See Comments)     Gives her a headache    Celebrex [Celecoxib] Rash     Whole body

## 2021-02-19 ENCOUNTER — OFFICE VISIT (OUTPATIENT)
Dept: PRIMARY CARE CLINIC | Age: 71
End: 2021-02-19
Payer: MEDICARE

## 2021-02-19 ENCOUNTER — HOSPITAL ENCOUNTER (OUTPATIENT)
Age: 71
Setting detail: SPECIMEN
Discharge: HOME | End: 2021-02-19
Payer: MEDICARE

## 2021-02-19 VITALS
DIASTOLIC BLOOD PRESSURE: 80 MMHG | HEART RATE: 71 BPM | HEIGHT: 68 IN | TEMPERATURE: 97.1 F | OXYGEN SATURATION: 95 % | WEIGHT: 222.6 LBS | SYSTOLIC BLOOD PRESSURE: 132 MMHG | RESPIRATION RATE: 18 BRPM | BODY MASS INDEX: 33.74 KG/M2

## 2021-02-19 DIAGNOSIS — R05.9 COUGH: ICD-10-CM

## 2021-02-19 DIAGNOSIS — Z20.822 PERSON UNDER INVESTIGATION FOR COVID-19: ICD-10-CM

## 2021-02-19 DIAGNOSIS — R09.81 CONGESTION OF NASAL SINUS: ICD-10-CM

## 2021-02-19 DIAGNOSIS — J04.0 LARYNGITIS, ACUTE: ICD-10-CM

## 2021-02-19 DIAGNOSIS — H66.002 NON-RECURRENT ACUTE SUPPURATIVE OTITIS MEDIA OF LEFT EAR WITHOUT SPONTANEOUS RUPTURE OF TYMPANIC MEMBRANE: Primary | ICD-10-CM

## 2021-02-19 PROCEDURE — 1123F ACP DISCUSS/DSCN MKR DOCD: CPT | Performed by: NURSE PRACTITIONER

## 2021-02-19 PROCEDURE — 1090F PRES/ABSN URINE INCON ASSESS: CPT | Performed by: NURSE PRACTITIONER

## 2021-02-19 PROCEDURE — G8417 CALC BMI ABV UP PARAM F/U: HCPCS | Performed by: NURSE PRACTITIONER

## 2021-02-19 PROCEDURE — G8484 FLU IMMUNIZE NO ADMIN: HCPCS | Performed by: NURSE PRACTITIONER

## 2021-02-19 PROCEDURE — 99212 OFFICE O/P EST SF 10 MIN: CPT

## 2021-02-19 PROCEDURE — G8399 PT W/DXA RESULTS DOCUMENT: HCPCS | Performed by: NURSE PRACTITIONER

## 2021-02-19 PROCEDURE — 99213 OFFICE O/P EST LOW 20 MIN: CPT | Performed by: NURSE PRACTITIONER

## 2021-02-19 PROCEDURE — 1036F TOBACCO NON-USER: CPT | Performed by: NURSE PRACTITIONER

## 2021-02-19 PROCEDURE — 3017F COLORECTAL CA SCREEN DOC REV: CPT | Performed by: NURSE PRACTITIONER

## 2021-02-19 PROCEDURE — G8427 DOCREV CUR MEDS BY ELIG CLIN: HCPCS | Performed by: NURSE PRACTITIONER

## 2021-02-19 PROCEDURE — U0003 INFECTIOUS AGENT DETECTION BY NUCLEIC ACID (DNA OR RNA); SEVERE ACUTE RESPIRATORY SYNDROME CORONAVIRUS 2 (SARS-COV-2) (CORONAVIRUS DISEASE [COVID-19]), AMPLIFIED PROBE TECHNIQUE, MAKING USE OF HIGH THROUGHPUT TECHNOLOGIES AS DESCRIBED BY CMS-2020-01-R: HCPCS

## 2021-02-19 PROCEDURE — U0005 INFEC AGEN DETEC AMPLI PROBE: HCPCS

## 2021-02-19 PROCEDURE — 4040F PNEUMOC VAC/ADMIN/RCVD: CPT | Performed by: NURSE PRACTITIONER

## 2021-02-19 RX ORDER — AMOXICILLIN AND CLAVULANATE POTASSIUM 875; 125 MG/1; MG/1
1 TABLET, FILM COATED ORAL 2 TIMES DAILY
Qty: 20 TABLET | Refills: 0 | Status: SHIPPED | OUTPATIENT
Start: 2021-02-19 | End: 2021-03-01

## 2021-02-19 ASSESSMENT — PATIENT HEALTH QUESTIONNAIRE - PHQ9
1. LITTLE INTEREST OR PLEASURE IN DOING THINGS: 0
2. FEELING DOWN, DEPRESSED OR HOPELESS: 0
SUM OF ALL RESPONSES TO PHQ QUESTIONS 1-9: 0

## 2021-02-19 ASSESSMENT — ENCOUNTER SYMPTOMS
HOARSE VOICE: 1
SHORTNESS OF BREATH: 0
VOICE CHANGE: 1
DIARRHEA: 0
WHEEZING: 0
SINUS PRESSURE: 1
SORE THROAT: 1
VOMITING: 0
COUGH: 1
NAUSEA: 0

## 2021-02-19 NOTE — PATIENT INSTRUCTIONS
Will notify you of covid test result as soon as available. You should isolate at home in an area away from family. If you must be around family members, please wear a mask. Quarantine at home until result is available. This means do not go to work/school, attend family gatherings, or invite others to your home until you know your test results. A work/school note will be provided for you. Will send in Augmentin twice daily x 10 days for left ear infection. Take full course of antibiotic even if you are feeling better. Recommend increasing your water intake to help thin secretions and maintain hydration. May try warm salt water gargles, chloraseptic throat spray, or lozenges such as Cepacol sore throat lozenges, for throat pain. Cool beverages and popsicles can help as well. May start flonase nasal spray twice daily to help with sinus symptoms. May use tylenol for fever/body aches/headache. Use cool mist humidifier at bedtime. Use nasal saline flush as needed. Practice good hand hygiene and cover your cough and sneeze to prevent passing virus on. If symptoms worsen or fail to improve, please return to clinic. If you develop severe worsening of symptoms such as chest pain or difficulty breathing, please go to ER. Patient Education        Ear Infection (Otitis Media): Care Instructions  Overview     An ear infection may start with a cold and affect the middle ear (otitis media). It can hurt a lot. Most ear infections clear up on their own in a couple of days and do not need antibiotics. Also, antibiotics do not work against viruses, which may be the cause of your infection. Regular doses of pain relievers are the best way to reduce your fever and help you feel better. Follow-up care is a key part of your treatment and safety. Be sure to make and go to all appointments, and call your doctor if you are having problems. It's also a good idea to know your test results and keep a list of the medicines you take. How can you care for yourself at home? · Take pain medicines exactly as directed. ? If the doctor gave you a prescription medicine for pain, take it as prescribed. ? If you are not taking a prescription pain medicine, take an over-the-counter medicine, such as acetaminophen (Tylenol), ibuprofen (Advil, Motrin), or naproxen (Aleve). Read and follow all instructions on the label. ? Do not take two or more pain medicines at the same time unless the doctor told you to. Many pain medicines have acetaminophen, which is Tylenol. Too much acetaminophen (Tylenol) can be harmful. · Plan to take a full dose of pain reliever before bedtime. Getting enough sleep will help you get better. · Try a warm, moist washcloth on the ear. It may help relieve pain. · If your doctor prescribed antibiotics, take them as directed. Do not stop taking them just because you feel better. You need to take the full course of antibiotics. When should you call for help? Call your doctor now or seek immediate medical care if:    · You have new or increasing ear pain.     · You have new or increasing pus or blood draining from your ear.     · You have a fever with a stiff neck or a severe headache. Watch closely for changes in your health, and be sure to contact your doctor if:    · You have new or worse symptoms.     · You are not getting better after taking an antibiotic for 2 days. Where can you learn more? Go to https://[employer]reams [employer]. org and sign in to your [employer] account. Enter L648 in the DFine box to learn more about \"Ear Infection (Otitis Media): Care Instructions. \"     If you do not have an account, please click on the \"Sign Up Now\" link. Current as of: April 15, 2020               Content Version: 12.6  © 7203-1948 [employer]. Care instructions adapted under license by [facility] ([facility]). If you have questions about a medical condition or this instruction, always ask your healthcare professional. [address] any warranty or liability for your use of this information. Patient Education        Laryngitis: Care Instructions  Your Care Instructions     Laryngitis is an inflammation of the voice box (larynx) that causes your voice to become raspy or hoarse. Most of the time, laryngitis comes on quickly and lasts as long as 2 weeks. It is caused by overuse, irritation, or infection of the vocal cords inside the larynx. Some of the most common causes are a cold, the flu, or allergies. Loud talking, shouting, cheering, or singing also can cause laryngitis. Stomach acid that backs up into the throat also can make you lose your voice. Resting your voice and taking other steps at home can help you get your voice back. Follow-up care is a key part of your treatment and safety. Be sure to make and go to all appointments, and call your doctor if you are having problems. It's also a good idea to know your test results and keep a list of the medicines you take. How can you care for yourself at home? · Rest your voice. You do not have to stop speaking, but use your voice as little as possible. Speak softly but do not whisper; whispering can bother your larynx more than speaking softly. Avoid talking on the telephone or trying to speak loudly. · Drink plenty of water to keep your throat moist.  · Before you use cough and cold medicines, check the label. They may not be safe for young children or for people with certain health problems. · Try to keep stomach acid from backing up into your throat. Do not eat just before bedtime. Reduce the amount of coffee and alcohol you drink, and eat healthy foods. Taking over-the-counter acid reducers can help when these steps are not enough. In some cases, you may need prescription medicine. · Try not to clear your throat. This can cause more irritation of your larynx. Take an over-the-counter cough suppressant (if your doctor recommends it) if you have a dry cough that does not produce mucus. · Use saline (saltwater) nasal washes to help keep your nasal passages open and wash out mucus and bacteria. You can buy saline nose drops at a grocery store or drugstore. Or, you can make your own at home by mixing ½ teaspoon salt, 1 cup water (at room temperature), and ½ teaspoon baking soda. If you make your own, fill a bulb syringe with the solution, insert the tip into your nostril, and squeeze gently. [place] your nose. · Follow your doctor's directions for treating the condition that caused you to lose your voice. If your doctor prescribed antibiotics, take them as directed. Do not stop taking them just because you feel better. You need to take the full course of antibiotics. · Do not smoke or allow others to smoke around you. If you need help quitting, talk to your doctor about stop-smoking programs and medicines. These can increase your chances of quitting for good. When should you call for help? Call 911 anytime you think you may need emergency care. For example, call if:    · You have trouble breathing. Call your doctor now or seek immediate medical care if:    · You have new or worse pain.     · You have trouble swallowing. Watch closely for changes in your health, and be sure to contact your doctor if:    · You do not get better as expected. Where can you learn more? Go to https://chpepiceweb.[employer]. org and sign in to your [employer] account. Enter X572 in the [facility] box to learn more about \"Laryngitis: Care Instructions. \"     If you do not have an account, please click on the \"Sign Up Now\" link. Current as of: April 15, 2020               Content Version: 12.6  © 1356-5737 [employer]. Care instructions adapted under license by [facility]. If you have questions about a medical condition or this instruction, always ask your healthcare professional. [address] any warranty or liability for your use of this information. Patient Education        Learning About Coronavirus [phone])  What is coronavirus (COVID-19)? COVID-19 is a disease caused by a new type of coronavirus. This illness was first found in December 2019. It has since spread worldwide. Coronaviruses are a large group of viruses. They cause the common cold. They also cause more serious illnesses like Middle East respiratory syndrome (MERS) and severe acute respiratory syndrome (SARS). COVID-19 is caused by a novel coronavirus. That means it's a new type that has not been seen in people before. What are the symptoms? Coronavirus (COVID-19) symptoms may include:  · Fever. · Cough. · Trouble breathing. · Chills or repeated shaking with chills. · Muscle pain. · Headache. · Sore throat. · New loss of taste or smell. · Vomiting. · Diarrhea. In severe cases, COVID-19 can cause pneumonia and make it hard to breathe without help from a machine. It can cause death. How is it diagnosed? COVID-19 is diagnosed with a viral test. This may also be called a PCR test or antigen test. It looks for evidence of the virus in your breathing passages or lungs (respiratory system). The test is most often done on a sample from the nose, throat, or lungs. It's sometimes done on a sample of saliva. One way a sample is collected is by putting a long swab into the back of your nose. How is it treated? Mild cases of COVID-19 can be treated at home. Serious cases need treatment in the hospital. Treatment may include medicines to reduce symptoms, plus breathing support such as oxygen therapy or a ventilator. Some people may be placed on their belly to help their oxygen levels. Treatments that may help people who have COVID-19 include:  Antiviral medicines. These medicines treat viral infections. Remdesivir is an example. Immune-based therapy. These medicines help the immune system fight COVID-19. One example is bamlanivimab. It's a monoclonal antibody. Blood thinners. These medicines help prevent blood clots. People with severe illness are at risk for blood clots. How can you protect yourself and others? The best way to protect yourself from getting sick is to:  · Avoid areas where there is an outbreak. · Avoid contact with people who may be infected. · Avoid crowds and try to stay at least 6 feet away from other people. · Wash your hands often, especially after you cough or sneeze. Use soap and water, and scrub for at least 20 seconds. If soap and water aren't available, use an alcohol-based hand . · Avoid touching your mouth, nose, and eyes. To help avoid spreading the virus to others:  · Stay home if you are sick or have been exposed to the virus. Don't go to school, work, or public areas. And don't use public transportation, ride-shares, or taxis unless you have no choice. · Wear a cloth face cover if you have to go to public areas. · Cover your mouth with a tissue when you cough or sneeze. Then throw the tissue in the trash and wash your hands right away.   · If you're sick: ? Leave your home only if you need to get medical care. But call the doctor's office first so they know you're coming. And wear a face cover. ? Wear the face cover whenever you're around other people. It can help stop the spread of the virus when you cough or sneeze. ? Limit contact with pets and people in your home. If possible, stay in a separate bedroom and use a separate bathroom. ? Clean and disinfect your home every day. Use household  and disinfectant wipes or sprays. Take special care to clean things that you grab with your hands. These include doorknobs, remote controls, phones, and handles on your refrigerator and microwave. And don't forget countertops, tabletops, bathrooms, and computer keyboards. When should you call for help? Call 911 anytime you think you may need emergency care. For example, call if you have life-threatening symptoms, such as:    · You have severe trouble breathing. (You can't talk at all.)     · You have constant chest pain or pressure.     · You are severely dizzy or lightheaded.     · You are confused or can't think clearly.     · Your face and lips have a blue color.     · You pass out (lose consciousness) or are very hard to wake up. Call your doctor now or seek immediate medical care if:    · You have moderate trouble breathing. (You can't speak a full sentence.)     · You are coughing up blood (more than about 1 teaspoon).     · You have signs of low blood pressure. These include feeling lightheaded; being too weak to stand; and having cold, pale, clammy skin. Watch closely for changes in your health, and be sure to contact your doctor if:    · Your symptoms get worse.     · You are not getting better as expected. Call before you go to the doctor's office. Follow their instructions. And wear a cloth face cover. Current as of: December 18, 2020               Content Version: 12.7  © 9554-5135 [employer]. Care instructions adapted under license by [facility] ([facility]). If you have questions about a medical condition or this instruction, always ask your healthcare professional. [place] [address] any warranty or liability for your use of this information. Patient Education        Coronavirus (YUUSC-92): Care Instructions  Overview  The coronavirus disease (COVID-19) is caused by a virus. Symptoms may include a fever, a cough, and shortness of breath. It mainly spreads person-to-person through droplets from coughing and sneezing. The virus also can spread when people are in close contact with someone who is infected. Most people have mild symptoms and can take care of themselves at home. If their symptoms get worse, they may need care in a hospital. Treatment may include medicines to reduce symptoms, plus breathing support such as oxygen therapy or a ventilator. It's important to not spread the virus to others. If you have COVID-19, wear a face cover anytime you are around other people. You need to isolate yourself while you are sick. Leave your home only if you need to get medical care or testing. Follow-up care is a key part of your treatment and safety. Be sure to make and go to all appointments, and call your doctor if you are having problems. It's also a good idea to know your test results and keep a list of the medicines you take. How can you care for yourself at home? · Get extra rest. It can help you feel better. · Drink plenty of fluids. This helps replace fluids lost from fever. Fluids also help ease a scratchy throat. Water, soup, fruit juice, and hot tea with lemon are good choices. · Take acetaminophen (such as Tylenol) to reduce a fever. It may also help with muscle aches. Read and follow all instructions on the label. · Use petroleum jelly on sore skin. This can help if the skin around your nose and lips becomes sore from rubbing a lot with tissues.   Tips for self-isolation · Limit contact with people in your home. If possible, stay in a separate bedroom and use a separate bathroom. · Wear a cloth face cover when you are around other people. It can help stop the spread of the virus when you cough or sneeze. · If you have to leave home, avoid crowds and try to stay at least 6 feet away from other people. · Avoid contact with pets and other animals. · Cover your mouth and nose with a tissue when you cough or sneeze. Then throw it in the trash right away. · Wash your hands often, especially after you cough or sneeze. Use soap and water, and scrub for at least 20 seconds. If soap and water aren't available, use an alcohol-based hand . · Don't share personal household items. These include bedding, towels, cups and glasses, and eating utensils. · [address] in the warmest water allowed for the fabric type, and dry it completely. It's okay to wash other people's laundry with yours. · Clean and disinfect your home every day. Use household  and disinfectant wipes or sprays. Take special care to clean things that you grab with your hands. These include doorknobs, remote controls, phones, and handles on your refrigerator and microwave. And don't forget countertops, tabletops, bathrooms, and computer keyboards. When you can end self-isolation  · If you know or suspect that you have COVID-19, stay in self-isolation until:  ? You haven't had a fever for 24 hours while not taking medicines to lower the fever, and  ? Your symptoms have improved, and  ? It's been at least 10 days since your symptoms started. · Talk to your doctor about whether you also need testing, especially if you have a weakened immune system. When should you call for help? Call 911 anytime you think you may need emergency care. For example, call if you have life-threatening symptoms, such as:    · You have severe trouble breathing.  (You can't talk at all.)   · You have constant chest pain or pressure.     · You are severely dizzy or lightheaded.     · You are confused or can't think clearly.     · Your face and lips have a blue color.     · You pass out (lose consciousness) or are very hard to wake up. Call your doctor now or seek immediate medical care if:    · You have moderate trouble breathing. (You can't speak a full sentence.)     · You are coughing up blood (more than about 1 teaspoon).     · You have signs of low blood pressure. These include feeling lightheaded; being too weak to stand; and having cold, pale, clammy skin. Watch closely for changes in your health, and be sure to contact your doctor if:    · Your symptoms get worse.     · You are not getting better as expected. Call before you go to the doctor's office. Follow their instructions. And wear a cloth face cover. Current as of: December 18, 2020               Content Version: 12.7  © 2006-2021 [employer], [facility]. Care instructions adapted under license by [facility] ([facility]). If you have questions about a medical condition or this instruction, always ask your healthcare professional. [place] [address] any warranty or liability for your use of this information.

## 2021-02-21 LAB
SARS-COV-2: NORMAL
SARS-COV-2: NOT DETECTED
SOURCE: NORMAL

## 2021-03-08 RX ORDER — PROPRANOLOL HCL 60 MG
CAPSULE, EXTENDED RELEASE 24HR ORAL
Qty: 30 CAPSULE | Refills: 5 | Status: SHIPPED | OUTPATIENT
Start: 2021-03-08 | End: 2021-09-01

## 2021-03-08 NOTE — TELEPHONE ENCOUNTER
Ajay Potts called requesting a refill of the below medication which has been pended for you:     Requested Prescriptions     Pending Prescriptions Disp Refills    propranolol (INDERAL LA) 60 MG extended release capsule [Pharmacy Med Name: PROPRANOLOL ER 60 MG CAPSULE] 30 capsule 3     Sig: take 1 capsule by mouth once daily       Last Appointment Date: 11/17/2020  Next Appointment Date: 5/18/2021    Allergies   Allergen Reactions    Codeine Other (See Comments)     Gives her a headache    Celebrex [Celecoxib] Rash     Whole body

## 2021-03-09 ENCOUNTER — OFFICE VISIT (OUTPATIENT)
Dept: PRIMARY CARE CLINIC | Age: 71
End: 2021-03-09
Payer: MEDICARE

## 2021-03-09 VITALS
BODY MASS INDEX: 34.46 KG/M2 | RESPIRATION RATE: 18 BRPM | DIASTOLIC BLOOD PRESSURE: 74 MMHG | WEIGHT: 227.4 LBS | TEMPERATURE: 97.2 F | HEIGHT: 68 IN | SYSTOLIC BLOOD PRESSURE: 124 MMHG | HEART RATE: 55 BPM | OXYGEN SATURATION: 96 %

## 2021-03-09 DIAGNOSIS — H66.91 RIGHT ACUTE OTITIS MEDIA: Primary | ICD-10-CM

## 2021-03-09 PROCEDURE — G8399 PT W/DXA RESULTS DOCUMENT: HCPCS | Performed by: NURSE PRACTITIONER

## 2021-03-09 PROCEDURE — G8417 CALC BMI ABV UP PARAM F/U: HCPCS | Performed by: NURSE PRACTITIONER

## 2021-03-09 PROCEDURE — G8484 FLU IMMUNIZE NO ADMIN: HCPCS | Performed by: NURSE PRACTITIONER

## 2021-03-09 PROCEDURE — 99213 OFFICE O/P EST LOW 20 MIN: CPT

## 2021-03-09 PROCEDURE — G8427 DOCREV CUR MEDS BY ELIG CLIN: HCPCS | Performed by: NURSE PRACTITIONER

## 2021-03-09 PROCEDURE — 3017F COLORECTAL CA SCREEN DOC REV: CPT | Performed by: NURSE PRACTITIONER

## 2021-03-09 PROCEDURE — 4040F PNEUMOC VAC/ADMIN/RCVD: CPT | Performed by: NURSE PRACTITIONER

## 2021-03-09 PROCEDURE — 1036F TOBACCO NON-USER: CPT | Performed by: NURSE PRACTITIONER

## 2021-03-09 PROCEDURE — 1123F ACP DISCUSS/DSCN MKR DOCD: CPT | Performed by: NURSE PRACTITIONER

## 2021-03-09 PROCEDURE — 99213 OFFICE O/P EST LOW 20 MIN: CPT | Performed by: NURSE PRACTITIONER

## 2021-03-09 PROCEDURE — 1090F PRES/ABSN URINE INCON ASSESS: CPT | Performed by: NURSE PRACTITIONER

## 2021-03-09 RX ORDER — CEFDINIR 300 MG/1
300 CAPSULE ORAL 2 TIMES DAILY
Qty: 20 CAPSULE | Refills: 0 | Status: SHIPPED | OUTPATIENT
Start: 2021-03-09 | End: 2021-03-19

## 2021-03-09 ASSESSMENT — ENCOUNTER SYMPTOMS
SORE THROAT: 1
RHINORRHEA: 1
SHORTNESS OF BREATH: 0
COUGH: 0
WHEEZING: 0

## 2021-03-09 NOTE — PROGRESS NOTES
[facility] [address] [facility] [place] [address]  Dept: [phone]  Dept Fax: [phone]  Loc: [phone]        CHIEF COMPLAINT       Chief Complaint   Patient presents with    Otalgia     sinus draingage with ST sx started a few weeks ago       Nurses Notes reviewed and I agree except as noted in the HPI. HISTORY OF PRESENT ILLNESS   Renzo Casey is a 69 y.o. female who presents to [facility] today (3/9/2021) for evaluation of:   Pt presents for evaluation of bilateral ear pain, right ear worse than left. Pt was seen 2/19/21 in  for left AOM, treated with augmentin, and tested for covid, which was negative. Pt also c/o occasional sinus congestion, drainage, and \"scratchy throat\" but states she has chronic allergies and attributes these symptoms to that. Pt denies any fever, SOB, cough or purulent drainage from nose at this time. Otalgia   There is pain in both (right ear pain onset last week, left pain returned today) ears. This is a recurrent problem. The current episode started 1 to 4 weeks ago. The problem occurs constantly. The problem has been unchanged. There has been no fever. The pain is moderate. Associated symptoms include rhinorrhea and a sore throat (\"not sore, scratchy\"). Pertinent negatives include no coughing, ear discharge or hearing loss. She has tried antibiotics for the symptoms. The treatment provided no relief. Her past medical history is significant for a chronic ear infection. REVIEW OF SYSTEMS     Review of Systems   Constitutional: Negative for fever. HENT: Positive for congestion (occasional), ear pain, rhinorrhea and sore throat (\"not sore, scratchy\"). Negative for ear discharge and hearing loss. Respiratory: Negative for cough, shortness of breath and wheezing. Cardiovascular: Negative for chest pain.        PAST MEDICAL HISTORY         Diagnosis Date    Allergic rhinitis     Anxiety     Asthma     Bronchitis     Chest pain     Chronic back pain     Depression     Fibromyalgia     GERD (gastroesophageal reflux disease)     Headache(784.0)     Heart palpitations     Irregular bowel habits     Macular degeneration     beginning    Measles     Osteoarthritis     Restless legs syndrome     Tachycardia     Unspecified sleep apnea     Wet senile macular degeneration (HCC)     bilateral- Sees a retinal Dr Wayne Santillan     Patient  has a past surgical history that includes knee surgery; Knee arthroscopy; Tubal ligation; Tonsillectomy; sigmoidoscopy; eye surgery; sinus surgery; Colonoscopy; knee surgery (Left, 11/2014); Dilation and curettage of uterus (N/A, 03/12/2019); and Dilation and curettage of uterus (N/A, 3/12/2019).     CURRENT MEDICATIONS       Outpatient Medications Prior to Visit   Medication Sig Dispense Refill    propranolol (INDERAL LA) 60 MG extended release capsule take 1 capsule by mouth once daily 30 capsule 5    hydrOXYzine (ATARAX) 25 MG tablet Take 1 tablet by mouth nightly as needed (intersitial cystitis) 90 tablet 1    montelukast (SINGULAIR) 10 MG tablet take 1 tablet by mouth every evening 90 tablet 1    cholestyramine (QUESTRAN) 4 g packet Take 1 packet by mouth daily 30 packet 3    buPROPion (WELLBUTRIN XL) 150 MG extended release tablet Take 1 tablet by mouth every morning 90 tablet 3    triamcinolone (KENALOG) 0.025 % cream Apply Topically daily as needed for itching 80 g 1    pantoprazole (PROTONIX) 40 MG tablet take 1 tablet by mouth once daily 30 tablet 5    NONFORMULARY Take 1 capsule by mouth daily Cholesterol 360      Multiple Vitamins-Minerals (PRESERVISION AREDS 2) CHEW Take 1 each by mouth 2 times daily      VITAMIN E PO Take 1 capsule by mouth daily      Ascorbic Acid (VITAMIN C PO) Take 1 tablet by mouth daily      Cholecalciferol (VITAMIN D3 PO) Take 1 tablet by mouth daily      BIOTIN PO Take 1 tablet by mouth daily      FLUoxetine (PROZAC) 40 MG capsule take 1 capsule by mouth once daily 90 capsule 3    albuterol (PROVENTIL) (2.5 MG/3ML) 0.083% nebulizer solution Take 3 mLs by nebulization every 4 hours as needed for Wheezing or Shortness of Breath 25 each 3    Nebulizers (COMPRESSOR/NEBULIZER) MISC Use as directed 1 each 3    CALCIUM-MAGNESIUM-VITAMIN D PO Take by mouth      pseudoephedrine (SUDAFED 12 HR) 120 MG extended release tablet Take 120 mg by mouth every 12 hours      aspirin-acetaminophen-caffeine (EXCEDRIN MIGRAINE) 250-250-65 MG per tablet Take 1 tablet by mouth as needed for Headaches      fluticasone (FLONASE) 50 MCG/ACT nasal spray by Nasal route      ibuprofen (ADVIL;MOTRIN) 800 MG tablet Take 1 tablet by mouth every 8 hours as needed for Pain 30 tablet 0     No facility-administered medications prior to visit. ALLERGIES     Patient is is allergic to codeine and celebrex [celecoxib]. FAMILY HISTORY     Patient's family history includes Breast Cancer in her sister; Cancer in her father and paternal grandmother; Heart Disease in her father and paternal grandmother; Other in her daughter; Vision Loss in her mother. SOCIAL HISTORY     Patient  reports that she quit smoking about 27 years ago. Her smoking use included cigarettes. She started smoking about 45 years ago. She has a 22.00 pack-year smoking history. She has never used smokeless tobacco. She reports current alcohol use of about 2.0 standard drinks of alcohol per week. She reports that she does not use drugs. PHYSICAL EXAM     VITALS  BP: 124/74, Temp: 97.2 °F (36.2 °C), Pulse: 55, Resp: 18, SpO2: 96 %  Physical Exam  Vitals signs reviewed. Constitutional:       Appearance: Normal appearance. HENT:      Right Ear: Ear canal normal. Tympanic membrane is erythematous and bulging. Left Ear: Ear canal normal. Tympanic membrane is not erythematous, retracted or bulging.       Nose: Nose normal. No mucosal edema, congestion or rhinorrhea. Mouth/Throat:      Lips: Pink. Mouth: Mucous membranes are moist.      Pharynx: Oropharynx is clear. Uvula midline. No oropharyngeal exudate or posterior oropharyngeal erythema. Tonsils: No tonsillar exudate. Neck:      Musculoskeletal: Normal range of motion and neck supple. Cardiovascular:      Rate and Rhythm: Normal rate and regular rhythm. Heart sounds: Normal heart sounds. Pulmonary:      Effort: Pulmonary effort is normal. No respiratory distress. Breath sounds: Normal breath sounds. No wheezing or rhonchi. Musculoskeletal: Normal range of motion. Lymphadenopathy:      Cervical: No cervical adenopathy. Skin:     General: Skin is warm and dry. Capillary Refill: Capillary refill takes less than 2 seconds. Neurological:      General: No focal deficit present. Mental Status: She is alert. DIAGNOSTIC RESULTS   Labs:No results found for this visit on 03/09/21. IMAGING:        CLINICAL COURSE:     Vitals:    03/09/21 1303   BP: 124/74   Site: Left Upper Arm   Position: Sitting   Cuff Size: Medium Adult   Pulse: 55   Resp: 18   Temp: 97.2 °F (36.2 °C)   TempSrc: Temporal   SpO2: 96%   Weight: 227 lb 6.4 oz (103.1 kg)   Height: 5' 8\" (1.727 m)           PROCEDURES:  None  FINAL IMPRESSION      1. Right acute otitis media         DISPOSITION/PLAN     Patient Instructions     Will send in cefdinir for right ear infection. Tale the full course of antibiotic even if you are feeling better. May use tylenol for pain if needed or ibuprofen sparingly. Warm compresses to ear can be soothing also. Increase your fluids to help thin sinus secretions and chronic sinus congestion can precipitate ear infections. If symptoms are not improving or they worsen, please return to clinic or see PCP for further evaluation.       Patient Education        Ear Infection (Otitis Media): Care Instructions  Overview     An ear infection may start with a cold and affect the middle ear (otitis media). It can hurt a lot. Most ear infections clear up on their own in a couple of days and do not need antibiotics. Also, antibiotics do not work against viruses, which may be the cause of your infection. Regular doses of pain relievers are the best way to reduce your fever and help you feel better. Follow-up care is a key part of your treatment and safety. Be sure to make and go to all appointments, and call your doctor if you are having problems. It's also a good idea to know your test results and keep a list of the medicines you take. How can you care for yourself at home? · Take pain medicines exactly as directed. ? If the doctor gave you a prescription medicine for pain, take it as prescribed. ? If you are not taking a prescription pain medicine, take an over-the-counter medicine, such as acetaminophen (Tylenol), ibuprofen (Advil, Motrin), or naproxen (Aleve). Read and follow all instructions on the label. ? Do not take two or more pain medicines at the same time unless the doctor told you to. Many pain medicines have acetaminophen, which is Tylenol. Too much acetaminophen (Tylenol) can be harmful. · Plan to take a full dose of pain reliever before bedtime. Getting enough sleep will help you get better. · Try a warm, moist washcloth on the ear. It may help relieve pain. · If your doctor prescribed antibiotics, take them as directed. Do not stop taking them just because you feel better. You need to take the full course of antibiotics. When should you call for help? Call your doctor now or seek immediate medical care if:    · You have new or increasing ear pain.     · You have new or increasing pus or blood draining from your ear.     · You have a fever with a stiff neck or a severe headache.    Watch closely for changes in your health, and be sure to contact your doctor if:    · You have new or worse symptoms.     · You are not getting better after taking an antibiotic for 2 days. Where can you learn more? Go to https://chpepiceweb.NeoScale [email or web address] org and sign in to your KnewCoin account. Enter F382 in the [facility] box to learn more about \"Ear Infection (Otitis Media): Care Instructions. \"     If you do not have an account, please click on the \"Sign Up Now\" link. Current as of: April 15, 2020               Content Version: 12.6  © 2006-2020 [employer]. Care instructions adapted under license by [facility] ([facility]). If you have questions about a medical condition or this instruction, always ask your healthcare professional. [place] [address] any warranty or liability for your use of this information. Patient Education        Keeping Ears Dry: Care Instructions  Your Care Instructions  Your doctor wants you to keep water from getting into your ears. You may need to do this because of a ruptured eardrum, an ear infection, or other ear problems. Follow-up care is a key part of your treatment and safety. Be sure to make and go to all appointments, and call your doctor if you are having problems. It's also a good idea to know your test results and keep a list of the medicines you take. How can you care for yourself at home? · Take baths until your doctor says you can take showers again. Avoid getting water in the ear until after the problem clears up. Ask your doctor if you should use earplugs to keep water out of your ears. · Do not swim until your doctor says you can. · If you get water in your ears, turn your head to each side and pull the earlobe in different directions. This will help the water run out. If your ears are still wet, use a hair dryer set on the lowest heat. Hold the dryer several inches from your ear. · Use your medicines exactly as prescribed. Call your doctor if you think you are having a problem with your medicine. Do not put drops in your ears unless your doctor prescribes them.   When should you call for help? Watch closely for changes in your health, and be sure to contact your doctor if you have any problems. Where can you learn more? Go to https://chpepiceweb.Zimbra. org and sign in to your Thinkature account. Enter C776 in the [facility] box to learn more about \"Keeping Ears Dry: Care Instructions. \"     If you do not have an account, please click on the \"Sign Up Now\" link. Current as of: April 15, 2020               Content Version: 12.6  © 9022-2932 [employer]. Care instructions adapted under license by [facility] ([facility]). If you have questions about a medical condition or this instruction, always ask your healthcare professional. [address] any warranty or liability for your use of this information. No orders of the defined types were placed in this encounter.     Outpatient Encounter Medications as of 3/9/2021   Medication Sig Dispense Refill    cefdinir (OMNICEF) 300 MG capsule Take 1 capsule by mouth 2 times daily for 10 days 20 capsule 0    propranolol (INDERAL LA) 60 MG extended release capsule take 1 capsule by mouth once daily 30 capsule 5    hydrOXYzine (ATARAX) 25 MG tablet Take 1 tablet by mouth nightly as needed (intersitial cystitis) 90 tablet 1    montelukast (SINGULAIR) 10 MG tablet take 1 tablet by mouth every evening 90 tablet 1    cholestyramine (QUESTRAN) 4 g packet Take 1 packet by mouth daily 30 packet 3    buPROPion (WELLBUTRIN XL) 150 MG extended release tablet Take 1 tablet by mouth every morning 90 tablet 3    triamcinolone (KENALOG) 0.025 % cream Apply Topically daily as needed for itching 80 g 1    pantoprazole (PROTONIX) 40 MG tablet take 1 tablet by mouth once daily 30 tablet 5    NONFORMULARY Take 1 capsule by mouth daily Cholesterol 360      Multiple Vitamins-Minerals (PRESERVISION AREDS 2) CHEW Take 1 each by mouth 2 times daily      VITAMIN E PO Take 1 capsule by mouth daily      Ascorbic Acid (VITAMIN C PO) Take 1 tablet by mouth daily      Cholecalciferol (VITAMIN D3 PO) Take 1 tablet by mouth daily      BIOTIN PO Take 1 tablet by mouth daily      FLUoxetine (PROZAC) 40 MG capsule take 1 capsule by mouth once daily 90 capsule 3    albuterol (PROVENTIL) (2.5 MG/3ML) 0.083% nebulizer solution Take 3 mLs by nebulization every 4 hours as needed for Wheezing or Shortness of Breath 25 each 3    Nebulizers (COMPRESSOR/NEBULIZER) MISC Use as directed 1 each 3    CALCIUM-MAGNESIUM-VITAMIN D PO Take by mouth      pseudoephedrine (SUDAFED 12 HR) 120 MG extended release tablet Take 120 mg by mouth every 12 hours      aspirin-acetaminophen-caffeine (EXCEDRIN MIGRAINE) 250-250-65 MG per tablet Take 1 tablet by mouth as needed for Headaches      fluticasone (FLONASE) 50 MCG/ACT nasal spray by Nasal route      ibuprofen (ADVIL;MOTRIN) 800 MG tablet Take 1 tablet by mouth every 8 hours as needed for Pain 30 tablet 0     No facility-administered encounter medications on file as of 3/9/2021. Return if symptoms worsen or fail to improve.                 Electronically signed by LETITIA Holliday CNP on 3/9/2021 at 3:02 PM

## 2021-03-09 NOTE — PATIENT INSTRUCTIONS
Will send in cefdinir for right ear infection. Tale the full course of antibiotic even if you are feeling better. May use tylenol for pain if needed or ibuprofen sparingly. Warm compresses to ear can be soothing also. Increase your fluids to help thin sinus secretions and chronic sinus congestion can precipitate ear infections. If symptoms are not improving or they worsen, please return to clinic or see PCP for further evaluation. Patient Education        Ear Infection (Otitis Media): Care Instructions  Overview     An ear infection may start with a cold and affect the middle ear (otitis media). It can hurt a lot. Most ear infections clear up on their own in a couple of days and do not need antibiotics. Also, antibiotics do not work against viruses, which may be the cause of your infection. Regular doses of pain relievers are the best way to reduce your fever and help you feel better. Follow-up care is a key part of your treatment and safety. Be sure to make and go to all appointments, and call your doctor if you are having problems. It's also a good idea to know your test results and keep a list of the medicines you take. How can you care for yourself at home? · Take pain medicines exactly as directed. ? If the doctor gave you a prescription medicine for pain, take it as prescribed. ? If you are not taking a prescription pain medicine, take an over-the-counter medicine, such as acetaminophen (Tylenol), ibuprofen (Advil, Motrin), or naproxen (Aleve). Read and follow all instructions on the label. ? Do not take two or more pain medicines at the same time unless the doctor told you to. Many pain medicines have acetaminophen, which is Tylenol. Too much acetaminophen (Tylenol) can be harmful. · Plan to take a full dose of pain reliever before bedtime. Getting enough sleep will help you get better. · Try a warm, moist washcloth on the ear. It may help relieve pain.   · If your doctor prescribed antibiotics, take them as directed. Do not stop taking them just because you feel better. You need to take the full course of antibiotics. When should you call for help? Call your doctor now or seek immediate medical care if:    · You have new or increasing ear pain.     · You have new or increasing pus or blood draining from your ear.     · You have a fever with a stiff neck or a severe headache. Watch closely for changes in your health, and be sure to contact your doctor if:    · You have new or worse symptoms.     · You are not getting better after taking an antibiotic for 2 days. Where can you learn more? Go to https://chpepiceweb.OANDA. org and sign in to your Inogen account. Enter K701 in the [employer]) box to learn more about \"Ear Infection (Otitis Media): Care Instructions. \"     If you do not have an account, please click on the \"Sign Up Now\" link. Current as of: April 15, 2020               Content Version: 12.6  © 2006-2020 [employer]. Care instructions adapted under license by [facility] ([facility]). If you have questions about a medical condition or this instruction, always ask your healthcare professional. [place] [address] any warranty or liability for your use of this information. Patient Education        Keeping Ears Dry: Care Instructions  Your Care Instructions  Your doctor wants you to keep water from getting into your ears. You may need to do this because of a ruptured eardrum, an ear infection, or other ear problems. Follow-up care is a key part of your treatment and safety. Be sure to make and go to all appointments, and call your doctor if you are having problems. It's also a good idea to know your test results and keep a list of the medicines you take. How can you care for yourself at home? · Take baths until your doctor says you can take showers again. Avoid getting water in the ear until after the problem clears up.  Ask your doctor if you should use earplugs to keep water out of your ears. · Do not swim until your doctor says you can. · If you get water in your ears, turn your head to each side and pull the earlobe in different directions. This will help the water run out. If your ears are still wet, use a hair dryer set on the lowest heat. Hold the dryer several inches from your ear. · Use your medicines exactly as prescribed. Call your doctor if you think you are having a problem with your medicine. Do not put drops in your ears unless your doctor prescribes them. When should you call for help? Watch closely for changes in your health, and be sure to contact your doctor if you have any problems. Where can you learn more? Go to https://Needish.Kopjra. org and sign in to your [employer] account. Enter X358 in the [employer] box to learn more about \"Keeping Ears Dry: Care Instructions. \"     If you do not have an account, please click on the \"Sign Up Now\" link. Current as of: April 15, 2020               Content Version: 12.6  © 6926-7414 [employer]. Care instructions adapted under license by [employer]. If you have questions about a medical condition or this instruction, always ask your healthcare professional. [address] any warranty or liability for your use of this information.

## 2021-04-09 ENCOUNTER — HOSPITAL ENCOUNTER (OUTPATIENT)
Dept: LAB | Age: 71
Discharge: HOME | End: 2021-04-09
Payer: MEDICARE

## 2021-04-09 ENCOUNTER — OFFICE VISIT (OUTPATIENT)
Dept: PRIMARY CARE CLINIC | Age: 71
End: 2021-04-09
Payer: MEDICARE

## 2021-04-09 VITALS
TEMPERATURE: 98.3 F | BODY MASS INDEX: 34.1 KG/M2 | WEIGHT: 225 LBS | HEIGHT: 68 IN | OXYGEN SATURATION: 98 % | DIASTOLIC BLOOD PRESSURE: 62 MMHG | HEART RATE: 58 BPM | SYSTOLIC BLOOD PRESSURE: 130 MMHG

## 2021-04-09 DIAGNOSIS — R30.0 DYSURIA: ICD-10-CM

## 2021-04-09 DIAGNOSIS — H93.8X3 SENSATION OF FULLNESS IN BOTH EARS: ICD-10-CM

## 2021-04-09 DIAGNOSIS — N30.00 ACUTE CYSTITIS WITHOUT HEMATURIA: Primary | ICD-10-CM

## 2021-04-09 DIAGNOSIS — R30.0 DYSURIA: Primary | ICD-10-CM

## 2021-04-09 LAB
-: ABNORMAL
AMORPHOUS: ABNORMAL
BACTERIA: ABNORMAL
BILIRUBIN URINE: NEGATIVE
CASTS UA: ABNORMAL /LPF (ref 0–2)
COLOR: ABNORMAL
COMMENT UA: ABNORMAL
CRYSTALS, UA: ABNORMAL /HPF
EPITHELIAL CELLS UA: ABNORMAL /HPF (ref 0–5)
GLUCOSE URINE: NEGATIVE
KETONES, URINE: NEGATIVE
LEUKOCYTE ESTERASE, URINE: ABNORMAL
MUCUS: ABNORMAL
NITRITE, URINE: POSITIVE
OTHER OBSERVATIONS UA: ABNORMAL
PH UA: 6 (ref 5–6)
PROTEIN UA: NEGATIVE
RBC UA: ABNORMAL /HPF (ref 0–4)
RENAL EPITHELIAL, UA: ABNORMAL /HPF
SPECIFIC GRAVITY UA: 1 (ref 1.01–1.02)
TRICHOMONAS: ABNORMAL
TURBIDITY: ABNORMAL
URINE HGB: ABNORMAL
UROBILINOGEN, URINE: NORMAL
WBC UA: ABNORMAL /HPF (ref 0–4)
YEAST: ABNORMAL

## 2021-04-09 PROCEDURE — 99214 OFFICE O/P EST MOD 30 MIN: CPT | Performed by: FAMILY MEDICINE

## 2021-04-09 PROCEDURE — 87086 URINE CULTURE/COLONY COUNT: CPT

## 2021-04-09 PROCEDURE — 99213 OFFICE O/P EST LOW 20 MIN: CPT

## 2021-04-09 PROCEDURE — 81001 URINALYSIS AUTO W/SCOPE: CPT

## 2021-04-09 PROCEDURE — G8417 CALC BMI ABV UP PARAM F/U: HCPCS | Performed by: FAMILY MEDICINE

## 2021-04-09 PROCEDURE — 4040F PNEUMOC VAC/ADMIN/RCVD: CPT | Performed by: FAMILY MEDICINE

## 2021-04-09 PROCEDURE — 1090F PRES/ABSN URINE INCON ASSESS: CPT | Performed by: FAMILY MEDICINE

## 2021-04-09 PROCEDURE — 1036F TOBACCO NON-USER: CPT | Performed by: FAMILY MEDICINE

## 2021-04-09 PROCEDURE — G8427 DOCREV CUR MEDS BY ELIG CLIN: HCPCS | Performed by: FAMILY MEDICINE

## 2021-04-09 PROCEDURE — 3017F COLORECTAL CA SCREEN DOC REV: CPT | Performed by: FAMILY MEDICINE

## 2021-04-09 PROCEDURE — 1123F ACP DISCUSS/DSCN MKR DOCD: CPT | Performed by: FAMILY MEDICINE

## 2021-04-09 PROCEDURE — G8399 PT W/DXA RESULTS DOCUMENT: HCPCS | Performed by: FAMILY MEDICINE

## 2021-04-09 RX ORDER — FLUCONAZOLE 150 MG/1
TABLET ORAL
Qty: 2 TABLET | Refills: 0 | Status: SHIPPED | OUTPATIENT
Start: 2021-04-09 | End: 2021-05-18 | Stop reason: ALTCHOICE

## 2021-04-09 RX ORDER — PREDNISONE 20 MG/1
20 TABLET ORAL DAILY
Qty: 5 TABLET | Refills: 0 | Status: SHIPPED | OUTPATIENT
Start: 2021-04-09 | End: 2021-04-14

## 2021-04-09 RX ORDER — CEPHALEXIN 500 MG/1
500 CAPSULE ORAL 3 TIMES DAILY
Qty: 21 CAPSULE | Refills: 0 | Status: SHIPPED | OUTPATIENT
Start: 2021-04-09 | End: 2021-04-16

## 2021-04-09 ASSESSMENT — ENCOUNTER SYMPTOMS
ABDOMINAL PAIN: 0
BACK PAIN: 0
RHINORRHEA: 0
VOMITING: 0
COUGH: 0
DIARRHEA: 0
NAUSEA: 1
SHORTNESS OF BREATH: 0
SORE THROAT: 0

## 2021-04-09 NOTE — PROGRESS NOTES
[address] [place] [address]  Dept: [phone]  Dept Fax: [phone]  Loc: [phone]    Fede Owens is a 69 y.o. female who presents today for her medical conditions/complaints as noted below. Fede Owens is c/o of   Chief Complaint   Patient presents with    Dysuria     with incontinence       HPI:     Here today for dysuria and ear fullness. Dysuria   This is a new problem. The current episode started in the past 7 days (3 days). The problem occurs every urination. The problem has been unchanged. The quality of the pain is described as burning. The pain is at a severity of 4/10. The pain is moderate. There has been no fever. She is not sexually active. There is no history of pyelonephritis. Associated symptoms include frequency, nausea and urgency. Pertinent negatives include no chills, flank pain, hematuria, hesitancy, possible pregnancy or vomiting. Associated symptoms comments: incontinence . Treatments tried: azo. The treatment provided no relief. There is no history of recurrent UTIs. Ear Fullness   There is pain in both (right is worse) ears. This is a recurrent problem. The current episode started 1 to 4 weeks ago (1 month). The problem occurs constantly. The problem has been unchanged. There has been no fever. The patient is experiencing no pain. Associated symptoms include hearing loss. Pertinent negatives include no abdominal pain, coughing, diarrhea, headaches, rash, rhinorrhea, sore throat or vomiting. She has tried antibiotics for the symptoms. The treatment provided no relief.          Past Medical History:   Diagnosis Date    Allergic rhinitis     Anxiety     Asthma     Bronchitis     Chest pain     Chronic back pain     Depression     Fibromyalgia     GERD (gastroesophageal reflux disease)     Headache(784.0)     Heart palpitations     Irregular bowel habits     Macular degeneration     beginning    Measles     Osteoarthritis     Restless legs syndrome     Tachycardia     Unspecified sleep apnea     Wet senile macular degeneration (HCC)     bilateral- Sees a retinal Dr Taco Granados          Social History     Tobacco Use    Smoking status: Former Smoker     Packs/day: 1.00     Years: 22.00     Pack years: 22.00     Types: Cigarettes     Start date: 1975     Quit date: 1993     Years since quittin.4    Smokeless tobacco: Never Used   Substance Use Topics    Alcohol use: Yes     Alcohol/week: 2.0 standard drinks     Types: 2 Glasses of wine per week     Current Outpatient Medications   Medication Sig Dispense Refill    predniSONE (DELTASONE) 20 MG tablet Take 1 tablet by mouth daily for 5 days 5 tablet 0    cephALEXin (KEFLEX) 500 MG capsule Take 1 capsule by mouth 3 times daily for 7 days 21 capsule 0    fluconazole (DIFLUCAN) 150 MG tablet Take 1 pill now, 1 pill in a week.  2 tablet 0    propranolol (INDERAL LA) 60 MG extended release capsule take 1 capsule by mouth once daily 30 capsule 5    hydrOXYzine (ATARAX) 25 MG tablet Take 1 tablet by mouth nightly as needed (intersitial cystitis) 90 tablet 1    montelukast (SINGULAIR) 10 MG tablet take 1 tablet by mouth every evening 90 tablet 1    cholestyramine (QUESTRAN) 4 g packet Take 1 packet by mouth daily 30 packet 3    buPROPion (WELLBUTRIN XL) 150 MG extended release tablet Take 1 tablet by mouth every morning 90 tablet 3    triamcinolone (KENALOG) 0.025 % cream Apply Topically daily as needed for itching 80 g 1    pantoprazole (PROTONIX) 40 MG tablet take 1 tablet by mouth once daily 30 tablet 5    NONFORMULARY Take 1 capsule by mouth daily Cholesterol 360      Multiple Vitamins-Minerals (PRESERVISION AREDS 2) CHEW Take 1 each by mouth 2 times daily      VITAMIN E PO Take 1 capsule by mouth daily      Ascorbic Acid (VITAMIN C PO) Take 1 tablet by mouth daily      04/09/2021 None  0 - 4 /HPF Final    Casts UA 04/09/2021 NOT REPORTED  0 - 2 /LPF Final    Crystals, UA 04/09/2021 NOT REPORTED  None /HPF Final    Epithelial Cells UA 04/09/2021 0 TO 4  0 - 5 /HPF Final    Renal Epithelial, UA 04/09/2021 NOT REPORTED  0 /HPF Final    Bacteria, UA 04/09/2021 TRACE* None Final    Mucus, UA 04/09/2021 NOT REPORTED  None Final    Trichomonas, UA 04/09/2021 NOT REPORTED  None Final    Amorphous, UA 04/09/2021 NOT REPORTED  None Final    Other Observations UA 04/09/2021 Specimen Cultured* NOT REQ. Final    Yeast, UA 04/09/2021 NOT REPORTED  None Final            Plan:        UTI: new; her UA shows she has a UTI. I will treat with keflex for 7 days and follow up on the culture. If the culture shows resistance to keflex then she will be notified and her antibiotic will be changed. I encouraged her to use tylenol or ibuprofen for pain and she can continue the pyridium if it is helping. she was told to return to the clinic or ER if symptoms worsen, if she develops fevers or severe back pain. she understood and agreed with the plan. Ear fullness: worsening; likely eustachian tube dysfunction. I recommended she continue flonase and I will treat with prednisone. Return if symptoms worsen or fail to improve. Orders Placed This Encounter   Medications    predniSONE (DELTASONE) 20 MG tablet     Sig: Take 1 tablet by mouth daily for 5 days     Dispense:  5 tablet     Refill:  0    cephALEXin (KEFLEX) 500 MG capsule     Sig: Take 1 capsule by mouth 3 times daily for 7 days     Dispense:  21 capsule     Refill:  0    fluconazole (DIFLUCAN) 150 MG tablet     Sig: Take 1 pill now, 1 pill in a week. Dispense:  2 tablet     Refill:  0       Patientgiven educational materials - see patient instructions. Discussed use, benefit,and side effects of prescribed medications. All patient questions answered. Ptvoiced understanding. Reviewed health maintenance.   Instructed to continue currentmedications, diet and exercise. Patient agreed with treatment plan. Follow up asdirected.      Electronically signed by Frank Agarwal MD on 4/9/2021 at 4:23 PM

## 2021-04-11 LAB
CULTURE: NORMAL
Lab: NORMAL
SPECIMEN DESCRIPTION: NORMAL

## 2021-05-10 RX ORDER — CHOLESTYRAMINE 4 G/9G
POWDER, FOR SUSPENSION ORAL
Qty: 30 PACKET | Refills: 3 | Status: SHIPPED | OUTPATIENT
Start: 2021-05-10 | End: 2022-03-04

## 2021-05-10 NOTE — TELEPHONE ENCOUNTER
Stephanie Antonio called requesting a refill of the below medication which has been pended for you:     Requested Prescriptions     Pending Prescriptions Disp Refills    cholestyramine (QUESTRAN) 4 g packet [Pharmacy Med Name: Johnson Mann 30 packet 3     Sig: take 1 packet ( 4 grams ) by mouth once daily       Last Appointment Date: 4/9/2021  Next Appointment Date: 5/18/2021    Allergies   Allergen Reactions    Codeine Other (See Comments)     Gives her a headache    Celebrex [Celecoxib] Rash     Whole body

## 2021-05-18 ENCOUNTER — OFFICE VISIT (OUTPATIENT)
Dept: FAMILY MEDICINE CLINIC | Age: 71
End: 2021-05-18
Payer: MEDICARE

## 2021-05-18 VITALS
BODY MASS INDEX: 34.1 KG/M2 | DIASTOLIC BLOOD PRESSURE: 68 MMHG | TEMPERATURE: 97.8 F | HEART RATE: 54 BPM | OXYGEN SATURATION: 95 % | HEIGHT: 68 IN | WEIGHT: 225 LBS | SYSTOLIC BLOOD PRESSURE: 128 MMHG

## 2021-05-18 DIAGNOSIS — M62.838 TRAPEZIUS MUSCLE SPASM: ICD-10-CM

## 2021-05-18 DIAGNOSIS — I48.92 ATRIAL FLUTTER, UNSPECIFIED TYPE (HCC): ICD-10-CM

## 2021-05-18 DIAGNOSIS — Z12.31 VISIT FOR SCREENING MAMMOGRAM: ICD-10-CM

## 2021-05-18 DIAGNOSIS — K58.0 IRRITABLE BOWEL SYNDROME WITH DIARRHEA: ICD-10-CM

## 2021-05-18 DIAGNOSIS — G43.701 CHRONIC MIGRAINE WITHOUT AURA WITH STATUS MIGRAINOSUS, NOT INTRACTABLE: Primary | ICD-10-CM

## 2021-05-18 DIAGNOSIS — F33.0 MILD EPISODE OF RECURRENT MAJOR DEPRESSIVE DISORDER (HCC): ICD-10-CM

## 2021-05-18 PROCEDURE — G8399 PT W/DXA RESULTS DOCUMENT: HCPCS | Performed by: FAMILY MEDICINE

## 2021-05-18 PROCEDURE — 1036F TOBACCO NON-USER: CPT | Performed by: FAMILY MEDICINE

## 2021-05-18 PROCEDURE — G8427 DOCREV CUR MEDS BY ELIG CLIN: HCPCS | Performed by: FAMILY MEDICINE

## 2021-05-18 PROCEDURE — 20552 NJX 1/MLT TRIGGER POINT 1/2: CPT | Performed by: FAMILY MEDICINE

## 2021-05-18 PROCEDURE — 4040F PNEUMOC VAC/ADMIN/RCVD: CPT | Performed by: FAMILY MEDICINE

## 2021-05-18 PROCEDURE — 3017F COLORECTAL CA SCREEN DOC REV: CPT | Performed by: FAMILY MEDICINE

## 2021-05-18 PROCEDURE — G8417 CALC BMI ABV UP PARAM F/U: HCPCS | Performed by: FAMILY MEDICINE

## 2021-05-18 PROCEDURE — 99214 OFFICE O/P EST MOD 30 MIN: CPT | Performed by: FAMILY MEDICINE

## 2021-05-18 PROCEDURE — 1123F ACP DISCUSS/DSCN MKR DOCD: CPT | Performed by: FAMILY MEDICINE

## 2021-05-18 PROCEDURE — 1090F PRES/ABSN URINE INCON ASSESS: CPT | Performed by: FAMILY MEDICINE

## 2021-05-18 RX ORDER — TRIAMCINOLONE ACETONIDE 40 MG/ML
20 INJECTION, SUSPENSION INTRA-ARTICULAR; INTRAMUSCULAR ONCE
Status: COMPLETED | OUTPATIENT
Start: 2021-05-18 | End: 2021-05-18

## 2021-05-18 RX ADMIN — TRIAMCINOLONE ACETONIDE 20 MG: 40 INJECTION, SUSPENSION INTRA-ARTICULAR; INTRAMUSCULAR at 13:02

## 2021-05-18 RX ADMIN — TRIAMCINOLONE ACETONIDE 20 MG: 200 INJECTION, SUSPENSION INTRA-ARTICULAR; INTRAMUSCULAR at 13:03

## 2021-05-18 SDOH — ECONOMIC STABILITY: FOOD INSECURITY: WITHIN THE PAST 12 MONTHS, YOU WORRIED THAT YOUR FOOD WOULD RUN OUT BEFORE YOU GOT MONEY TO BUY MORE.: PATIENT DECLINED

## 2021-05-18 SDOH — ECONOMIC STABILITY: FOOD INSECURITY: WITHIN THE PAST 12 MONTHS, THE FOOD YOU BOUGHT JUST DIDN'T LAST AND YOU DIDN'T HAVE MONEY TO GET MORE.: PATIENT DECLINED

## 2021-05-18 ASSESSMENT — ENCOUNTER SYMPTOMS
SHORTNESS OF BREATH: 0
CHEST TIGHTNESS: 0
COUGH: 0
WHEEZING: 0

## 2021-05-18 NOTE — PROGRESS NOTES
[facility] [address] [address]  [address] [address]  Dept: [phone]  Dept Fax: [phone]  Loc: [phone]    Cayla Matos is a 69 y.o. female who presents today for her medical conditions/complaints as noted below. Cayla Matos is c/o of   Chief Complaint   Patient presents with    6 Month Follow-Up     migraines and diarrhea; better       HPI:     HPI Here today for a follow up of her migraines and her diarrhea. Migraines: improving; she is feeling better. She had a migraine last week. She is currently only having migraines every few months. She is still getting headaches but they are more of a sinus headaches. She has been having some problems with feeling off balanced. She notices this mostly when she stands up or turns too fast. She has not fallen. Diarrhea: improving; she feels like the [employer] is really helping firm up her stools. She is able to leave the house without worrying she is going to have an accident. She still has a bowel movement around the time she eats. She did have one episode of constipation but that resolved with benefiber. She is also feeling very achy and would like trigger point injections in her neck again. She doesn't have a lot that helps her pain although she does take tylenol regularly with an occasional ibuprofen.     Past Medical History:   Diagnosis Date    Allergic rhinitis     Anxiety     Asthma     Bronchitis     Chest pain     Chronic back pain     Depression     Fibromyalgia     GERD (gastroesophageal reflux disease)     Headache(784.0)     Heart palpitations     Irregular bowel habits     Macular degeneration     beginning    Measles     Osteoarthritis     Restless legs syndrome     Tachycardia     Unspecified sleep apnea     Wet senile macular degeneration (HCC)     bilateral- Sees a retinal Dr Laurie Swann 120 mg by mouth every 12 hours      aspirin-acetaminophen-caffeine (EXCEDRIN MIGRAINE) 250-250-65 MG per tablet Take 1 tablet by mouth as needed for Headaches      fluticasone (FLONASE) 50 MCG/ACT nasal spray by Nasal route      ibuprofen (ADVIL;MOTRIN) 800 MG tablet Take 1 tablet by mouth every 8 hours as needed for Pain 30 tablet 0     No current facility-administered medications for this visit. Allergies   Allergen Reactions    Codeine Other (See Comments)     Gives her a headache    Celebrex [Celecoxib] Rash     Whole body       Subjective:     Review of Systems   Constitutional: Negative for activity change, appetite change, chills, fatigue and fever. Eyes: Negative for visual disturbance. Respiratory: Negative for cough, chest tightness, shortness of breath and wheezing. Cardiovascular: Negative for chest pain, palpitations and leg swelling. Genitourinary: Negative for difficulty urinating. Neurological: Negative for dizziness, syncope, weakness, light-headedness and headaches. Psychiatric/Behavioral: Negative for behavioral problems, decreased concentration, dysphoric mood and sleep disturbance. The patient is not nervous/anxious. Objective:      Physical Exam  Vitals and nursing note reviewed. Constitutional:       General: She is not in acute distress. Appearance: She is well-developed. Eyes:      Conjunctiva/sclera: Conjunctivae normal.   Neck:      Thyroid: No thyromegaly. Cardiovascular:      Rate and Rhythm: Normal rate and regular rhythm. Heart sounds: Normal heart sounds. No murmur heard. Pulmonary:      Effort: Pulmonary effort is normal. No respiratory distress. Breath sounds: Normal breath sounds. No wheezing. Musculoskeletal:      Cervical back: Normal range of motion and neck supple. Lymphadenopathy:      Cervical: No cervical adenopathy. Skin:     General: Skin is warm and dry. Findings: No erythema or rash.    Neurological: Mental Status: She is alert and oriented to person, place, and time. Psychiatric:         Mood and Affect: Mood normal.         Behavior: Behavior normal.         Thought Content: Thought content normal.         Judgment: Judgment normal.       /68   Pulse 54   Temp 97.8 °F (36.6 °C)   Ht 5' 8\" (1.727 m)   Wt 225 lb (102.1 kg)   SpO2 95%   BMI 34.21 kg/m²     Assessment:       Diagnosis Orders   1. Chronic migraine without aura with status migrainosus, not intractable     2. Mild episode of recurrent major depressive disorder ([facility] [address]     3. Atrial flutter, unspecified type ([facility] [address]     4. Visit for screening mammogram  [facility] JUDY DIGITAL SCREEN BILATERAL   5. Irritable bowel syndrome with diarrhea     6. Trapezius muscle spasm  KS INJECT TRIGGER POINT, 1 OR 2    triamcinolone acetonide (KENALOG-40) injection 20 mg    triamcinolone acetonide (KENALOG-40) injection 20 mg             Plan:        Migraine: improving; she is doing very well with the proprnaolol so I will continue it. Diarrhea: improving; she is doing well on the questran    trapezius muscle spasm: I recommended using heat on the neck for 10 minutes three times a day and then doing her neck exercises after she uses the heat. I then told her to ice the neck afterwards. she was given both neck and shoulder exercises and trigger point injections. A trigger point injection was performed at the site of maximal tenderness using 2% plain Lidocaine and 20mg of Kenalog in each trapezius muscle. This was well tolerated, and followed by moderate relief of pain. Depression: stable; she has been doing well lately without symptoms    A flutter: stable; she sees cardio     Return in about 6 months (around 11/18/2021) for [address]     Orders Placed This Encounter   Procedures    JONATHAN JUDY DIGITAL SCREEN BILATERAL     Standing Status:   Future     Standing Expiration Date:   5/18/2022    KS INJECT TRIGGER POINT, 1 OR 2       Patientgiven educational materials - see patient instructions. Discussed use, benefit,and side effects of prescribed medications. All patient questions answered. Ptvoiced understanding. Reviewed health maintenance. Instructed to continue currentmedications, diet and exercise. Patient agreed with treatment plan. Follow up asdirected.      Electronically signed by Dorita Dang MD on 5/18/2021 at 1:03 PM

## 2021-06-07 ENCOUNTER — TELEPHONE (OUTPATIENT)
Dept: FAMILY MEDICINE CLINIC | Age: 71
End: 2021-06-07

## 2021-06-07 NOTE — TELEPHONE ENCOUNTER
Pt called in asking if she could get a referral to gyn, she stated she would like to get an appt but told her dr Octavio Ro is not in and suggested if it was a major concern to come into urgent care

## 2021-06-14 RX ORDER — PANTOPRAZOLE SODIUM 40 MG/1
TABLET, DELAYED RELEASE ORAL
Qty: 30 TABLET | Refills: 5 | Status: SHIPPED | OUTPATIENT
Start: 2021-06-14 | End: 2021-09-15 | Stop reason: SDUPTHER

## 2021-06-14 NOTE — TELEPHONE ENCOUNTER
Dalia Casey called requesting a refill of the below medication which has been pended for you:     Requested Prescriptions     Pending Prescriptions Disp Refills    pantoprazole (PROTONIX) 40 MG tablet [Pharmacy Med Name: PANTOPRAZOLE SOD DR 40 MG TAB] 30 tablet 5     Sig: take 1 tablet by mouth once daily       Last Appointment Date: 5/18/2021  Next Appointment Date: 6/29/2021    Allergies   Allergen Reactions    Codeine Other (See Comments)     Gives her a headache    Celebrex [Celecoxib] Rash     Whole body

## 2021-06-15 DIAGNOSIS — R93.89 THICKENED ENDOMETRIUM: Primary | ICD-10-CM

## 2021-06-16 ENCOUNTER — OFFICE VISIT (OUTPATIENT)
Dept: OBGYN | Age: 71
End: 2021-06-16
Payer: MEDICARE

## 2021-06-16 ENCOUNTER — HOSPITAL ENCOUNTER (OUTPATIENT)
Dept: ULTRASOUND IMAGING | Age: 71
Discharge: HOME | End: 2021-06-18
Payer: MEDICARE

## 2021-06-16 ENCOUNTER — HOSPITAL ENCOUNTER (OUTPATIENT)
Dept: MAMMOGRAPHY | Age: 71
Discharge: HOME | End: 2021-06-18
Payer: MEDICARE

## 2021-06-16 ENCOUNTER — HOSPITAL ENCOUNTER (OUTPATIENT)
Dept: LAB | Age: 71
Discharge: HOME | End: 2021-06-16
Payer: MEDICARE

## 2021-06-16 VITALS
HEIGHT: 68 IN | OXYGEN SATURATION: 95 % | BODY MASS INDEX: 33.19 KG/M2 | WEIGHT: 219 LBS | SYSTOLIC BLOOD PRESSURE: 122 MMHG | HEART RATE: 50 BPM | DIASTOLIC BLOOD PRESSURE: 70 MMHG

## 2021-06-16 DIAGNOSIS — N93.8 DYSFUNCTIONAL UTERINE BLEEDING: Primary | ICD-10-CM

## 2021-06-16 DIAGNOSIS — Z87.42 HISTORY OF VAGINAL BLEEDING: Primary | ICD-10-CM

## 2021-06-16 DIAGNOSIS — N95.0 PMB (POSTMENOPAUSAL BLEEDING): Primary | ICD-10-CM

## 2021-06-16 DIAGNOSIS — R53.83 FATIGUE, UNSPECIFIED TYPE: ICD-10-CM

## 2021-06-16 DIAGNOSIS — N95.0 PMB (POSTMENOPAUSAL BLEEDING): ICD-10-CM

## 2021-06-16 DIAGNOSIS — N93.9 VAGINAL BLEEDING: Primary | ICD-10-CM

## 2021-06-16 DIAGNOSIS — Z12.31 VISIT FOR SCREENING MAMMOGRAM: ICD-10-CM

## 2021-06-16 DIAGNOSIS — R93.89 THICKENED ENDOMETRIUM: ICD-10-CM

## 2021-06-16 LAB
ABSOLUTE EOS #: 0.11 K/UL (ref 0–0.44)
ABSOLUTE IMMATURE GRANULOCYTE: 0.07 K/UL (ref 0–0.3)
ABSOLUTE LYMPH #: 3.47 K/UL (ref 1.1–3.7)
ABSOLUTE MONO #: 0.89 K/UL (ref 0.1–1.2)
BASOPHILS # BLD: 1 % (ref 0–2)
BASOPHILS ABSOLUTE: 0.07 K/UL (ref 0–0.2)
DIFFERENTIAL TYPE: ABNORMAL
EOSINOPHILS RELATIVE PERCENT: 1 % (ref 1–4)
HCT VFR BLD CALC: 44.6 % (ref 36.3–47.1)
HEMOGLOBIN: 13.8 G/DL (ref 11.9–15.1)
IMMATURE GRANULOCYTES: 1 %
LYMPHOCYTES # BLD: 34 % (ref 24–43)
MCH RBC QN AUTO: 30.2 PG (ref 25.2–33.5)
MCHC RBC AUTO-ENTMCNC: 30.9 G/DL (ref 25.2–33.5)
MCV RBC AUTO: 97.6 FL (ref 82.6–102.9)
MONOCYTES # BLD: 9 % (ref 3–12)
NRBC AUTOMATED: 0 PER 100 WBC
PDW BLD-RTO: 13.7 % (ref 11.8–14.4)
PLATELET # BLD: 218 K/UL (ref 138–453)
PLATELET ESTIMATE: ABNORMAL
PMV BLD AUTO: 10.5 FL (ref 8.1–13.5)
RBC # BLD: 4.57 M/UL (ref 3.95–5.11)
RBC # BLD: ABNORMAL 10*6/UL
SEG NEUTROPHILS: 54 % (ref 36–65)
SEGMENTED NEUTROPHILS ABSOLUTE COUNT: 5.72 K/UL (ref 1.5–8.1)
WBC # BLD: 10.3 K/UL (ref 3.5–11.3)
WBC # BLD: ABNORMAL 10*3/UL

## 2021-06-16 PROCEDURE — 3017F COLORECTAL CA SCREEN DOC REV: CPT | Performed by: OBSTETRICS & GYNECOLOGY

## 2021-06-16 PROCEDURE — 77063 BREAST TOMOSYNTHESIS BI: CPT

## 2021-06-16 PROCEDURE — 1123F ACP DISCUSS/DSCN MKR DOCD: CPT | Performed by: OBSTETRICS & GYNECOLOGY

## 2021-06-16 PROCEDURE — G8399 PT W/DXA RESULTS DOCUMENT: HCPCS | Performed by: OBSTETRICS & GYNECOLOGY

## 2021-06-16 PROCEDURE — 99213 OFFICE O/P EST LOW 20 MIN: CPT | Performed by: OBSTETRICS & GYNECOLOGY

## 2021-06-16 PROCEDURE — G8417 CALC BMI ABV UP PARAM F/U: HCPCS | Performed by: OBSTETRICS & GYNECOLOGY

## 2021-06-16 PROCEDURE — 99214 OFFICE O/P EST MOD 30 MIN: CPT

## 2021-06-16 PROCEDURE — 36415 COLL VENOUS BLD VENIPUNCTURE: CPT

## 2021-06-16 PROCEDURE — 1090F PRES/ABSN URINE INCON ASSESS: CPT | Performed by: OBSTETRICS & GYNECOLOGY

## 2021-06-16 PROCEDURE — G8427 DOCREV CUR MEDS BY ELIG CLIN: HCPCS | Performed by: OBSTETRICS & GYNECOLOGY

## 2021-06-16 PROCEDURE — 1036F TOBACCO NON-USER: CPT | Performed by: OBSTETRICS & GYNECOLOGY

## 2021-06-16 PROCEDURE — 85025 COMPLETE CBC W/AUTO DIFF WBC: CPT

## 2021-06-16 PROCEDURE — 4040F PNEUMOC VAC/ADMIN/RCVD: CPT | Performed by: OBSTETRICS & GYNECOLOGY

## 2021-06-16 PROCEDURE — 84443 ASSAY THYROID STIM HORMONE: CPT

## 2021-06-16 PROCEDURE — 76856 US EXAM PELVIC COMPLETE: CPT

## 2021-06-16 NOTE — PROGRESS NOTES
Delfino Norton  6/16/2021      Delfino Norton is a 69 y.o. female [id number]      The patient was seen today. She was here to follow-up regarding her labs and diagnostics ordered at her last visit for the diagnosis of:    ICD-10-CM    1. PMB (postmenopausal bleeding)  N95.0 CBC Auto Differential     TSH with Reflex     CANCELED: PAP SMEAR       Her bowels are regular and she is voiding without difficulty. She had PMB 6/3--6/7. Ultrasound is limited and not reported as of yet. Her CBC and TSH is ordered today.       Past Medical History:   Diagnosis Date    Allergic rhinitis     Anxiety     Asthma     Bronchitis     Chest pain     Chronic back pain     Depression     Fibromyalgia     GERD (gastroesophageal reflux disease)     Headache(784.0)     Heart palpitations     Irregular bowel habits     Macular degeneration     beginning    Measles     Osteoarthritis     Restless legs syndrome     Tachycardia     Unspecified sleep apnea     Wet senile macular degeneration (HCC)     bilateral- Sees a retinal Dr Sabiha Sena         Past Surgical History:   Procedure Laterality Date    COLONOSCOPY      DILATION AND CURETTAGE OF UTERUS N/A 03/12/2019    DILATION AND CURETTAGE OF UTERUS N/A 3/12/2019    Dilatation & Curettage Hysteroscopy w/ polypectomy performed by Joseph Rodriguez MD at [address]      cataracts removed right eye    KNEE ARTHROSCOPY      left    KNEE SURGERY      total replacement-right    KNEE SURGERY Left 11/2014    SIGMOIDOSCOPY      SINUS SURGERY      TONSILLECTOMY      TUBAL LIGATION           Family History   Problem Relation Age of Onset    Cancer Father         lung    Heart Disease Father     Cancer Paternal Grandmother     Heart Disease Paternal [de-identified]     Other Daughter         brain stem    Vision Loss Mother     Breast Cancer Sister     No Known Problems Sister     No Known Problems Paternal Aunt          Social History     Tobacco Use    Smoking status: Former Smoker     Packs/day: 1.00     Years: 22.00     Pack years: 22.00     Types: Cigarettes     Start date: 1975     Quit date: 1993     Years since quittin.6    Smokeless tobacco: Never Used   Vaping Use    Vaping Use: Never used   Substance Use Topics    Alcohol use:  Yes     Alcohol/week: 2.0 standard drinks     Types: 2 Glasses of wine per week    Drug use: No         MEDICATIONS:  Current Outpatient Medications   Medication Sig Dispense Refill    pantoprazole (PROTONIX) 40 MG tablet take 1 tablet by mouth once daily 30 tablet 5    cholestyramine (QUESTRAN) 4 g packet take 1 packet ( 4 grams ) by mouth once daily 30 packet 3    propranolol (INDERAL LA) 60 MG extended release capsule take 1 capsule by mouth once daily 30 capsule 5    montelukast (SINGULAIR) 10 MG tablet take 1 tablet by mouth every evening 90 tablet 1    buPROPion (WELLBUTRIN XL) 150 MG extended release tablet Take 1 tablet by mouth every morning 90 tablet 3    triamcinolone (KENALOG) 0.025 % cream Apply Topically daily as needed for itching 80 g 1    NONFORMULARY Take 1 capsule by mouth daily Cholesterol 360      Multiple Vitamins-Minerals (PRESERVISION AREDS 2) CHEW Take 1 each by mouth 2 times daily      Ascorbic Acid (VITAMIN C PO) Take 1 tablet by mouth daily      Cholecalciferol (VITAMIN D3 PO) Take 1 tablet by mouth daily      BIOTIN PO Take 1 tablet by mouth daily      FLUoxetine (PROZAC) 40 MG capsule take 1 capsule by mouth once daily 90 capsule 3    Nebulizers (COMPRESSOR/NEBULIZER) MISC Use as directed 1 each 3    CALCIUM-MAGNESIUM-VITAMIN D PO Take by mouth      ibuprofen (ADVIL;MOTRIN) 800 MG tablet Take 1 tablet by mouth every 8 hours as needed for Pain 30 tablet 0    hydrOXYzine (ATARAX) 25 MG tablet Take 1 tablet by mouth nightly as needed (intersitial cystitis) (Patient not taking: Reported on 2021) 90 tablet 1    VITAMIN E PO Take 1 capsule by mouth daily (Patient not taking: Reported on 6/16/2021)      albuterol (PROVENTIL) (2.5 MG/3ML) 0.083% nebulizer solution Take 3 mLs by nebulization every 4 hours as needed for Wheezing or Shortness of Breath (Patient not taking: Reported on 6/16/2021) 25 each 3    pseudoephedrine (SUDAFED 12 HR) 120 MG extended release tablet Take 120 mg by mouth every 12 hours (Patient not taking: Reported on 6/16/2021)      aspirin-acetaminophen-caffeine (EXCEDRIN MIGRAINE) 250-250-65 MG per tablet Take 1 tablet by mouth as needed for Headaches (Patient not taking: Reported on 6/16/2021)      fluticasone (FLONASE) 50 MCG/ACT nasal spray by Nasal route (Patient not taking: Reported on 6/16/2021)       No current facility-administered medications for this visit. ALLERGIES:  Allergies as of 06/16/2021 - Fully Reviewed 06/16/2021   Allergen Reaction Noted    Codeine Other (See Comments) 11/07/2011    Celebrex [celecoxib] Rash 11/07/2011         Blood pressure 122/70, pulse 50, height 5' 8\" (1.727 m), weight 219 lb (99.3 kg), SpO2 95 %, not currently breastfeeding. Chaperone for Intimate Exam   Chaperone was offered and accepted as part of the rooming process.  Chaperone: Patrina Cogan        Abdomen: Soft non-tender; good bowel sounds. No guarding, rebound or rigidity. No CVA tenderness bilaterally. Extremities: No calf tenderness, DTR 2/4, and No edema bilaterally    Pelvic: Declined         Diagnostics:  US PELVIS COMPLETE NON-OB TRANSABDOMINAL AND TRANSVAGINAL    Result Date: 6/16/2021  No significant change in the 8 appearance of the uterine fibroid as described above. Myometrium is mildly heterogeneous. Endometrial stripe within normal limits. LIMITED RESULTS AWAITING FINAL READ    Lab Results:  Results for orders placed or performed during the hospital encounter of 04/09/21   Culture, Urine    Specimen: Urine, clean catch   Result Value Ref Range    Specimen Description . CLEAN CATCH URINE     Special Requests NOT REPORTED Culture NO SIGNIFICANT GROWTH    Urinalysis Reflex to Culture    Specimen: Urine, clean catch   Result Value Ref Range    Color, UA NOT REPORTED YELLOW    Turbidity UA NOT REPORTED CLEAR    Glucose, Ur NEGATIVE NEGATIVE    Bilirubin Urine NEGATIVE NEGATIVE    Ketones, Urine NEGATIVE NEGATIVE    Specific Gravity, UA 1.005 (L) 1.010 - 1.025    Urine Hgb TRACE (A) NEGATIVE    pH, UA 6.0 5.0 - 6.0    Protein, UA NEGATIVE NEGATIVE    Urobilinogen, Urine Normal Normal    Nitrite, Urine POSITIVE (A) NEGATIVE    Leukocyte Esterase, Urine 1+ (A) NEGATIVE    Urinalysis Comments NOT REPORTED    Microscopic Urinalysis   Result Value Ref Range    -          WBC, UA 10 TO 25 0 - 4 /HPF    RBC, UA None 0 - 4 /HPF    Casts UA NOT REPORTED 0 - 2 /LPF    Crystals, UA NOT REPORTED None /HPF    Epithelial Cells UA 0 TO 4 0 - 5 /HPF    Renal Epithelial, UA NOT REPORTED 0 /HPF    Bacteria, UA TRACE (A) None    Mucus, UA NOT REPORTED None    Trichomonas, UA NOT REPORTED None    Amorphous, UA NOT REPORTED None    Other Observations UA Specimen Cultured (A) NOT REQ. Yeast, UA NOT REPORTED None           Assessment:   Diagnosis Orders   1.  PMB (postmenopausal bleeding)  CBC Auto Differential    TSH with Reflex     Chief Complaint   Patient presents with    Vaginal Bleeding     PMB    Other     thickened endo         Patient Active Problem List    Diagnosis Date Noted    Chronic migraine without aura with status migrainosus, not intractable 05/18/2021    Mild episode of recurrent major depressive disorder ([address] 10/22/2020    Aftercare following joint replacement surgery 11/12/2019    Artificial knee joint present 11/12/2019    Primary localized osteoarthrosis of ankle and foot 11/12/2019    Atrial flutter ([address] 05/14/2019    Postmenopausal bleeding 02/07/2019    Intramural leiomyoma of uterus 02/07/2019    Intrinsic eczema 11/01/2018    Vitamin D deficiency 12/22/2016    Sleep apnea      Updating Deprecated Diagnoses      Fibromyalgia     GERD (gastroesophageal reflux disease)     Depression     Arthritis of knee, degenerative 12/16/2014    Osteoarthritis of left knee 12/02/2014    Pancreatic cyst 07/29/2014    Diverticulosis of colon 06/12/2014    Myalgia and myositis 11/12/2012    Anxiety state 10/15/2012    Backache 09/12/2012    Allergic rhinitis 08/24/2012    Chronic ethmoidal sinusitis 01/04/2012    Chronic frontal sinusitis 01/04/2012    Nasal vestibulitis 01/04/2012    Epistaxis 01/04/2012    Nasal septal deviation 01/04/2012    Nasal turbinate hypertrophy 01/04/2012    Chronic sphenoidal sinusitis 01/04/2012    Chronic cough 11/07/2011    Chronic maxillary sinusitis 11/07/2011    Asthma 07/22/2011    Hyperlipidemia 10/12/2006       PLAN:  Return in about 2 weeks (around 6/30/2021) for [facility] & Hysteroscopy with PAP collection. Ultrasound is pending  CBC and TSH ordered   Needs pap. Last 2017 WNL at   Return to the office in 2 weeks. Counseled on preventative health maintenance follow-up. Orders Placed This Encounter   Procedures    CBC Auto Differential     Standing Status:   Future     Standing Expiration Date:   6/16/2022    TSH with Reflex     Standing Status:   Future     Standing Expiration Date:   6/16/2022           The patient, Cody Ash is a 69 y.o. female, was seen with a total time spent of 20 minutes for the visit on this date of service by the E/M provider. The time component had both face to face and non face to face time spent in determining the total time component. Counseling and education regarding her diagnosis listed below and her options regarding those diagnoses were also included in determining her time component. Diagnosis Orders   1. PMB (postmenopausal bleeding)  CBC Auto Differential    TSH with Reflex        The patient had her preventative health maintenance recommendations and follow-up reviewed with her at the completion of her visit.

## 2021-06-17 ENCOUNTER — TELEPHONE (OUTPATIENT)
Dept: OBGYN CLINIC | Age: 71
End: 2021-06-17

## 2021-06-17 LAB — TSH SERPL DL<=0.05 MIU/L-ACNC: 1.54 MIU/L (ref 0.3–5)

## 2021-06-30 ENCOUNTER — PROCEDURE VISIT (OUTPATIENT)
Dept: OBGYN | Age: 71
End: 2021-06-30
Payer: MEDICARE

## 2021-06-30 ENCOUNTER — HOSPITAL ENCOUNTER (OUTPATIENT)
Age: 71
Setting detail: SPECIMEN
Discharge: HOME | End: 2021-06-30
Payer: MEDICARE

## 2021-06-30 VITALS
BODY MASS INDEX: 33.65 KG/M2 | HEIGHT: 68 IN | DIASTOLIC BLOOD PRESSURE: 82 MMHG | WEIGHT: 222 LBS | SYSTOLIC BLOOD PRESSURE: 120 MMHG | HEART RATE: 60 BPM

## 2021-06-30 DIAGNOSIS — E55.9 VITAMIN D DEFICIENCY: ICD-10-CM

## 2021-06-30 DIAGNOSIS — N95.0 PMB (POSTMENOPAUSAL BLEEDING): ICD-10-CM

## 2021-06-30 DIAGNOSIS — N95.0 PMB (POSTMENOPAUSAL BLEEDING): Primary | ICD-10-CM

## 2021-06-30 PROCEDURE — 88305 TISSUE EXAM BY PATHOLOGIST: CPT

## 2021-06-30 PROCEDURE — 87624 HPV HI-RISK TYP POOLED RSLT: CPT

## 2021-06-30 PROCEDURE — 88175 CYTOPATH C/V AUTO FLUID REDO: CPT

## 2021-06-30 PROCEDURE — 58100 BIOPSY OF UTERUS LINING: CPT | Performed by: OBSTETRICS & GYNECOLOGY

## 2021-06-30 NOTE — PROGRESS NOTES
1.5 cm, similar to prior study. Uterus measures 7.2 x 3.1 x 4.1 cm. The myometrium is mildly heterogeneous. Right ovary measures 1.4 x 1.2 x 1.1 cm. Right ovary appears unremarkable. Left ovary measures 1.3 x 1.2 x 0.9 cm. Left ovary appears unremarkable. No free fluid was identified. No significant change in the appearance of the uterine fibroid as described above. Myometrium is mildly heterogeneous. Endometrial stripe within normal limits.        LABS:      Hospital Outpatient Visit on 06/16/2021   Component Date Value Ref Range Status    WBC 06/16/2021 10.3  3.5 - 11.3 k/uL Final    RBC 06/16/2021 4.57  3.95 - 5.11 m/uL Final    Hemoglobin 06/16/2021 13.8  11.9 - 15.1 g/dL Final    Hematocrit 06/16/2021 44.6  36.3 - 47.1 % Final    MCV 06/16/2021 97.6  82.6 - 102.9 fL Final    MCH 06/16/2021 30.2  25.2 - 33.5 pg Final    MCHC 06/16/2021 30.9  25.2 - 33.5 g/dL Final    RDW 06/16/2021 13.7  11.8 - 14.4 % Final    Platelets 53/45/0405 218  138 - 453 k/uL Final    MPV 06/16/2021 10.5  8.1 - 13.5 fL Final    NRBC Automated 06/16/2021 0.0  0.0 per 100 WBC Final    Differential Type 06/16/2021 NOT REPORTED   Final    Seg Neutrophils 06/16/2021 54  36 - 65 % Final    Lymphocytes 06/16/2021 34  24 - 43 % Final    Monocytes 06/16/2021 9  3 - 12 % Final    Eosinophils % 06/16/2021 1  1 - 4 % Final    Basophils 06/16/2021 1  0 - 2 % Final    Immature Granulocytes 06/16/2021 1* 0 % Final    Segs Absolute 06/16/2021 5.72  1.50 - 8.10 k/uL Final    Absolute Lymph # 06/16/2021 3.47  1.10 - 3.70 k/uL Final    Absolute Mono # 06/16/2021 0.89  0.10 - 1.20 k/uL Final    Absolute Eos # 06/16/2021 0.11  0.00 - 0.44 k/uL Final    Basophils Absolute 06/16/2021 0.07  0.00 - 0.20 k/uL Final    Absolute Immature Granulocyte 06/16/2021 0.07  0.00 - 0.30 k/uL Final    WBC Morphology 06/16/2021 NOT REPORTED   Final    RBC Morphology 06/16/2021 NOT REPORTED   Final    Platelet Estimate 27/07/9639 NOT REPORTED Final    TSH 06/16/2021 1.54  0.30 - 5.00 mIU/L Final   ]    Diagnosis:    Diagnosis Orders   1. PMB (postmenopausal bleeding)  PAP SMEAR    Surgical Pathology    69088 - CT BIOPSY OF UTERUS LINING   2. Vitamin D deficiency  DEXA AXIAL SKELETON W VERTEBRAL FX ASST     Patient Active Problem List    Diagnosis Date Noted    Chronic migraine without aura with status migrainosus, not intractable 05/18/2021    Mild episode of recurrent major depressive disorder ([facility] [address] 10/22/2020    Aftercare following joint replacement surgery 11/12/2019    Artificial knee joint present 11/12/2019    Primary localized osteoarthrosis of ankle and foot 11/12/2019    Atrial flutter ([facility] [address] 05/14/2019    Postmenopausal bleeding 02/07/2019    Intramural leiomyoma of uterus 02/07/2019    Intrinsic eczema 11/01/2018    Vitamin D deficiency 12/22/2016    Sleep apnea      Updating Deprecated Diagnoses      Fibromyalgia     GERD (gastroesophageal reflux disease)     Depression     Arthritis of knee, degenerative 12/16/2014    Osteoarthritis of left knee 12/02/2014    Pancreatic cyst 07/29/2014    Diverticulosis of colon 06/12/2014    Myalgia and myositis 11/12/2012    Anxiety state 10/15/2012    Backache 09/12/2012    Allergic rhinitis 08/24/2012    Chronic ethmoidal sinusitis 01/04/2012    Chronic frontal sinusitis 01/04/2012    Nasal vestibulitis 01/04/2012    Epistaxis 01/04/2012    Nasal septal deviation 01/04/2012    Nasal turbinate hypertrophy 01/04/2012    Chronic sphenoidal sinusitis 01/04/2012    Chronic cough 11/07/2011    Chronic maxillary sinusitis 11/07/2011    Asthma 07/22/2011    Hyperlipidemia 10/12/2006       Chaperone for Intimate Exam   Chaperone was offered and accepted as part of the rooming process.  Chaperone: Naomie Luna          Procedure Note:  After voiding, the patient returned to the exam room where she was placed in the dorsal- lithotomy position.   A bimanual examination was performed without pathological changes from her most recent history and physical examination; . A bi-valve speculum was then placed into the vaginal vault allowing visualization of the cervix. The pap was collected. The surgical field was then cleansed with betadine. Under direct visualization an allis was placed on the anterior cervical lip. The uterus was sounded to 6 cm. An endometrial sampling was then collected. The patient experienced cramping and requested the hysteroscopy with video not be performed. The procedure was completed. Pathology is pending. The patient tolerated the procedure well, the Allis was removed off the anterior cervical lip and there was noted to be adequate hemostasis. The patient was given restrictions and will follow-up in the office in 10-14 days. She will report any abdominal pain, heavy vaginal bleeding or a temperature of greater than 100.4. Hysteroscopic Findings:  NA    Assessment:   Diagnosis Orders   1. PMB (postmenopausal bleeding)  PAP SMEAR    Surgical Pathology    59138 - MS BIOPSY OF UTERUS LINING   2.  Vitamin D deficiency  DEXA AXIAL SKELETON W VERTEBRAL FX ASST     Patient Active Problem List    Diagnosis Date Noted    Chronic migraine without aura with status migrainosus, not intractable 05/18/2021    Mild episode of recurrent major depressive disorder ([address] 10/22/2020    Aftercare following joint replacement surgery 11/12/2019    Artificial knee joint present 11/12/2019    Primary localized osteoarthrosis of ankle and foot 11/12/2019    Atrial flutter ([address] 05/14/2019    Postmenopausal bleeding 02/07/2019    Intramural leiomyoma of uterus 02/07/2019    Intrinsic eczema 11/01/2018    Vitamin D deficiency 12/22/2016    Sleep apnea      Overview Note:     Updating Deprecated Diagnoses      Fibromyalgia     GERD (gastroesophageal reflux disease)     Depression     Arthritis of knee, degenerative 12/16/2014    Osteoarthritis of left knee 12/02/2014   

## 2021-07-02 LAB
HPV SAMPLE: NORMAL
HPV, GENOTYPE 16: NOT DETECTED
HPV, GENOTYPE 18: NOT DETECTED
HPV, HIGH RISK OTHER: NOT DETECTED
HPV, INTERPRETATION: NORMAL
SPECIMEN DESCRIPTION: NORMAL
SURGICAL PATHOLOGY REPORT: NORMAL

## 2021-07-07 NOTE — TELEPHONE ENCOUNTER
Fadi Held called requesting a refill of the below medication which has been pended for you:     Requested Prescriptions     Pending Prescriptions Disp Refills    montelukast (SINGULAIR) 10 MG tablet [Pharmacy Med Name: MONTELUKAST SOD 10 MG TABLET] 90 tablet 1     Sig: take 1 tablet by mouth every evening       Last Appointment Date: 5/18/2021  Next Appointment Date: Visit date not found    Allergies   Allergen Reactions    Codeine Other (See Comments)     Gives her a headache    Celebrex [Celecoxib] Rash     Whole body

## 2021-07-08 LAB — CYTOLOGY REPORT: NORMAL

## 2021-07-08 RX ORDER — MONTELUKAST SODIUM 10 MG/1
TABLET ORAL
Qty: 90 TABLET | Refills: 1 | Status: SHIPPED | OUTPATIENT
Start: 2021-07-08 | End: 2021-09-15 | Stop reason: SDUPTHER

## 2021-07-15 ENCOUNTER — TELEPHONE (OUTPATIENT)
Dept: OBGYN | Age: 71
End: 2021-07-15

## 2021-07-26 RX ORDER — FLUOXETINE HYDROCHLORIDE 40 MG/1
CAPSULE ORAL
Qty: 90 CAPSULE | Refills: 1 | Status: SHIPPED | OUTPATIENT
Start: 2021-07-26 | End: 2021-09-15 | Stop reason: SDUPTHER

## 2021-07-26 NOTE — TELEPHONE ENCOUNTER
Josue Butler called requesting a refill of the below medication which has been pended for you:     Requested Prescriptions     Pending Prescriptions Disp Refills    FLUoxetine (PROZAC) 40 MG capsule [Pharmacy Med Name: FLUOXETINE HCL 40 MG CAPSULE] 90 capsule 1     Sig: take 1 capsule by mouth once daily       Last Appointment Date: 5/18/2021  Next Appointment Date: Visit date not found    Allergies   Allergen Reactions    Codeine Other (See Comments)     Gives her a headache    Celebrex [Celecoxib] Rash     Whole body

## 2021-07-28 ENCOUNTER — PREP FOR PROCEDURE (OUTPATIENT)
Dept: OBGYN | Age: 71
End: 2021-07-28

## 2021-07-28 ENCOUNTER — OFFICE VISIT (OUTPATIENT)
Dept: OBGYN | Age: 71
End: 2021-07-28
Payer: MEDICARE

## 2021-07-28 VITALS
WEIGHT: 224.25 LBS | DIASTOLIC BLOOD PRESSURE: 86 MMHG | SYSTOLIC BLOOD PRESSURE: 124 MMHG | BODY MASS INDEX: 33.99 KG/M2 | HEIGHT: 68 IN | HEART RATE: 64 BPM

## 2021-07-28 DIAGNOSIS — N95.0 PMB (POSTMENOPAUSAL BLEEDING): Primary | ICD-10-CM

## 2021-07-28 DIAGNOSIS — Z01.818 PREOP TESTING: ICD-10-CM

## 2021-07-28 PROCEDURE — 1123F ACP DISCUSS/DSCN MKR DOCD: CPT | Performed by: OBSTETRICS & GYNECOLOGY

## 2021-07-28 PROCEDURE — 81003 URINALYSIS AUTO W/O SCOPE: CPT | Performed by: OBSTETRICS & GYNECOLOGY

## 2021-07-28 PROCEDURE — 3017F COLORECTAL CA SCREEN DOC REV: CPT | Performed by: OBSTETRICS & GYNECOLOGY

## 2021-07-28 PROCEDURE — 99215 OFFICE O/P EST HI 40 MIN: CPT

## 2021-07-28 PROCEDURE — G8417 CALC BMI ABV UP PARAM F/U: HCPCS | Performed by: OBSTETRICS & GYNECOLOGY

## 2021-07-28 PROCEDURE — 1090F PRES/ABSN URINE INCON ASSESS: CPT | Performed by: OBSTETRICS & GYNECOLOGY

## 2021-07-28 PROCEDURE — 1036F TOBACCO NON-USER: CPT | Performed by: OBSTETRICS & GYNECOLOGY

## 2021-07-28 PROCEDURE — 4040F PNEUMOC VAC/ADMIN/RCVD: CPT | Performed by: OBSTETRICS & GYNECOLOGY

## 2021-07-28 PROCEDURE — G8399 PT W/DXA RESULTS DOCUMENT: HCPCS | Performed by: OBSTETRICS & GYNECOLOGY

## 2021-07-28 PROCEDURE — 99213 OFFICE O/P EST LOW 20 MIN: CPT | Performed by: OBSTETRICS & GYNECOLOGY

## 2021-07-28 PROCEDURE — G8427 DOCREV CUR MEDS BY ELIG CLIN: HCPCS | Performed by: OBSTETRICS & GYNECOLOGY

## 2021-07-28 RX ORDER — SODIUM CHLORIDE 0.9 % (FLUSH) 0.9 %
10 SYRINGE (ML) INJECTION PRN
Status: CANCELLED | OUTPATIENT
Start: 2021-07-28

## 2021-07-28 RX ORDER — SODIUM CHLORIDE 9 MG/ML
25 INJECTION, SOLUTION INTRAVENOUS PRN
Status: CANCELLED | OUTPATIENT
Start: 2021-07-28

## 2021-07-28 RX ORDER — SODIUM CHLORIDE, SODIUM LACTATE, POTASSIUM CHLORIDE, CALCIUM CHLORIDE 600; 310; 30; 20 MG/100ML; MG/100ML; MG/100ML; MG/100ML
INJECTION, SOLUTION INTRAVENOUS CONTINUOUS
Status: CANCELLED | OUTPATIENT
Start: 2021-07-28

## 2021-07-28 RX ORDER — SODIUM CHLORIDE 0.9 % (FLUSH) 0.9 %
10 SYRINGE (ML) INJECTION EVERY 12 HOURS SCHEDULED
Status: CANCELLED | OUTPATIENT
Start: 2021-07-28

## 2021-07-28 NOTE — PROGRESS NOTES
[facility]  [address]; [address]  [address]  [phone] mn [phone] Fax        Kristi Turner  1950  Primary Care Physician: Lolly Solorio MD        [address] [place] [address] Street: Mel[place]      7/28/2021  MEDICAID CONSENT COMPLETED: No      HPI: Kristi Turner is a 69 y.o. female [id number]  she is being seen for the problems listed below and is planning surgical intervention. She was counseled on all conservative medical and surgical options as well as definitive procedures as well.     1. PMB (postmenopausal bleeding)          Relevant Past History:   Patient Active Problem List    Diagnosis Date Noted    Chronic migraine without aura with status migrainosus, not intractable 05/18/2021    Mild episode of recurrent major depressive disorder ([facility] [address] 10/22/2020    Aftercare following joint replacement surgery 11/12/2019    Artificial knee joint present 11/12/2019    Primary localized osteoarthrosis of ankle and foot 11/12/2019    Atrial flutter ([address] 05/14/2019    Postmenopausal bleeding 02/07/2019    Intramural leiomyoma of uterus 02/07/2019    Intrinsic eczema 11/01/2018    Vitamin D deficiency 12/22/2016    Sleep apnea      Updating Deprecated Diagnoses      Fibromyalgia     GERD (gastroesophageal reflux disease)     Depression     Arthritis of knee, degenerative 12/16/2014    Osteoarthritis of left knee 12/02/2014    Pancreatic cyst 07/29/2014    Diverticulosis of colon 06/12/2014    Myalgia and myositis 11/12/2012    Anxiety state 10/15/2012    Backache 09/12/2012    Allergic rhinitis 08/24/2012    Chronic ethmoidal sinusitis 01/04/2012    Chronic frontal sinusitis 01/04/2012    Nasal vestibulitis 01/04/2012    Epistaxis 01/04/2012    Nasal septal deviation 01/04/2012    Nasal turbinate hypertrophy 01/04/2012    Chronic sphenoidal sinusitis 01/04/2012    Chronic cough 11/07/2011    Chronic maxillary sinusitis 2011    Asthma 2011    Hyperlipidemia 10/12/2006         OB History    Para Term  AB Living   3 2 2 0 1 2   SAB TAB Ectopic Molar Multiple Live Births   1 0 0 0 0 2      # Outcome Date GA Lbr Rigo/2nd Weight Sex Delivery Anes PTL Lv   3 Term 77    M Vag-Spont         Name: mirna   2 Term 71    F Vag-Spont         Name: jeff- at age 14 1/2 brain tumor   1 SAB                Past Medical History:   Diagnosis Date    Allergic rhinitis     Anxiety     Asthma     Bronchitis     Chest pain     Chronic back pain     Depression     Fibromyalgia     GERD (gastroesophageal reflux disease)     Headache(784.0)     Heart palpitations     Irregular bowel habits     Macular degeneration     beginning    Measles     Osteoarthritis     Restless legs syndrome     Tachycardia     Unspecified sleep apnea     Wet senile macular degeneration (HCC)     bilateral- Sees a retinal Dr Jazzy Mejia       Past Surgical History:   Procedure Laterality Date    COLONOSCOPY      DILATION AND CURETTAGE OF UTERUS N/A 2019    DILATION AND CURETTAGE OF UTERUS N/A 3/12/2019    Dilatation & Curettage Hysteroscopy w/ polypectomy performed by Renee Quevedo MD at [address] [place] [address]      cataracts removed right eye    KNEE ARTHROSCOPY      left    KNEE SURGERY      total replacement-right    KNEE SURGERY Left 2014    SIGMOIDOSCOPY      SINUS SURGERY      TONSILLECTOMY      TUBAL LIGATION         Social History     Socioeconomic History    Marital status:      Spouse name: Not on file    Number of children: Not on file    Years of education: Not on file    Highest education level: Not on file   Occupational History    Not on file   Tobacco Use    Smoking status: Former Smoker     Packs/day: 1.00     Years: 22.00     Pack years: 22.00     Types: Cigarettes     Start date: 1975     Quit date: 1993     Years since quittin.7    Smokeless tobacco: Never Used   Vaping Use    Vaping Use: Never used   Substance and Sexual Activity    Alcohol use: Yes     Alcohol/week: 2.0 standard drinks     Types: 2 Glasses of wine per week    Drug use: No    Sexual activity: Never     Partners: Male     Comment: not sexually active for 15 + years   Other Topics Concern    Not on file   Social History Narrative    Not on file     Social Determinants of Health     Financial Resource Strain: Unknown    Difficulty of Paying Living Expenses: Patient refused   Food Insecurity: Unknown    Worried About Running Out of Food in the Last Year: Patient refused    [address] in the Last Year: Patient refused   Transportation Needs:     Lack of Transportation (Medical):      Lack of Transportation (Non-Medical):    Physical Activity:     Days of Exercise per Week:     Minutes of Exercise per Session:    Stress:     Feeling of Stress :    Social Connections:     Frequency of Communication with Friends and Family:     Frequency of Social Gatherings with Friends and Family:     Attends Buddhist Services:     Active Member of Clubs or Organizations:     Attends Club or Organization Meetings:     Marital Status:    Intimate Partner Violence:     Fear of Current or Ex-Partner:     Emotionally Abused:     Physically Abused:     Sexually Abused:        Psychosocial History: Stable    MEDICATIONS:  Current Outpatient Medications   Medication Sig Dispense Refill    FLUoxetine (PROZAC) 40 MG capsule take 1 capsule by mouth once daily 90 capsule 1    montelukast (SINGULAIR) 10 MG tablet take 1 tablet by mouth every evening 90 tablet 1    pantoprazole (PROTONIX) 40 MG tablet take 1 tablet by mouth once daily 30 tablet 5    cholestyramine (QUESTRAN) 4 g packet take 1 packet ( 4 grams ) by mouth once daily 30 packet 3    propranolol (INDERAL LA) 60 MG extended release capsule take 1 capsule by mouth once daily 30 capsule 5    hydrOXYzine (ATARAX) 25 MG tablet Take 1 tablet by mouth nightly as needed (intersitial cystitis) 90 tablet 1    buPROPion (WELLBUTRIN XL) 150 MG extended release tablet Take 1 tablet by mouth every morning 90 tablet 3    triamcinolone (KENALOG) 0.025 % cream Apply Topically daily as needed for itching 80 g 1    NONFORMULARY Take 1 capsule by mouth daily Cholesterol 360      Multiple Vitamins-Minerals (PRESERVISION AREDS 2) CHEW Take 1 each by mouth 2 times daily      VITAMIN E PO Take 1 capsule by mouth daily       Ascorbic Acid (VITAMIN C PO) Take 1 tablet by mouth daily      Cholecalciferol (VITAMIN D3 PO) Take 1 tablet by mouth daily      BIOTIN PO Take 1 tablet by mouth daily      Nebulizers (COMPRESSOR/NEBULIZER) MISC Use as directed 1 each 3    CALCIUM-MAGNESIUM-VITAMIN D PO Take by mouth      pseudoephedrine (SUDAFED 12 HR) 120 MG extended release tablet Take 120 mg by mouth every 12 hours       fluticasone (FLONASE) 50 MCG/ACT nasal spray by Nasal route       ibuprofen (ADVIL;MOTRIN) 800 MG tablet Take 1 tablet by mouth every 8 hours as needed for Pain 30 tablet 0    albuterol (PROVENTIL) (2.5 MG/3ML) 0.083% nebulizer solution Take 3 mLs by nebulization every 4 hours as needed for Wheezing or Shortness of Breath (Patient not taking: Reported on 6/16/2021) 25 each 3     No current facility-administered medications for this visit. ALLERGIES:  Allergies as of 07/28/2021 - Fully Reviewed 07/28/2021   Allergen Reaction Noted    Codeine Other (See Comments) 11/07/2011    Celebrex [celecoxib] Rash 11/07/2011           REVIEW OF SYSTEMS:      General appearance:Appears healthy. Alert; in no acute distress. Pleasant.   HEENT: +Glasses (legally blind)  CARDIOVASCULAR: Denies: chest pain, dyspnea on exertion, palpitations  RESPIRATORY: Denies: cough, shortness of breath, wheezing  GI: Denies: abdominal pain, flank pain, nausea, vomiting, diarrhea  : Denies: dysuria, frequency/urgency, hematuria, pelvic pain  MUSCULOSKELETAL: Denies: back pain, joint swelling, muscle pain  SKIN: Denies: rash, hives. HEMATOLOGIC: Denies Bleeding Disorder, Coagulopathy or Transfusion  NEUROLOGIC: Denies Migraines, Headaches, CVA, TIA  ENDOCRINE: Denies any Endocrinologic disorders                PHYSICAL EXAM:  Blood pressure 124/86, pulse 64, height 5' 8\" (1.727 m), weight 224 lb 4 oz (101.7 kg), not currently breastfeeding. General Appearance:  awake, alert, oriented, in no acute distress, well developed, well nourished, in no acute distress   Skin:  Skin color, texture, turgor normal. No rashes or lesions  Mouth/Throat:  Mucosa moist.  No lesions. Pharynx without erythema, edema or exudate. Lungs:  Normal expansion. Clear to auscultation. No rales, rhonchi, or wheezing. Heart:  Heart sounds are normal.  Regular rate and rhythm without murmur, gallop or rub. Breast:  Declined  Abdomen:  Soft, non-tender, normal bowel sounds. No bruits, organomegaly or masses. Extremities: Extremities warm to touch, pink, with no edema. Musculoskeletal:  negative  Peripheral Pulses:  Normal  Neurologic:  Gait normal. Reflexes normal and symmetric. Sensation grossly intact. Female Urogen:  Declined  Female Rectal: Declined    OMM Structural Component:  The patient  Did not complain of a Chief complaint requiring OMM. Chief Complaint:none    Structural Exam: No Interest              Diagnostics:  US PELVIS COMPLETE NON-OB TRANSABDOMINAL AND TRANSVAGINAL     Result Date: 6/16/2021  EXAMINATION: TRANSABDOMINAL AND TRANSVAGINAL PELVIC ULTRASOUND 6/16/2021 10:57 am COMPARISON: 10/08/2019 HISTORY: ORDERING SYSTEM PROVIDED HISTORY: Thickened endometrium TECHNOLOGIST PROVIDED HISTORY: Reason for Exam: HX OF THICKENED ENDOMETRIUM Acuity: Chronic Type of Exam: Subsequent/Follow-up Relevant Medical/Surgical History: HX OF D&C, TUBAL LIGATION FINDINGS: Endometrial stripe thickness, 2.3 mm.  Posterior uterine mass with features suggestive of fibroid measuring 1.8 x 1.6 x 1.5 cm, similar to prior study. Uterus measures 7.2 x 3.1 x 4.1 cm. The myometrium is mildly heterogeneous. Right ovary measures 1.4 x 1.2 x 1.1 cm. Right ovary appears unremarkable. Left ovary measures 1.3 x 1.2 x 0.9 cm. Left ovary appears unremarkable. No free fluid was identified.      No significant change in the appearance of the uterine fibroid as described above. Myometrium is mildly heterogeneous.  Endometrial stripe within normal limits.         LABS:                Hospital Outpatient Visit on 06/16/2021   Component Date Value Ref Range Status    WBC 06/16/2021 10.3  3.5 - 11.3 k/uL Final    RBC 06/16/2021 4.57  3.95 - 5.11 m/uL Final    Hemoglobin 06/16/2021 13.8  11.9 - 15.1 g/dL Final    Hematocrit 06/16/2021 44.6  36.3 - 47.1 % Final    MCV 06/16/2021 97.6  82.6 - 102.9 fL Final    MCH 06/16/2021 30.2  25.2 - 33.5 pg Final    MCHC 06/16/2021 30.9  25.2 - 33.5 g/dL Final    RDW 06/16/2021 13.7  11.8 - 14.4 % Final    Platelets 53/57/4726 218  138 - 453 k/uL Final    MPV 06/16/2021 10.5  8.1 - 13.5 fL Final    NRBC Automated 06/16/2021 0.0  0.0 per 100 WBC Final    Differential Type 06/16/2021 NOT REPORTED    Final    Seg Neutrophils 06/16/2021 54  36 - 65 % Final    Lymphocytes 06/16/2021 34  24 - 43 % Final    Monocytes 06/16/2021 9  3 - 12 % Final    Eosinophils % 06/16/2021 1  1 - 4 % Final    Basophils 06/16/2021 1  0 - 2 % Final    Immature Granulocytes 06/16/2021 1* 0 % Final    Segs Absolute 06/16/2021 5.72  1.50 - 8.10 k/uL Final    Absolute Lymph # 06/16/2021 3.47  1.10 - 3.70 k/uL Final    Absolute Mono # 06/16/2021 0.89  0.10 - 1.20 k/uL Final    Absolute Eos # 06/16/2021 0.11  0.00 - 0.44 k/uL Final    Basophils Absolute 06/16/2021 0.07  0.00 - 0.20 k/uL Final    Absolute Immature Granulocyte 06/16/2021 0.07  0.00 - 0.30 k/uL Final    WBC Morphology 06/16/2021 NOT REPORTED    Final    RBC Morphology 06/16/2021 NOT REPORTED    Final    Platelet Estimate 06/16/2021 NOT REPORTED    Final    TSH 06/16/2021 1.54  0.30 - 5.00 mIU/L Final   ]      Lab Results:  Results for orders placed or performed during the hospital encounter of 06/30/21   Human papillomavirus (HPV) DNA probe thin prep high risk   Result Value Ref Range    Specimen Description . CERVIX     HPV Sample . THIN PREP     HPV, Genotype 16 Not Detected Not Detected    HPV, Genotype 18 Not Detected Not Detected    HPV, High Risk Other Not Detected Not Detected    HPV, Interpretation         Surgical Pathology   Result Value Ref Range    Surgical Pathology Report       -- Diagnosis --    Tissue, source not designated:  Inactive endometrium. DANIEL Wilson Sa  **Electronically Signed Out**         rdd/7/2/2021       Clinical Information  Pre-op Diagnosis:  POST MENOPAUSAL BLEEDING   Operative Findings:  TISSUE     Source of Specimen  1: POST MENOPAUSAL BLEEDING    Gross Description  \"KAILEE GERONIMO, BIOPSY\"  The specimen is filtered, fluid saved,  and consists of a scant amount of white mucinous fragments that may  not survive processing. Entirely 1 cs.  jg js      Microscopic Description  Skin tissue consists of an active endometrium without atypia. SURGICAL PATHOLOGY CONSULTATION       Patient Name: Gena Welch [facility] Rec: [id number]  Path Number: [id number]    [facility]  [facility]  ANATOMIC PATHOLOGY  [address] [place] [address]. [place], [address]  [phone]  Fax: [phone]     GYN Cytology   Result Value Ref Range    Cytology Report       INTERPRETATION    Cervical material, (ThinPrep vial, Imaging-assisted review):  Specimen Adequacy:       Satisfactory for evaluation.       - Endocervical/transformation zone component present. Descriptive Diagnosis:       Negative for intraepithelial lesion or malignancy. Comments:       High Risk HPV testing was ordered. Cytotechnologist:   JULIANNE Recinos CD(ASCP)  **Electronically Signed eliseo-operative and post-operative states including the recovery window of their procedure. The patient was made aware that maria fernanda Covid-19 after a surgical procedure may worsen their prognosis for recovering from the virus and lend to a higher morbidity and or mortality risk. The patient was given the options of postponing their procedure. All of the risks, benefits, and alternatives were discussed. The patient  does wish to proceed with the procedure. Assessment:     Diagnosis Orders   1.  PMB (postmenopausal bleeding)         Patient Active Problem List    Diagnosis Date Noted    Chronic migraine without aura with status migrainosus, not intractable 05/18/2021    Mild episode of recurrent major depressive disorder ([facility] [address] 10/22/2020    Aftercare following joint replacement surgery 11/12/2019    Artificial knee joint present 11/12/2019    Primary localized osteoarthrosis of ankle and foot 11/12/2019    Atrial flutter ([facility] [address] 05/14/2019    Postmenopausal bleeding 02/07/2019    Intramural leiomyoma of uterus 02/07/2019    Intrinsic eczema 11/01/2018    Vitamin D deficiency 12/22/2016    Sleep apnea      Overview Note:     Updating Deprecated Diagnoses      Fibromyalgia     GERD (gastroesophageal reflux disease)     Depression     Arthritis of knee, degenerative 12/16/2014    Osteoarthritis of left knee 12/02/2014    Pancreatic cyst 07/29/2014    Diverticulosis of colon 06/12/2014    Myalgia and myositis 11/12/2012    Anxiety state 10/15/2012    Backache 09/12/2012    Allergic rhinitis 08/24/2012    Chronic ethmoidal sinusitis 01/04/2012    Chronic frontal sinusitis 01/04/2012    Nasal vestibulitis 01/04/2012    Epistaxis 01/04/2012    Nasal septal deviation 01/04/2012    Nasal turbinate hypertrophy 01/04/2012    Chronic sphenoidal sinusitis 01/04/2012    Chronic cough 11/07/2011    Chronic maxillary sinusitis 11/07/2011    Asthma 07/22/2011    Hyperlipidemia 10/12/2006 Permanent Sterilization Completed: Yes     Plan:  1.  D&C Hysteroscopy  No orders of the defined types were placed in this encounter. Counseling: The patient was counseled on all options both medical and surgical, conservative as well as definitive. She has elected to proceed with the procedure as stated above. The patient was counseled on the procedure. Risks and complications were reviewed in detail. The patients orders, labs, consents have been completed. The history and physical as well as all supporting surgical documentation will be forwarded to the pre-operative holding area. The patient is aware that this procedure may not alleviate her symptoms. That there may be a necessity for a second surgery and that there may be an incomplete removal of abnormal tissue. The patients that are undergoing a procedure for family planning WERE INSTRUCTED THAT THE PROCEDURE IS PERMANENT AND NON REVERSIBLE. FAILURE RATES OF 18-20/1000 CASES WERE REVIEWED AS WELL AS ALL ALTERNATE REVERSIBLE FORMS OF BIRTH CONTROL MALE AND FEMALE. THEY WERE COUNSELED THAT A FAILURE WOULD MOST LIKELY END UP IN AN ECTOPIC PREGNANCY, A PREGNANCY OUTSIDE OF THE UTERUS MOST COMMONLY IN THE FALLOPIAN TUBE, NECESSITATING FURTHER SURGERY, A BLOOD TRANSFUSION OR BOTH. POST TUBAL STERILIZATION SYNDROME WAS ALSO DISCUSSED WITH THE PATIENT AT LENGTH.              ________________________________________D. O. Date:_______________  Yuko Jameson,           The patient, Nayla Parsons is a 69 y.o. female, was seen with a total time spent of 20 minutes for the visit on this date of service by the E/M provider. The time component had both face to face and non face to face time spent in determining the total time component. Counseling and education regarding her diagnosis listed below and her options regarding those diagnoses were also included in determining her time component. Diagnosis Orders   1.  PMB

## 2021-07-30 ENCOUNTER — PRE-PROCEDURE TELEPHONE (OUTPATIENT)
Dept: PREADMISSION TESTING | Age: 71
End: 2021-07-30

## 2021-08-04 ENCOUNTER — TELEPHONE (OUTPATIENT)
Dept: OBGYN | Age: 71
End: 2021-08-04

## 2021-08-06 ENCOUNTER — HOSPITAL ENCOUNTER (OUTPATIENT)
Dept: PREADMISSION TESTING | Age: 71
Setting detail: SPECIMEN
Discharge: HOME | End: 2021-08-10
Payer: MEDICARE

## 2021-08-06 DIAGNOSIS — Z11.59 ENCOUNTER FOR SCREENING FOR OTHER VIRAL DISEASES: Primary | ICD-10-CM

## 2021-08-06 PROCEDURE — U0003 INFECTIOUS AGENT DETECTION BY NUCLEIC ACID (DNA OR RNA); SEVERE ACUTE RESPIRATORY SYNDROME CORONAVIRUS 2 (SARS-COV-2) (CORONAVIRUS DISEASE [COVID-19]), AMPLIFIED PROBE TECHNIQUE, MAKING USE OF HIGH THROUGHPUT TECHNOLOGIES AS DESCRIBED BY CMS-2020-01-R: HCPCS

## 2021-08-06 PROCEDURE — U0005 INFEC AGEN DETEC AMPLI PROBE: HCPCS

## 2021-08-07 LAB
SARS-COV-2: NORMAL
SARS-COV-2: NOT DETECTED
SOURCE: NORMAL

## 2021-08-09 ENCOUNTER — HOSPITAL ENCOUNTER (OUTPATIENT)
Dept: LAB | Age: 71
Discharge: HOME | End: 2021-08-09
Payer: MEDICARE

## 2021-08-09 DIAGNOSIS — Z01.818 PREOP TESTING: ICD-10-CM

## 2021-08-09 LAB
-: ABNORMAL
ABO/RH: NORMAL
ABSOLUTE EOS #: 0.26 K/UL (ref 0–0.44)
ABSOLUTE IMMATURE GRANULOCYTE: 0.04 K/UL (ref 0–0.3)
ABSOLUTE LYMPH #: 2.54 K/UL (ref 1.1–3.7)
ABSOLUTE MONO #: 0.68 K/UL (ref 0.1–1.2)
AMORPHOUS: ABNORMAL
ANTIBODY SCREEN: NEGATIVE
ARM BAND NUMBER: NORMAL
BACTERIA: ABNORMAL
BASOPHILS # BLD: 1 % (ref 0–2)
BASOPHILS ABSOLUTE: 0.06 K/UL (ref 0–0.2)
BILIRUBIN URINE: NEGATIVE
CASTS UA: ABNORMAL /LPF (ref 0–2)
COLOR: NORMAL
COMMENT UA: NORMAL
CRYSTALS, UA: ABNORMAL /HPF
DIFFERENTIAL TYPE: NORMAL
EOSINOPHILS RELATIVE PERCENT: 3 % (ref 1–4)
EPITHELIAL CELLS UA: ABNORMAL /HPF (ref 0–5)
EXPIRATION DATE: NORMAL
GLUCOSE URINE: NEGATIVE
HCT VFR BLD CALC: 42.5 % (ref 36.3–47.1)
HEMOGLOBIN: 13.7 G/DL (ref 11.9–15.1)
IMMATURE GRANULOCYTES: 0 %
KETONES, URINE: NEGATIVE
LEUKOCYTE ESTERASE, URINE: NEGATIVE
LYMPHOCYTES # BLD: 27 % (ref 24–43)
MCH RBC QN AUTO: 30.8 PG (ref 25.2–33.5)
MCHC RBC AUTO-ENTMCNC: 32.2 G/DL (ref 25.2–33.5)
MCV RBC AUTO: 95.5 FL (ref 82.6–102.9)
MONOCYTES # BLD: 7 % (ref 3–12)
MUCUS: ABNORMAL
NITRITE, URINE: NEGATIVE
NRBC AUTOMATED: 0 PER 100 WBC
OTHER OBSERVATIONS UA: ABNORMAL
PDW BLD-RTO: 13.4 % (ref 11.8–14.4)
PH UA: 5.5 (ref 5–6)
PLATELET # BLD: 227 K/UL (ref 138–453)
PLATELET ESTIMATE: NORMAL
PMV BLD AUTO: 10.9 FL (ref 8.1–13.5)
PROTEIN UA: NEGATIVE
RBC # BLD: 4.45 M/UL (ref 3.95–5.11)
RBC # BLD: NORMAL 10*6/UL
RBC UA: ABNORMAL /HPF (ref 0–4)
RENAL EPITHELIAL, UA: ABNORMAL /HPF
SEG NEUTROPHILS: 62 % (ref 36–65)
SEGMENTED NEUTROPHILS ABSOLUTE COUNT: 5.96 K/UL (ref 1.5–8.1)
SPECIFIC GRAVITY UA: 1.01 (ref 1.01–1.02)
TRICHOMONAS: ABNORMAL
TURBIDITY: NORMAL
URINE HGB: NEGATIVE
UROBILINOGEN, URINE: NORMAL
WBC # BLD: 9.5 K/UL (ref 3.5–11.3)
WBC # BLD: NORMAL 10*3/UL
WBC UA: ABNORMAL /HPF (ref 0–4)
YEAST: ABNORMAL

## 2021-08-09 PROCEDURE — 36415 COLL VENOUS BLD VENIPUNCTURE: CPT

## 2021-08-09 PROCEDURE — 86901 BLOOD TYPING SEROLOGIC RH(D): CPT

## 2021-08-09 PROCEDURE — 81001 URINALYSIS AUTO W/SCOPE: CPT

## 2021-08-09 PROCEDURE — 86900 BLOOD TYPING SEROLOGIC ABO: CPT

## 2021-08-09 PROCEDURE — 86850 RBC ANTIBODY SCREEN: CPT

## 2021-08-09 PROCEDURE — 85025 COMPLETE CBC W/AUTO DIFF WBC: CPT

## 2021-08-10 DIAGNOSIS — Z01.818 PREOP TESTING: Primary | ICD-10-CM

## 2021-08-11 ENCOUNTER — ANESTHESIA EVENT (OUTPATIENT)
Dept: OPERATING ROOM | Age: 71
End: 2021-08-11
Payer: MEDICARE

## 2021-08-11 ENCOUNTER — ANESTHESIA (OUTPATIENT)
Dept: OPERATING ROOM | Age: 71
End: 2021-08-11
Payer: MEDICARE

## 2021-08-11 ENCOUNTER — HOSPITAL ENCOUNTER (OUTPATIENT)
Age: 71
Setting detail: OUTPATIENT SURGERY
Discharge: HOME | End: 2021-08-11
Attending: OBSTETRICS & GYNECOLOGY | Admitting: OBSTETRICS & GYNECOLOGY
Payer: MEDICARE

## 2021-08-11 VITALS
HEIGHT: 68 IN | BODY MASS INDEX: 33.8 KG/M2 | WEIGHT: 223 LBS | OXYGEN SATURATION: 96 % | SYSTOLIC BLOOD PRESSURE: 132 MMHG | TEMPERATURE: 98 F | RESPIRATION RATE: 16 BRPM | HEART RATE: 60 BPM | DIASTOLIC BLOOD PRESSURE: 63 MMHG

## 2021-08-11 VITALS
OXYGEN SATURATION: 98 % | TEMPERATURE: 96.4 F | DIASTOLIC BLOOD PRESSURE: 45 MMHG | SYSTOLIC BLOOD PRESSURE: 98 MMHG | RESPIRATION RATE: 10 BRPM

## 2021-08-11 PROBLEM — Z98.890 HISTORY OF D&C: Status: ACTIVE | Noted: 2021-08-11

## 2021-08-11 PROCEDURE — 7100000010 HC PHASE II RECOVERY - FIRST 15 MIN: Performed by: OBSTETRICS & GYNECOLOGY

## 2021-08-11 PROCEDURE — 2580000003 HC RX 258: Performed by: OBSTETRICS & GYNECOLOGY

## 2021-08-11 PROCEDURE — C1776 JOINT DEVICE (IMPLANTABLE): HCPCS | Performed by: OBSTETRICS & GYNECOLOGY

## 2021-08-11 PROCEDURE — 58558 HYSTEROSCOPY BIOPSY: CPT | Performed by: OBSTETRICS & GYNECOLOGY

## 2021-08-11 PROCEDURE — 6360000002 HC RX W HCPCS: Performed by: OBSTETRICS & GYNECOLOGY

## 2021-08-11 PROCEDURE — 88305 TISSUE EXAM BY PATHOLOGIST: CPT

## 2021-08-11 PROCEDURE — 3600000002 HC SURGERY LEVEL 2 BASE: Performed by: OBSTETRICS & GYNECOLOGY

## 2021-08-11 PROCEDURE — 2709999900 HC NON-CHARGEABLE SUPPLY: Performed by: OBSTETRICS & GYNECOLOGY

## 2021-08-11 PROCEDURE — 3700000001 HC ADD 15 MINUTES (ANESTHESIA): Performed by: OBSTETRICS & GYNECOLOGY

## 2021-08-11 PROCEDURE — 7100000011 HC PHASE II RECOVERY - ADDTL 15 MIN: Performed by: OBSTETRICS & GYNECOLOGY

## 2021-08-11 PROCEDURE — 3600000012 HC SURGERY LEVEL 2 ADDTL 15MIN: Performed by: OBSTETRICS & GYNECOLOGY

## 2021-08-11 PROCEDURE — 3700000000 HC ANESTHESIA ATTENDED CARE: Performed by: OBSTETRICS & GYNECOLOGY

## 2021-08-11 PROCEDURE — 7100000000 HC PACU RECOVERY - FIRST 15 MIN: Performed by: OBSTETRICS & GYNECOLOGY

## 2021-08-11 PROCEDURE — 6360000002 HC RX W HCPCS: Performed by: NURSE ANESTHETIST, CERTIFIED REGISTERED

## 2021-08-11 PROCEDURE — 2500000003 HC RX 250 WO HCPCS: Performed by: NURSE ANESTHETIST, CERTIFIED REGISTERED

## 2021-08-11 PROCEDURE — 7100000001 HC PACU RECOVERY - ADDTL 15 MIN: Performed by: OBSTETRICS & GYNECOLOGY

## 2021-08-11 RX ORDER — GLYCOPYRROLATE 1 MG/5 ML
SYRINGE (ML) INTRAVENOUS PRN
Status: DISCONTINUED | OUTPATIENT
Start: 2021-08-11 | End: 2021-08-11 | Stop reason: SDUPTHER

## 2021-08-11 RX ORDER — SODIUM CHLORIDE 0.9 % (FLUSH) 0.9 %
10 SYRINGE (ML) INJECTION EVERY 12 HOURS SCHEDULED
Status: DISCONTINUED | OUTPATIENT
Start: 2021-08-11 | End: 2021-08-11 | Stop reason: HOSPADM

## 2021-08-11 RX ORDER — PROPOFOL 10 MG/ML
INJECTION, EMULSION INTRAVENOUS PRN
Status: DISCONTINUED | OUTPATIENT
Start: 2021-08-11 | End: 2021-08-11 | Stop reason: SDUPTHER

## 2021-08-11 RX ORDER — ONDANSETRON 2 MG/ML
INJECTION INTRAMUSCULAR; INTRAVENOUS PRN
Status: DISCONTINUED | OUTPATIENT
Start: 2021-08-11 | End: 2021-08-11 | Stop reason: SDUPTHER

## 2021-08-11 RX ORDER — DEXAMETHASONE SODIUM PHOSPHATE 10 MG/ML
INJECTION INTRAMUSCULAR; INTRAVENOUS PRN
Status: DISCONTINUED | OUTPATIENT
Start: 2021-08-11 | End: 2021-08-11 | Stop reason: SDUPTHER

## 2021-08-11 RX ORDER — SODIUM CHLORIDE 0.9 % (FLUSH) 0.9 %
10 SYRINGE (ML) INJECTION PRN
Status: DISCONTINUED | OUTPATIENT
Start: 2021-08-11 | End: 2021-08-11 | Stop reason: HOSPADM

## 2021-08-11 RX ORDER — FENTANYL CITRATE 50 UG/ML
INJECTION, SOLUTION INTRAMUSCULAR; INTRAVENOUS PRN
Status: DISCONTINUED | OUTPATIENT
Start: 2021-08-11 | End: 2021-08-11 | Stop reason: SDUPTHER

## 2021-08-11 RX ORDER — SODIUM CHLORIDE, SODIUM LACTATE, POTASSIUM CHLORIDE, CALCIUM CHLORIDE 600; 310; 30; 20 MG/100ML; MG/100ML; MG/100ML; MG/100ML
INJECTION, SOLUTION INTRAVENOUS CONTINUOUS
Status: DISCONTINUED | OUTPATIENT
Start: 2021-08-11 | End: 2021-08-11 | Stop reason: HOSPADM

## 2021-08-11 RX ORDER — SODIUM CHLORIDE 9 MG/ML
25 INJECTION, SOLUTION INTRAVENOUS PRN
Status: DISCONTINUED | OUTPATIENT
Start: 2021-08-11 | End: 2021-08-11 | Stop reason: HOSPADM

## 2021-08-11 RX ORDER — LIDOCAINE HYDROCHLORIDE 20 MG/ML
INJECTION, SOLUTION EPIDURAL; INFILTRATION; INTRACAUDAL; PERINEURAL PRN
Status: DISCONTINUED | OUTPATIENT
Start: 2021-08-11 | End: 2021-08-11 | Stop reason: SDUPTHER

## 2021-08-11 RX ADMIN — PROPOFOL 150 MG: 10 INJECTION, EMULSION INTRAVENOUS at 09:39

## 2021-08-11 RX ADMIN — DEXAMETHASONE SODIUM PHOSPHATE 10 MG: 10 INJECTION INTRAMUSCULAR; INTRAVENOUS at 09:39

## 2021-08-11 RX ADMIN — SODIUM CHLORIDE, POTASSIUM CHLORIDE, SODIUM LACTATE AND CALCIUM CHLORIDE: 600; 310; 30; 20 INJECTION, SOLUTION INTRAVENOUS at 09:19

## 2021-08-11 RX ADMIN — ONDANSETRON 4 MG: 2 INJECTION INTRAMUSCULAR; INTRAVENOUS at 09:53

## 2021-08-11 RX ADMIN — FENTANYL CITRATE 50 MCG: 50 INJECTION, SOLUTION INTRAMUSCULAR; INTRAVENOUS at 09:45

## 2021-08-11 RX ADMIN — LIDOCAINE HYDROCHLORIDE 100 MG: 20 INJECTION, SOLUTION EPIDURAL; INFILTRATION; INTRACAUDAL; PERINEURAL at 09:39

## 2021-08-11 RX ADMIN — Medication 0.4 MG: at 09:39

## 2021-08-11 RX ADMIN — Medication 2 G: at 09:31

## 2021-08-11 RX ADMIN — FENTANYL CITRATE 50 MCG: 50 INJECTION, SOLUTION INTRAMUSCULAR; INTRAVENOUS at 09:39

## 2021-08-11 ASSESSMENT — PAIN DESCRIPTION - ORIENTATION
ORIENTATION: LOWER

## 2021-08-11 ASSESSMENT — PAIN DESCRIPTION - LOCATION
LOCATION: ABDOMEN

## 2021-08-11 ASSESSMENT — PAIN - FUNCTIONAL ASSESSMENT: PAIN_FUNCTIONAL_ASSESSMENT: 0-10

## 2021-08-11 ASSESSMENT — PAIN DESCRIPTION - DESCRIPTORS
DESCRIPTORS: CRAMPING

## 2021-08-11 ASSESSMENT — PULMONARY FUNCTION TESTS
PIF_VALUE: 4
PIF_VALUE: 14
PIF_VALUE: 20
PIF_VALUE: 3
PIF_VALUE: 6
PIF_VALUE: 14
PIF_VALUE: 18
PIF_VALUE: 16
PIF_VALUE: 21
PIF_VALUE: 3
PIF_VALUE: 3
PIF_VALUE: 19
PIF_VALUE: 16
PIF_VALUE: 3
PIF_VALUE: 4
PIF_VALUE: 16
PIF_VALUE: 21
PIF_VALUE: 17
PIF_VALUE: 19
PIF_VALUE: 4
PIF_VALUE: 17
PIF_VALUE: 17
PIF_VALUE: 20
PIF_VALUE: 15
PIF_VALUE: 3
PIF_VALUE: 14
PIF_VALUE: 19

## 2021-08-11 ASSESSMENT — PAIN SCALES - GENERAL
PAINLEVEL_OUTOF10: 0
PAINLEVEL_OUTOF10: 4
PAINLEVEL_OUTOF10: 0
PAINLEVEL_OUTOF10: 4

## 2021-08-11 ASSESSMENT — PAIN DESCRIPTION - PAIN TYPE
TYPE: SURGICAL PAIN
TYPE: SURGICAL PAIN

## 2021-08-11 NOTE — ANESTHESIA PRE PROCEDURE
Department of Anesthesiology  Preprocedure Note       Name:  Kaylie Kurtz   Age:  69 y.o.  :  1950                                          MRN:  [id number]         Date:  2021      Surgeon: Klever Pedroza):  Telma Elizondo DO    Procedure: Procedure(s):  D & C HYSTEROSCOPY    Medications prior to admission:   Prior to Admission medications    Medication Sig Start Date End Date Taking?  Authorizing Provider   FLUoxetine (PROZAC) 40 MG capsule take 1 capsule by mouth once daily 21  Yes Toma Rocha MD   montelukast (SINGULAIR) 10 MG tablet take 1 tablet by mouth every evening 21  Yes Toma Rocha MD   pantoprazole (PROTONIX) 40 MG tablet take 1 tablet by mouth once daily 21  Yes Toma Rocha MD   cholestyramine Lisa Ralf) 4 g packet take 1 packet ( 4 grams ) by mouth once daily 5/10/21  Yes Toma Rocha MD   propranolol (INDERAL LA) 60 MG extended release capsule take 1 capsule by mouth once daily 3/8/21  Yes Toma Rocha MD   hydrOXYzine (ATARAX) 25 MG tablet Take 1 tablet by mouth nightly as needed (intersitial cystitis) 21  Yes Toma Rocha MD   buPROPion (WELLBUTRIN XL) 150 MG extended release tablet Take 1 tablet by mouth every morning 10/22/20  Yes Toma Rocha MD   NONFORMULARY Take 1 capsule by mouth daily Cholesterol 360   Yes Historical Provider, MD   Multiple Vitamins-Minerals (PRESERVISION AREDS 2) CHEW Take 1 each by mouth 2 times daily   Yes Historical Provider, MD   VITAMIN E PO Take 1 capsule by mouth daily    Yes Historical Provider, MD   Ascorbic Acid (VITAMIN C PO) Take 1 tablet by mouth daily   Yes Historical Provider, MD   Cholecalciferol (VITAMIN D3 PO) Take 1 tablet by mouth daily   Yes Historical Provider, MD   BIOTIN PO Take 1 tablet by mouth daily   Yes Historical Provider, MD   CALCIUM-MAGNESIUM-VITAMIN D PO Take by mouth   Yes Historical Provider, MD   fluticasone (FLONASE) 50 MCG/ACT nasal spray by Nasal route    Yes Historical Packs/day: 1.00     Years: 22.00     Pack years: 22.00     Types: Cigarettes     Start date: 1975     Quit date: 1993     Years since quittin.7    Smokeless tobacco: Never Used   Substance Use Topics    Alcohol use: Yes     Alcohol/week: 2.0 standard drinks     Types: 2 Glasses of wine per week                                Counseling given: Not Answered      Vital Signs (Current):   Vitals:    21 0852   BP: 138/64   Pulse: 62   Resp: 16   Temp: 37.2 °C (98.9 °F)   TempSrc: Oral   SpO2: 96%   Weight: 223 lb (101.2 kg)   Height: 5' 8\" (1.727 m)                                              BP Readings from Last 3 Encounters:   21 138/64   21 124/86   21 120/82       NPO Status: Time of last liquid consumption:                         Time of last solid consumption:                         Date of last liquid consumption: 08/10/21                        Date of last solid food consumption: 08/10/21    BMI:   Wt Readings from Last 3 Encounters:   21 223 lb (101.2 kg)   21 224 lb 4 oz (101.7 kg)   21 222 lb (100.7 kg)     Body mass index is 33.91 kg/m². CBC:   Lab Results   Component Value Date    WBC 9.5 2021    RBC 4.45 2021    RBC 4.54 2012    HGB 13.7 2021    HCT 42.5 2021    MCV 95.5 2021    RDW 13.4 2021     2021       CMP:   Lab Results   Component Value Date     2019    K 4.9 2019     2019    CO2 30 2019    BUN 19 2019    CREATININE 0.65 2019    GFRAA >60 2019    LABGLOM >60 2019    LABGLOM 73 2012    GLUCOSE 108 2019    GLUCOSE 105 2012    PROT 7.1 2018    CALCIUM 9.6 2019    BILITOT 0.46 2018    ALKPHOS 72 2018    AST 21 2018    ALT 20 2018       POC Tests: No results for input(s): POCGLU, POCNA, POCK, POCCL, POCBUN, POCHEMO, POCHCT in the last 72 hours.     Coags: No results found for: PROTIME, INR, APTT    HCG (If Applicable): No results found for: PREGTESTUR, PREGSERUM, HCG, HCGQUANT     ABGs: No results found for: PHART, PO2ART, EUR5ITV, QHX3QJG, BEART, H4KLQVZS     Type & Screen (If Applicable):  No results found for: LABABO, LABRH    Drug/Infectious Status (If Applicable):  Lab Results   Component Value Date    HEPCAB NONREACTIVE 05/31/2018       COVID-19 Screening (If Applicable):   Lab Results   Component Value Date    COVID19 Not Detected 08/06/2021           Anesthesia Evaluation  Patient summary reviewed and Nursing notes reviewed no history of anesthetic complications:   Airway: Mallampati: II  TM distance: >3 FB   Neck ROM: full  Mouth opening: > = 3 FB Dental: normal exam         Pulmonary:normal exam  breath sounds clear to auscultation  (+) sleep apnea: on noncompliant,  asthma: seasonal asthma,           Patient did not smoke on day of surgery. Cardiovascular:Negative CV ROS  Exercise tolerance: good (>4 METS),           Rhythm: regular  Rate: normal           Beta Blocker:  Not on Beta Blocker         Neuro/Psych:   (+) neuromuscular disease:, headaches: migraine headaches, psychiatric history: stable with treatmentdepression/anxiety             GI/Hepatic/Renal:   (+) GERD: well controlled,           Endo/Other: Negative Endo/Other ROS             Pt had no PAT visit       Abdominal:       Abdomen: soft. Vascular: negative vascular ROS. Other Findings:             Anesthesia Plan      general     ASA 2       Induction: intravenous. MIPS: Postoperative opioids intended and Prophylactic antiemetics administered. Anesthetic plan and risks discussed with patient. Plan discussed with attending, surgical team and CRNA.                   LETITIA Ramsey - CRNA   8/11/2021

## 2021-08-11 NOTE — H&P
Gynecologic Surgery Pre-Operative Attestation Note:        Facility: [facility] AT [place]  Date: 2021  Time: 9:25 AM      Patient Name: Gay Grayson  Patient : 1950  Room/Bed: [address] [place] [address] OR POOL/NONE  Admission Date/Time: 2021  8:26 AM  MRN #: [id number]  [facility] #: [id number]            Attending Physician Statement  I have discussed and reviewed the care of Gay Grayson, including pertinent history and exam findings. I have reviewed the note in the electronic medical record. I have seen and examined the patient in the pre-op holding area and the key elements of all parts of the encounter have been performed/reviewed by me . This was completed more than 15 minutes prior to the scheduled surgical start time per the new start time policy. I agree with the assessment, plan and orders as documented. The procedure risks and complications were discussed as well as the possibility of the need for a second surgery and incomplete removal of abnormal tissue. The patient voiced understanding. Vitals:    21 0852   BP: 138/64   Pulse: 62   Resp: 16   Temp: 98.9 °F (37.2 °C)   TempSrc: Oral   SpO2: 96%   Weight: 223 lb (101.2 kg)   Height: 5' 8\" (1.727 m)         Hospital Outpatient Visit on 2021   Component Date Value Ref Range Status    SARS-CoV-2 2021        Final    Source 2021 . NASOPHARYNGEAL SWAB   Final    SARS-CoV-2 2021 Not Detected  Not Detected Final    Comment:       The specimen is NEGATIVE for SARS-CoV-2, the novel coronavirus associated with COVID-19. A negative result does not rule out COVID-19. Farhat SARS-CoV-2 for use on the Farhat [employer] is a real-time RT-PCR test intended   for the qualitative detection of nucleic acids from SARS-CoV-2  in clinician-collected nasal, nasopharyngeal, and oropharyngeal swab specimens from   individuals who meet COVID-19 clinical and/or epidemiological criteria.   Farhat SARS-CoV-2 is for use only under Emergency Use Authorization (EUA) in laboratories   certified under [address] (CLIA), 42 U. S.C. §073O,   that meet requirements to perform high or moderate complexity tests. An individual without symptoms of COVID-19 and who is not shedding SARS-CoV-2 virus would   expect to have a negative (not detected) result in this assay.   Fact sheet for Healthcare Providers: Song.es  Fact sheet for Patients: https://www.                           fda.gov/media/132242/download        METHODOLOGY: RT-PCR     Hospital Outpatient Visit on 08/09/2021   Component Date Value Ref Range Status    Expiration Date 08/09/2021 08/12/2021,2359   Final    Arm Band Number 08/09/2021 BE [id number]   Final    ABO/Rh 08/09/2021 A NEGATIVE   Final    Antibody Screen 08/09/2021 NEGATIVE   Final    WBC 08/09/2021 9.5  3.5 - 11.3 k/uL Final    RBC 08/09/2021 4.45  3.95 - 5.11 m/uL Final    Hemoglobin 08/09/2021 13.7  11.9 - 15.1 g/dL Final    Hematocrit 08/09/2021 42.5  36.3 - 47.1 % Final    MCV 08/09/2021 95.5  82.6 - 102.9 fL Final    MCH 08/09/2021 30.8  25.2 - 33.5 pg Final    MCHC 08/09/2021 32.2  25.2 - 33.5 g/dL Final    RDW 08/09/2021 13.4  11.8 - 14.4 % Final    Platelets 39/15/0589 227  138 - 453 k/uL Final    MPV 08/09/2021 10.9  8.1 - 13.5 fL Final    NRBC Automated 08/09/2021 0.0  0.0 per 100 WBC Final    Differential Type 08/09/2021 NOT REPORTED   Final    Seg Neutrophils 08/09/2021 62  36 - 65 % Final    Lymphocytes 08/09/2021 27  24 - 43 % Final    Monocytes 08/09/2021 7  3 - 12 % Final    Eosinophils % 08/09/2021 3  1 - 4 % Final    Basophils 08/09/2021 1  0 - 2 % Final    Immature Granulocytes 08/09/2021 0  0 % Final    Segs Absolute 08/09/2021 5.96  1.50 - 8.10 k/uL Final    Absolute Lymph # 08/09/2021 2.54  1.10 - 3.70 k/uL Final    Absolute Mono # 08/09/2021 0.68  0.10 - 1.20 k/uL Final    Absolute Eos # 08/09/2021 0.26  0.00 - 0.44 k/uL Final    Basophils Absolute 08/09/2021 0.06  0.00 - 0.20 k/uL Final    Absolute Immature Granulocyte 08/09/2021 0.04  0.00 - 0.30 k/uL Final    WBC Morphology 08/09/2021 NOT REPORTED   Final    RBC Morphology 08/09/2021 NOT REPORTED   Final    Platelet Estimate 60/43/9114 NOT REPORTED   Final    Color, UA 08/09/2021 NOT REPORTED  YELLOW Final    Turbidity UA 08/09/2021 NOT REPORTED  CLEAR Final    Glucose, Ur 08/09/2021 NEGATIVE  NEGATIVE Final    Bilirubin Urine 08/09/2021 NEGATIVE  NEGATIVE Final    Ketones, Urine 08/09/2021 NEGATIVE  NEGATIVE Final    Specific Gravity, UA 08/09/2021 1.015  1.010 - 1.025 Final    Urine Hgb 08/09/2021 NEGATIVE  NEGATIVE Final    pH, UA 08/09/2021 5.5  5.0 - 6.0 Final    Protein, UA 08/09/2021 NEGATIVE  NEGATIVE Final    Urobilinogen, Urine 08/09/2021 Normal  Normal Final    Nitrite, Urine 08/09/2021 NEGATIVE  NEGATIVE Final    Leukocyte Esterase, Urine 08/09/2021 NEGATIVE  NEGATIVE Final    Urinalysis Comments 08/09/2021 NOT REPORTED   Final    - 08/09/2021        Final    WBC, UA 08/09/2021 0 TO 4  0 - 4 /HPF Final    RBC, UA 08/09/2021 None  0 - 4 /HPF Final    Casts UA 08/09/2021 NOT REPORTED  0 - 2 /LPF Final    Crystals, UA 08/09/2021 NOT REPORTED  None /HPF Final    Epithelial Cells UA 08/09/2021 0 TO 4  0 - 5 /HPF Final    Renal Epithelial, UA 08/09/2021 NOT REPORTED  0 /HPF Final    Bacteria, UA 08/09/2021 TRACE* None Final    Mucus, UA 08/09/2021 NOT REPORTED  None Final    Trichomonas, UA 08/09/2021 NOT REPORTED  None Final    Amorphous, UA 08/09/2021 NOT REPORTED  None Final    Other Observations UA 08/09/2021 NOT REPORTED  NOT REQ. Final    Yeast, UA 08/09/2021 NOT REPORTED  None Final   ]      The patient was counseled at length about the risks of maria fernanda Covid-19 in the eliseo-operative and post-operative states including the recovery window of their procedure.   The patient was made aware that maria fernanda Covid-19 after a surgical procedure may worsen their prognosis for recovering from the virus and lend to a higher morbidity and or mortality risk. The patient was given the options of postponing their procedure. All of the risks, benefits, and alternatives were discussed. The patient  does wish to proceed with the procedure. Assessment:       Diagnosis Orders   1. PMB (postmenopausal bleeding)                  Patient Active Problem List     Diagnosis Date Noted    Chronic migraine without aura with status migrainosus, not intractable 05/18/2021    Mild episode of recurrent major depressive disorder ([facility] [address] 10/22/2020    Aftercare following joint replacement surgery 11/12/2019    Artificial knee joint present 11/12/2019    Primary localized osteoarthrosis of ankle and foot 11/12/2019    Atrial flutter ([facility] [address] 05/14/2019    Postmenopausal bleeding 02/07/2019    Intramural leiomyoma of uterus 02/07/2019    Intrinsic eczema 11/01/2018    Vitamin D deficiency 12/22/2016    Sleep apnea         Overview Note:       Updating Deprecated Diagnoses       Fibromyalgia      GERD (gastroesophageal reflux disease)      Depression      Arthritis of knee, degenerative 12/16/2014    Osteoarthritis of left knee 12/02/2014    Pancreatic cyst 07/29/2014    Diverticulosis of colon 06/12/2014    Myalgia and myositis 11/12/2012    Anxiety state 10/15/2012    Backache 09/12/2012    Allergic rhinitis 08/24/2012    Chronic ethmoidal sinusitis 01/04/2012    Chronic frontal sinusitis 01/04/2012    Nasal vestibulitis 01/04/2012    Epistaxis 01/04/2012    Nasal septal deviation 01/04/2012    Nasal turbinate hypertrophy 01/04/2012    Chronic sphenoidal sinusitis 01/04/2012    Chronic cough 11/07/2011    Chronic maxillary sinusitis 11/07/2011    Asthma 07/22/2011    Hyperlipidemia 10/12/2006            Permanent Sterilization Completed: Yes      Plan:  1.  D&C Hysteroscopy  2.   Bilateral KNEE REPLACEMENTS-Position while AWAKE  3. Consent read to pt before signing and all questions answered  No orders of the defined types were placed in this encounter.           Counseling: The patient was counseled on all options both medical and surgical, conservative as well as definitive. She has elected to proceed with the procedure as stated above.     The patient was counseled on the procedure. Risks and complications were reviewed in detail. The patients orders, labs, consents have been completed. The history and physical as well as all supporting surgical documentation will be forwarded to the pre-operative holding area.     The patient is aware that this procedure may not alleviate her symptoms. That there may be a necessity for a second surgery and that there may be an incomplete removal of abnormal tissue.               Home care, Restrictions & Follow up Care review completed    Planned Office Follow up was reviewed with the patient and her family and is for  2 weeks. The patient will call to make this appointment unless she already has done so.

## 2021-08-11 NOTE — OP NOTE
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    Procedure: Procedure(s):  D & C HYSTEROSCOPY suspected polypectomy    Surgeon: Shweta Gonzales DO    Assistant:   SAUD    Circulator Documentation of Staff:  Surgeon(s) and Role:     * Shweta Gonzales DO - Primary    OR Staff:  Scrub Person First: Sachin Martínez  Scrub Person Second: Kimberli Castro, LPN      Anesthesia Type: General  Anesthesia Staff: CRNA: Lin Sotelo, LETITIA - CRNA      Complications: None      Estimated Blood Loss: 10 ml  Total IV Fluids:  300 ml  Urine Output: 100 ml clear    Distention Media: NS  (Accurate I/O at the completion of the procedure)    Specimens:   ID Type Source Tests Collected by Time Destination   A : Endometial samplets, suspected polyp Tissue Endometrium SURGICAL PATHOLOGY Shweta Gonzales DO 2021 0954      Implants: * No implants in log *    Drains: * No LDAs found *      Condition: Stable    Findings: AV uterus sound to 8 cm. + Polyp fundal and floor at internal ring. Fundal 5 mm and floor 2-3 mm. BL ostia visualized. No fibroid changes identified. Description of Procedure: (Understanding of limitations from template op-reports exist)  The patient was seen in the pre-operative area. The procedure risk and complications were reviewed. The consent , labs , and H&P were reviewed. The patient had all of her questions answered. The patient was moved to the operative suite where she was placed under general anesthesia by the anesthesiologist after being positioned while awake d/t her BL knee replacements. Preop ABX given. .  Time out was preformed. Positioned using  Yello-fin stirrups and prepped and draped in the normal sterile fashion.   EPC's were in place and operating correctly. Her bladder was drained with a red griggs catheter. A weighted speculum was placed along the posterior vaginal wall and the anterior lip of the cervix was visualized and grasped with an Misael's Tenaculum. The uterus was sounded to 8 cm. The cervix was dilated slightly using hegar dilators to a # 7 and the hysteroscope was introduced through the endocervical canal into the uterine cavity. Intra-operative video was completed. Findings noted above. The hysteroscope was removed and a sharp curette was introduced. Circumferential scrapings of the uterine cavity were completed. Attention was then turned to the two areas of the polyps and they were hooked and removed. This was sent to pathology. The hysteroscope was replaced and there was noted to be no perforation of the uterus. The areas of the polyps were clean. The hysteroscope was removed, along with the Misael's Tenaculum and there was noted to be adequate hemostasis. The weighted speculum was removed. The patient tolerated the procedure well, and she was taken to PACU in stable condition. Sponge, needle and instrument counts were called for and were correct x 3. Disposition:  The patient will return to my office in 2 weeks-VV is ok. We will review her pathology report and the intraoperative video along with any further recommendations. She was counseled with her family that she is to report any temperature more than 100.4 F, pelvic pain, or heavy vaginal bleeding; She is to refrain from intercourse douching or tampons; No hot tubs baths lakes or pools. The patient voiced understanding of the above counseling.       Kavon Lee DO      Electronically signed by Kavon Lee DO on 8/11/21 at 9:57 AM EDT

## 2021-08-11 NOTE — ANESTHESIA POSTPROCEDURE EVALUATION
Department of Anesthesiology  Postprocedure Note    Patient: Nayla Parsons  MRN: [id number]  [place]: 1950  Date of evaluation: 8/11/2021  Time:  10:05 AM     Procedure Summary     Date: 08/11/21 Room / Location: [address] [place] [address]    Anesthesia Start: 0930 Anesthesia Stop: 1005    Procedure: D & C HYSTEROSCOPY WITH POLYPECTOMY (N/A ) Diagnosis: (postmenopausal bleeding)    Surgeons: Yuko Jameson DO Responsible Provider: LETITIA De La Fuente CRNA    Anesthesia Type: general ASA Status: 2          Anesthesia Type: general    Shefali Phase I: Shefali Score: 9    Shefali Phase II:      Last vitals: Reviewed and per EMR flowsheets.        Anesthesia Post Evaluation    Patient location during evaluation: PACU  Patient participation: complete - patient participated  Level of consciousness: awake, awake and alert and responsive to light touch  Pain score: 0  Airway patency: patent  Nausea & Vomiting: no nausea and no vomiting  Complications: no  Cardiovascular status: blood pressure returned to baseline and hemodynamically stable  Respiratory status: acceptable  Hydration status: euvolemic

## 2021-08-13 LAB — SURGICAL PATHOLOGY REPORT: NORMAL

## 2021-09-01 NOTE — TELEPHONE ENCOUNTER
[facility] called requesting a refill of the below medication which has been pended for you:     Requested Prescriptions     Pending Prescriptions Disp Refills    propranolol (INDERAL LA) 60 MG extended release capsule [Pharmacy Med Name: PROPRANOLOL ER 60 MG CAPSULE] 90 capsule 0     Sig: take 1 capsule by mouth once daily       Last Appointment Date: 5/18/2021  Next Appointment Date: Visit date not found    Allergies   Allergen Reactions    Celebrex [Celecoxib] Rash     Whole body    Codeine Other (See Comments)     Gives her a headache

## 2021-09-02 RX ORDER — PROPRANOLOL HCL 60 MG
CAPSULE, EXTENDED RELEASE 24HR ORAL
Qty: 90 CAPSULE | Refills: 0 | Status: SHIPPED | OUTPATIENT
Start: 2021-09-02 | End: 2021-09-15 | Stop reason: SDUPTHER

## 2021-09-14 NOTE — TELEPHONE ENCOUNTER
Pt stated she would like to get all of her medications sent to [facility] mail delivery service, IWF:[phone], phone:[phone].  Also she stated she is no longer taking the cholestyramine 4g packet

## 2021-09-15 RX ORDER — PANTOPRAZOLE SODIUM 40 MG/1
TABLET, DELAYED RELEASE ORAL
Qty: 90 TABLET | Refills: 1 | Status: SHIPPED | OUTPATIENT
Start: 2021-09-15 | End: 2022-01-09 | Stop reason: SDUPTHER

## 2021-09-15 RX ORDER — BUPROPION HYDROCHLORIDE 150 MG/1
150 TABLET ORAL EVERY MORNING
Qty: 90 TABLET | Refills: 1 | Status: SHIPPED | OUTPATIENT
Start: 2021-09-15 | End: 2022-03-04

## 2021-09-15 RX ORDER — FLUOXETINE HYDROCHLORIDE 40 MG/1
CAPSULE ORAL
Qty: 90 CAPSULE | Refills: 1 | Status: SHIPPED | OUTPATIENT
Start: 2021-09-15 | End: 2022-03-04

## 2021-09-15 RX ORDER — PROPRANOLOL HCL 60 MG
CAPSULE, EXTENDED RELEASE 24HR ORAL
Qty: 90 CAPSULE | Refills: 1 | Status: SHIPPED | OUTPATIENT
Start: 2021-09-15 | End: 2022-03-04

## 2021-09-15 RX ORDER — MONTELUKAST SODIUM 10 MG/1
TABLET ORAL
Qty: 90 TABLET | Refills: 1 | Status: SHIPPED | OUTPATIENT
Start: 2021-09-15 | End: 2022-03-04

## 2021-09-15 RX ORDER — HYDROXYZINE HYDROCHLORIDE 25 MG/1
25 TABLET, FILM COATED ORAL NIGHTLY PRN
Qty: 90 TABLET | Refills: 1 | Status: SHIPPED | OUTPATIENT
Start: 2021-09-15 | End: 2022-02-16

## 2021-09-30 ENCOUNTER — TELEPHONE (OUTPATIENT)
Dept: CT IMAGING | Age: 71
End: 2021-09-30

## 2021-09-30 NOTE — TELEPHONE ENCOUNTER
There is an order for Cecilia Kelley to have a dexa scan. She stated that she does not want to have this done. Please advise, and delete order if acceptable.   Thanks,  [facility]  Radiology

## 2021-12-28 ENCOUNTER — TELEPHONE (OUTPATIENT)
Dept: OBGYN | Age: 71
End: 2021-12-28

## 2022-01-04 NOTE — TELEPHONE ENCOUNTER
Order does not get deleted as it is recommended it has an expiration date on it if not completed  Document after speaking to the pt and reviewing why this is beneficial whether she will or will not complete. Yes

## 2022-01-10 RX ORDER — PANTOPRAZOLE SODIUM 40 MG/1
TABLET, DELAYED RELEASE ORAL
Qty: 90 TABLET | Refills: 1 | Status: SHIPPED | OUTPATIENT
Start: 2022-01-10 | End: 2022-03-04

## 2022-02-16 RX ORDER — FLUOXETINE HYDROCHLORIDE 40 MG/1
CAPSULE ORAL
Qty: 90 CAPSULE | Refills: 0 | OUTPATIENT
Start: 2022-02-16

## 2022-02-16 RX ORDER — BUPROPION HYDROCHLORIDE 150 MG/1
TABLET ORAL
Qty: 90 TABLET | Refills: 0 | OUTPATIENT
Start: 2022-02-16

## 2022-02-16 RX ORDER — MONTELUKAST SODIUM 10 MG/1
TABLET ORAL
Qty: 90 TABLET | Refills: 0 | OUTPATIENT
Start: 2022-02-16

## 2022-02-16 RX ORDER — HYDROXYZINE HYDROCHLORIDE 25 MG/1
TABLET, FILM COATED ORAL
Qty: 90 TABLET | Refills: 0 | Status: SHIPPED | OUTPATIENT
Start: 2022-02-16

## 2022-02-16 RX ORDER — PROPRANOLOL HCL 60 MG
CAPSULE, EXTENDED RELEASE 24HR ORAL
Qty: 90 CAPSULE | Refills: 0 | OUTPATIENT
Start: 2022-02-16

## 2022-02-16 NOTE — TELEPHONE ENCOUNTER
Janeth Dow called requesting a refill of the below medication which has been pended for you:     Requested Prescriptions     Pending Prescriptions Disp Refills    montelukast (SINGULAIR) 10 MG tablet [Pharmacy Med Name: MONTELUKAST SODIUM 10 MG Tablet] 90 tablet 1     Sig: TAKE 1 TABLET EVERY EVENING    buPROPion (WELLBUTRIN XL) 150 MG extended release tablet [Pharmacy Med Name: BUPROPION HYDROCHLORIDE ER (XL) 150 MG Tablet Extended Release 24 Hour] 90 tablet 1     Sig: TAKE 1 TABLET EVERY MORNING    FLUoxetine (PROZAC) 40 MG capsule [Pharmacy Med Name: FLUOXETINE HYDROCHLORIDE 40 MG Capsule] 90 capsule 1     Sig: TAKE 1 CAPSULE EVERY DAY    propranolol (INDERAL LA) 60 MG extended release capsule [Pharmacy Med Name: PROPRANOLOL HYDROCHLORIDEER 60 MG Capsule Extended Release 24 Hour] 90 capsule 1     Sig: TAKE 1 CAPSULE EVERY DAY    hydrOXYzine (ATARAX) 25 MG tablet [Pharmacy Med Name: HYDROXYZINE HYDROCHLORIDE 25 MG Tablet] 90 tablet 1     Sig: TAKE 1 TABLET BY MOUTH NIGHTLY AS NEEDED (INTERSTITIAL CYSTITIS)       Last Appointment Date: 5/18/2021  Next Appointment Date: Visit date not found    Allergies   Allergen Reactions    Celebrex [Celecoxib] Rash     Whole body    Codeine Other (See Comments)     Gives her a headache

## 2022-02-16 NOTE — TELEPHONE ENCOUNTER
Per pt, did not want anything refilled except her atarax. Per pt, she quit taking all her meds at the beginning of the year.

## 2022-03-04 ENCOUNTER — OFFICE VISIT (OUTPATIENT)
Dept: FAMILY MEDICINE CLINIC | Age: 72
End: 2022-03-04
Payer: MEDICARE

## 2022-03-04 VITALS
SYSTOLIC BLOOD PRESSURE: 122 MMHG | DIASTOLIC BLOOD PRESSURE: 70 MMHG | OXYGEN SATURATION: 94 % | HEART RATE: 63 BPM | WEIGHT: 211 LBS | BODY MASS INDEX: 31.98 KG/M2 | TEMPERATURE: 97.9 F | HEIGHT: 68 IN

## 2022-03-04 DIAGNOSIS — I48.92 ATRIAL FLUTTER, UNSPECIFIED TYPE (HCC): ICD-10-CM

## 2022-03-04 DIAGNOSIS — Z00.00 MEDICARE ANNUAL WELLNESS VISIT, SUBSEQUENT: Primary | ICD-10-CM

## 2022-03-04 DIAGNOSIS — F33.0 MILD EPISODE OF RECURRENT MAJOR DEPRESSIVE DISORDER (HCC): ICD-10-CM

## 2022-03-04 DIAGNOSIS — K59.1 FUNCTIONAL DIARRHEA: ICD-10-CM

## 2022-03-04 PROCEDURE — 99213 OFFICE O/P EST LOW 20 MIN: CPT | Performed by: FAMILY MEDICINE

## 2022-03-04 PROCEDURE — G8484 FLU IMMUNIZE NO ADMIN: HCPCS | Performed by: FAMILY MEDICINE

## 2022-03-04 PROCEDURE — G0439 PPPS, SUBSEQ VISIT: HCPCS | Performed by: FAMILY MEDICINE

## 2022-03-04 PROCEDURE — G8417 CALC BMI ABV UP PARAM F/U: HCPCS | Performed by: FAMILY MEDICINE

## 2022-03-04 PROCEDURE — 1123F ACP DISCUSS/DSCN MKR DOCD: CPT | Performed by: FAMILY MEDICINE

## 2022-03-04 PROCEDURE — 4040F PNEUMOC VAC/ADMIN/RCVD: CPT | Performed by: FAMILY MEDICINE

## 2022-03-04 PROCEDURE — 99212 OFFICE O/P EST SF 10 MIN: CPT

## 2022-03-04 PROCEDURE — 1090F PRES/ABSN URINE INCON ASSESS: CPT | Performed by: FAMILY MEDICINE

## 2022-03-04 PROCEDURE — 1036F TOBACCO NON-USER: CPT | Performed by: FAMILY MEDICINE

## 2022-03-04 PROCEDURE — G8399 PT W/DXA RESULTS DOCUMENT: HCPCS | Performed by: FAMILY MEDICINE

## 2022-03-04 PROCEDURE — G8427 DOCREV CUR MEDS BY ELIG CLIN: HCPCS | Performed by: FAMILY MEDICINE

## 2022-03-04 PROCEDURE — 3017F COLORECTAL CA SCREEN DOC REV: CPT | Performed by: FAMILY MEDICINE

## 2022-03-04 ASSESSMENT — ENCOUNTER SYMPTOMS
WHEEZING: 0
COUGH: 0
SHORTNESS OF BREATH: 0
CHEST TIGHTNESS: 0
DIARRHEA: 1

## 2022-03-04 ASSESSMENT — PATIENT HEALTH QUESTIONNAIRE - PHQ9
SUM OF ALL RESPONSES TO PHQ QUESTIONS 1-9: 0
2. FEELING DOWN, DEPRESSED OR HOPELESS: 0
SUM OF ALL RESPONSES TO PHQ9 QUESTIONS 1 & 2: 0
SUM OF ALL RESPONSES TO PHQ QUESTIONS 1-9: 0
1. LITTLE INTEREST OR PLEASURE IN DOING THINGS: 0
SUM OF ALL RESPONSES TO PHQ QUESTIONS 1-9: 0
SUM OF ALL RESPONSES TO PHQ QUESTIONS 1-9: 0

## 2022-03-04 ASSESSMENT — LIFESTYLE VARIABLES: HOW OFTEN DO YOU HAVE A DRINK CONTAINING ALCOHOL: NEVER

## 2022-03-04 NOTE — PROGRESS NOTES
Medicare Annual Wellness Visit    Beth Charles is here for Medicare AWV and 6 Month Follow-Up (diarrhea continues)    Assessment & Plan   Cristi Thacker was seen today for medicare awv and 6 month follow-up. Diagnoses and all orders for this visit:    Medicare annual wellness visit, subsequent    Mild episode of recurrent major depressive disorder ([facility] [address]    Atrial flutter, unspecified type ([facility] [address]    Functional diarrhea         Recommendations for Preventive Services Due: see orders and patient instructions/AVS.  Recommended screening schedule for the next 5-10 years is provided to the patient in written form: see Patient Instructions/AVS.     Return in 6 months (on 9/4/2022) for Depression follow up. Subjective   The following acute and/or chronic problems were also addressed today:  Depression, diarrhea    Patient's complete Health Risk Assessment and screening values have been reviewed and are found in Flowsheets. The following problems were reviewed today and where indicated follow up appointments were made and/or referrals ordered.     Positive Risk Factor Screenings with Interventions:    Fall Risk:  Do you feel unsteady or are you worried about falling? : (!) yes  2 or more falls in past year?: no  Fall with injury in past year?: no     Fall Risk Interventions:    · Home safety tips provided              General Health and ACP:  General  In general, how would you say your health is?: Good  In the past 7 days, have you experienced any of the following: New or Increased Pain, New or Increased Fatigue, Loneliness, Social Isolation, Stress or Anger?: (!) Yes  Select all that apply: (!) New or Increased Pain  Do you get the social and emotional support that you need?: Yes  Do you have a Living Will?: (!) No    Advance Directives     Power of  Living Will ACP-Advance Directive ACP-Power of     Not on File Not on File Not on File Not on File      General Health Risk Interventions:  · No Living Will: Patient declines ACP discussion/assistance    Health Habits/Nutrition:     Physical Activity: Inactive    Days of Exercise per Week: 0 days    Minutes of Exercise per Session: 0 min     Have you lost any weight without trying in the past 3 months?: No  Body mass index: (!) 32.08  Have you seen the dentist within the past year?: Yes    Health Habits/Nutrition Interventions:  · Inadequate physical activity:  patient agrees to increase physical activity as follows: she is going to start walking more    Hearing/Vision:  Do you or your family notice any trouble with your hearing that hasn't been managed with hearing aids?: (!) Yes  Do you have difficulty driving, watching TV, or doing any of your daily activities because of your eyesight?: (!) Yes  Have you had an eye exam within the past year?: Yes  No exam data present    Hearing/Vision Interventions:  · Hearing concerns:  patient declines any further evaluation/treatment for hearing issues    Safety:  Do you have working smoke detectors?: Yes  Do you have any tripping hazards - loose or unsecured carpets or rugs?: (!) Yes  Do you have any tripping hazards - clutter in doorways, halls, or stairs?: (!) Yes  Do you have either shower bars, grab bars, non-slip mats or non-slip surfaces in your shower or bathtub?: Yes  Do all of your stairways have a railing or banister?: Yes  Do you always fasten your seatbelt when you are in a car?: Yes    Safety Interventions:  · Home safety tips provided           Objective   Vitals:    03/04/22 0942   BP: 122/70   Pulse: 63   Temp: 97.9 °F (36.6 °C)   SpO2: 94%   Weight: 211 lb (95.7 kg)   Height: 5' 8\" (1.727 m)      Body mass index is 32.08 kg/m². Allergies   Allergen Reactions    Celebrex [Celecoxib] Rash     Whole body    Codeine Other (See Comments)     Gives her a headache     Prior to Visit Medications    Medication Sig Taking?  Authorizing Provider   hydrOXYzine (ATARAX) 25 MG tablet TAKE 1 TABLET BY MOUTH NIGHTLY AS NEEDED (INTERSTITIAL CYSTITIS) Yes Laci Gregory MD   triamcinolone (KENALOG) 0.025 % cream Apply Topically daily as needed for itching Yes Laci Gregory MD   NONFORMULARY Take 1 capsule by mouth daily Cholesterol 360  Yes Historical Provider, MD   Multiple Vitamins-Minerals (PRESERVISION AREDS 2) CHEW Take 1 each by mouth 2 times daily  Yes Historical Provider, MD   Ascorbic Acid (VITAMIN C PO) Take 1 tablet by mouth daily  Yes Historical Provider, MD   Cholecalciferol (VITAMIN D3 PO) Take 1 tablet by mouth daily  Yes Historical Provider, MD   Nebulizers (COMPRESSOR/NEBULIZER) MISC Use as directed Yes ROSE MARIE Pathak   pseudoephedrine (SUDAFED 12 HR) 120 MG extended release tablet Take 120 mg by mouth every 12 hours  Yes Historical Provider, MD   fluticasone (FLONASE) 50 MCG/ACT nasal spray by Nasal route  Yes Historical Provider, MD   ibuprofen (ADVIL;MOTRIN) 800 MG tablet Take 1 tablet by mouth every 8 hours as needed for Pain Yes Rishabh Overton MD   albuterol (PROVENTIL) (2.5 MG/3ML) 0.083% nebulizer solution Take 3 mLs by nebulization every 4 hours as needed for Wheezing or Shortness of Breath  Patient not taking: Reported on 6/16/2021  ROSE MARIE Pathak       CareTeam (Including outside providers/suppliers regularly involved in providing care):   Patient Care Team:  Laci Gregory MD as PCP - Darya Butler MD as PCP - [facility] Empaneled Provider    Reviewed and updated this visit:  Tobacco  Allergies  Meds  Problems  Med Hx  Surg Hx  Soc Hx  Fam Hx

## 2022-03-04 NOTE — PROGRESS NOTES
LAURA Munson [address]  [address] [address]  Dept: [phone]  Dept Fax: [phone]  Loc: [phone]    Albert Palma is a 66 y.o. female who presents today for her medical conditions/complaints as noted below. Albert Palma is c/o of   Chief Complaint   Patient presents with    Medicare AWV    6 Month Follow-Up     diarrhea continues       HPI:     HPI Here today for her [address] and a follow up of her diarrhea. She has recently had a cold. She has been coughing, having body aches and she is feeling a better. She is not sure if she had a fever. Diarrhea: stable; she continues to have regular diarrhea. She suspects that she could have some lactose intolerance because dairy and cheese seem to make it worse. The [facility] helped but it was too expensive. No blood in the stool. I suggested fiber and she has not tried that in the past so she is going to try it. Her neck pain has been stable she sees the chiropractor every 2 weeks. The injections did not help. She has not had any issues with depression recently. Her son is home from [place] so she knows that he is safe. She has not seen him in 5 years. She has been getting some neuropathy that makes her feet feel spongy and less stable and she gets tingling at night in bed. Occasionally the neuropathy wakes her up. She doesn't want to do anything about it right now.      Past Medical History:   Diagnosis Date    Allergic rhinitis     Anxiety     Asthma     Bronchitis     Chest pain     Chronic back pain     Depression     Fibromyalgia     GERD (gastroesophageal reflux disease)     Headache(784.0)     Heart palpitations     Irregular bowel habits     Macular degeneration     beginning    Measles     Osteoarthritis     Restless legs syndrome     Tachycardia     Unspecified sleep apnea     Wet senile macular degeneration (HCC) bilateral- Sees a retinal Dr Carney Carbon History     Tobacco Use    Smoking status: Former Smoker     Packs/day: 1.00     Years: 22.00     Pack years: 22.00     Types: Cigarettes     Start date: 1975     Quit date: 1993     Years since quittin.3    Smokeless tobacco: Never Used   Substance Use Topics    Alcohol use: Yes     Alcohol/week: 2.0 standard drinks     Types: 2 Glasses of wine per week     Current Outpatient Medications   Medication Sig Dispense Refill    hydrOXYzine (ATARAX) 25 MG tablet TAKE 1 TABLET BY MOUTH NIGHTLY AS NEEDED (INTERSTITIAL CYSTITIS) 90 tablet 0    triamcinolone (KENALOG) 0.025 % cream Apply Topically daily as needed for itching 80 g 1    NONFORMULARY Take 1 capsule by mouth daily Cholesterol 360       Multiple Vitamins-Minerals (PRESERVISION AREDS 2) CHEW Take 1 each by mouth 2 times daily       Ascorbic Acid (VITAMIN C PO) Take 1 tablet by mouth daily       Cholecalciferol (VITAMIN D3 PO) Take 1 tablet by mouth daily       Nebulizers (COMPRESSOR/NEBULIZER) MISC Use as directed 1 each 3    pseudoephedrine (SUDAFED 12 HR) 120 MG extended release tablet Take 120 mg by mouth every 12 hours       fluticasone (FLONASE) 50 MCG/ACT nasal spray by Nasal route       ibuprofen (ADVIL;MOTRIN) 800 MG tablet Take 1 tablet by mouth every 8 hours as needed for Pain 30 tablet 0    albuterol (PROVENTIL) (2.5 MG/3ML) 0.083% nebulizer solution Take 3 mLs by nebulization every 4 hours as needed for Wheezing or Shortness of Breath (Patient not taking: Reported on 2021) 25 each 3     No current facility-administered medications for this visit. Allergies   Allergen Reactions    Celebrex [Celecoxib] Rash     Whole body    Codeine Other (See Comments)     Gives her a headache       Subjective:     Review of Systems   Constitutional: Negative for activity change, appetite change, chills, fatigue and fever. Eyes: Negative for visual disturbance. Respiratory: Negative for cough, chest tightness, shortness of breath and wheezing. Cardiovascular: Negative for chest pain, palpitations and leg swelling. Gastrointestinal: Positive for diarrhea. Genitourinary: Negative for difficulty urinating. Musculoskeletal: Positive for neck pain and neck stiffness. Neurological: Negative for dizziness, syncope, weakness, light-headedness and headaches. Psychiatric/Behavioral: Negative for decreased concentration, dysphoric mood and sleep disturbance. The patient is not nervous/anxious. Objective:      Physical Exam  Vitals and nursing note reviewed. Constitutional:       General: She is not in acute distress. Appearance: She is well-developed. HENT:      Right Ear: Tympanic membrane, ear canal and external ear normal.      Left Ear: Tympanic membrane, ear canal and external ear normal.      Nose: Mucosal edema present. Right Sinus: No maxillary sinus tenderness or frontal sinus tenderness. Left Sinus: No maxillary sinus tenderness or frontal sinus tenderness. Mouth/Throat:      Pharynx: No oropharyngeal exudate. Eyes:      Conjunctiva/sclera: Conjunctivae normal.   Cardiovascular:      Rate and Rhythm: Normal rate and regular rhythm. Heart sounds: Normal heart sounds. No murmur heard. Pulmonary:      Effort: Pulmonary effort is normal. No respiratory distress. Breath sounds: Normal breath sounds. No wheezing or rales. Musculoskeletal:      Cervical back: Normal range of motion and neck supple. Lymphadenopathy:      Cervical: No cervical adenopathy. Skin:     General: Skin is warm and dry. Findings: No rash. Neurological:      Mental Status: She is alert and oriented to person, place, and time.    Psychiatric:         Behavior: Behavior normal.         Judgment: Judgment normal.       /70   Pulse 63   Temp 97.9 °F (36.6 °C)   Ht 5' 8\" (1.727 m)   Wt 211 lb (95.7 kg)   SpO2 94%   BMI 32.08 kg/m²     Assessment:       Diagnosis Orders   1. Medicare annual wellness visit, subsequent     2. Mild episode of recurrent major depressive disorder ([facility] [address]     3. Atrial flutter, unspecified type ([facility] [address]     4. Functional diarrhea               Plan:        MWV: see other note    Depression: she has been doing pretty well. She is very distraught about her sister's recent demetia diagnosis, but otherwise she is doing well. Atrial flutter: stable; she follows with cardiology    Functional diarrhea: stable; she has not had any significant improvement and the June Schillings is too expensive so I recommended fiber supplements. Return in 6 months (on 9/4/2022) for Depression follow up. Patientgiven educational materials - see patient instructions. Discussed use, benefit,and side effects of prescribed medications. All patient questions answered. Ptvoiced understanding. Reviewed health maintenance. Instructed to continue currentmedications, diet and exercise. Patient agreed with treatment plan. Follow up asdirected.      Electronically signed by Nikolas Carrillo MD on 3/4/2022 at 1:18 PM

## 2022-03-04 NOTE — PATIENT INSTRUCTIONS
Personalized Preventive Plan for Matthew King - 3/4/2022  Medicare offers a range of preventive health benefits. Some of the tests and screenings are paid in full while other may be subject to a deductible, co-insurance, and/or copay. Some of these benefits include a comprehensive review of your medical history including lifestyle, illnesses that may run in your family, and various assessments and screenings as appropriate. After reviewing your medical record and screening and assessments performed today your provider may have ordered immunizations, labs, imaging, and/or referrals for you. A list of these orders (if applicable) as well as your Preventive Care list are included within your After Visit Summary for your review. Other Preventive Recommendations:    · A preventive eye exam performed by an eye specialist is recommended every 1-2 years to screen for glaucoma; cataracts, macular degeneration, and other eye disorders. · A preventive dental visit is recommended every 6 months. · Try to get at least 150 minutes of exercise per week or 10,000 steps per day on a pedometer . · Order or download the FREE \"Exercise & Physical Activity: Your Everyday Guide\" from [employer]. Call 1-561.683.2270 or search [employer] online. · You need 7570-1879 mg of calcium and 2269-5077 IU of vitamin D per day. It is possible to meet your calcium requirement with diet alone, but a vitamin D supplement is usually necessary to meet this goal.  · When exposed to the sun, use a sunscreen that protects against both UVA and UVB radiation with an SPF of 30 or greater. Reapply every 2 to 3 hours or after sweating, drying off with a towel, or swimming. · Always wear a seat belt when traveling in a car. Always wear a helmet when riding a bicycle or motorcycle.

## 2022-03-08 ENCOUNTER — TELEPHONE (OUTPATIENT)
Dept: OBGYN | Age: 72
End: 2022-03-08

## 2022-03-08 NOTE — LETTER
[address] OB GYN A department of [place] [address]  Phone: [phone]  Fax: 1 Devendra Bañuelos DO        March 8, 2022     Cody Gathers  [address] [place] [address]      Dear Joelle Krabbe: This letter is a reminder that your DEXA Scan test is due. Please call our office to schedule an appointment. If you've already underwent or scheduled this procedure, please disregard this notice.     Sincerely,        Margareth Casarez DO
 used

## 2022-05-18 NOTE — TELEPHONE ENCOUNTER
palpitations     Irregular bowel habits     Macular degeneration     beginning    Measles     Osteoarthritis     Restless legs syndrome     Tachycardia     Unspecified sleep apnea     Wet senile macular degeneration (HCC)     bilateral- Sees a retinal Dr Adealida Kwok     Past Surgical History:   Procedure Laterality Date    COLONOSCOPY      DILATION AND CURETTAGE OF UTERUS N/A 2019    DILATION AND CURETTAGE OF UTERUS N/A 3/12/2019    Dilatation & Curettage Hysteroscopy w/ polypectomy performed by Earl Ceja MD at [address] [place] [address]      cataracts removed right eye    KNEE ARTHROSCOPY      left    KNEE SURGERY      total replacement-right    KNEE SURGERY Left 2014    SIGMOIDOSCOPY      SINUS SURGERY      TONSILLECTOMY      TUBAL LIGATION       Social History     Tobacco Use    Smoking status: Former Smoker     Packs/day: 1.00     Years: 22.00     Pack years: 22.00     Types: Cigarettes     Start date: 1975     Quit date: 1993     Years since quittin.7    Smokeless tobacco: Never Used   Vaping Use    Vaping Use: Never used   Substance Use Topics    Alcohol use:  Yes     Alcohol/week: 2.0 standard drinks     Types: 2 Glasses of wine per week    Drug use: No     Medications:  Current Outpatient Medications   Medication Sig Dispense Refill    FLUoxetine (PROZAC) 40 MG capsule take 1 capsule by mouth once daily 90 capsule 1    montelukast (SINGULAIR) 10 MG tablet take 1 tablet by mouth every evening 90 tablet 1    pantoprazole (PROTONIX) 40 MG tablet take 1 tablet by mouth once daily 30 tablet 5    cholestyramine (QUESTRAN) 4 g packet take 1 packet ( 4 grams ) by mouth once daily 30 packet 3    propranolol (INDERAL LA) 60 MG extended release capsule take 1 capsule by mouth once daily 30 capsule 5    hydrOXYzine (ATARAX) 25 MG tablet Take 1 tablet by mouth nightly as needed (intersitial cystitis) 90 tablet 1    buPROPion (WELLBUTRIN XL) 150 MG extended release tablet Take 1 tablet by mouth every morning 90 tablet 3    triamcinolone (KENALOG) 0.025 % cream Apply Topically daily as needed for itching 80 g 1    NONFORMULARY Take 1 capsule by mouth daily Cholesterol 360      Multiple Vitamins-Minerals (PRESERVISION AREDS 2) CHEW Take 1 each by mouth 2 times daily      VITAMIN E PO Take 1 capsule by mouth daily       Ascorbic Acid (VITAMIN C PO) Take 1 tablet by mouth daily      Cholecalciferol (VITAMIN D3 PO) Take 1 tablet by mouth daily      BIOTIN PO Take 1 tablet by mouth daily      albuterol (PROVENTIL) (2.5 MG/3ML) 0.083% nebulizer solution Take 3 mLs by nebulization every 4 hours as needed for Wheezing or Shortness of Breath (Patient not taking: Reported on 6/16/2021) 25 each 3    Nebulizers (COMPRESSOR/NEBULIZER) MISC Use as directed 1 each 3    CALCIUM-MAGNESIUM-VITAMIN D PO Take by mouth      pseudoephedrine (SUDAFED 12 HR) 120 MG extended release tablet Take 120 mg by mouth every 12 hours       fluticasone (FLONASE) 50 MCG/ACT nasal spray by Nasal route       ibuprofen (ADVIL;MOTRIN) 800 MG tablet Take 1 tablet by mouth every 8 hours as needed for Pain 30 tablet 0     No current facility-administered medications for this visit. Scheduled Meds:  Continuous Infusions:  PRN Meds:. Prior to Admission medications    Medication Sig Start Date End Date Taking?  Authorizing Provider   FLUoxetine (PROZAC) 40 MG capsule take 1 capsule by mouth once daily 7/26/21   Padmini Persaud MD   montelukast (SINGULAIR) 10 MG tablet take 1 tablet by mouth every evening 7/8/21   Padmini Persaud MD   pantoprazole (PROTONIX) 40 MG tablet take 1 tablet by mouth once daily 6/14/21   Padmini Persaud MD   cholestyramine Yuniel Montane) 4 g packet take 1 packet ( 4 grams ) by mouth once daily 5/10/21   Padmini Persaud MD   propranolol (INDERAL LA) 60 MG extended release capsule take 1 capsule by mouth once daily 3/8/21   Padmini Persaud MD   hydrOXYzine (ATARAX) 25 MG tablet Take 1 tablet by mouth nightly as needed (intersitial cystitis) 2/8/21   Taco Ferrara MD   buPROPion (WELLBUTRIN XL) 150 MG extended release tablet Take 1 tablet by mouth every morning 10/22/20   Taco Ferrara MD   triamcinolone (KENALOG) 0.025 % cream Apply Topically daily as needed for itching 9/15/20   Taco Ferrara MD   NONFORMULARY Take 1 capsule by mouth daily Cholesterol 360    Historical Provider, MD   Multiple Vitamins-Minerals (PRESERVISION AREDS 2) CHEW Take 1 each by mouth 2 times daily    Historical Provider, MD   VITAMIN E PO Take 1 capsule by mouth daily     Historical Provider, MD   Ascorbic Acid (VITAMIN C PO) Take 1 tablet by mouth daily    Historical Provider, MD   Cholecalciferol (VITAMIN D3 PO) Take 1 tablet by mouth daily    Historical Provider, MD   BIOTIN PO Take 1 tablet by mouth daily    Historical Provider, MD   albuterol (PROVENTIL) (2.5 MG/3ML) 0.083% nebulizer solution Take 3 mLs by nebulization every 4 hours as needed for Wheezing or Shortness of Breath  Patient not taking: Reported on 6/16/2021 1/12/20   ROSE MARIE Castillo   Nebulizers (COMPRESSOR/NEBULIZER) MISC Use as directed 1/12/20   ROSE MARIE Castillo   CALCIUM-MAGNESIUM-VITAMIN D PO Take by mouth    Historical Provider, MD   pseudoephedrine (SUDAFED 12 HR) 120 MG extended release tablet Take 120 mg by mouth every 12 hours     Historical Provider, MD   fluticasone (FLONASE) 50 MCG/ACT nasal spray by Nasal route     Historical Provider, MD   ibuprofen (ADVIL;MOTRIN) 800 MG tablet Take 1 tablet by mouth every 8 hours as needed for Pain 7/31/16   Yelena Garcia MD     Vital Signs (Current) [unfilled]    Weight:   Wt Readings from Last 1 Encounters:   07/28/21 224 lb 4 oz (101.7 kg)     Height:   Ht Readings from Last 1 Encounters:   07/28/21 5' 8\" (1.727 m)      BMI:  There is no height or weight on file to calculate BMI.   Estimated body mass index is 34.1 kg/m² as calculated from the following:    Height as of 7/28/21: 5' 8\" (1.727 m). Weight as of 7/28/21: 224 lb 4 oz (101.7 kg). body mass index is unknown because there is no height or weight on file. Cardiac Clearance: None   Medical Clearance:None   Appointment for surgery Clearance scheduled for:None     Preoperative Testing: These are the current and completed labs:  CBC:   Lab Results   Component Value Date    WBC 10.3 06/16/2021    RBC 4.57 06/16/2021    RBC 4.54 03/17/2012    HGB 13.8 06/16/2021    HCT 44.6 06/16/2021    MCV 97.6 06/16/2021    RDW 13.7 06/16/2021     06/16/2021     CMP:   Lab Results   Component Value Date     05/14/2019    K 4.9 05/14/2019     05/14/2019    CO2 30 05/14/2019    BUN 19 05/14/2019    CREATININE 0.65 05/14/2019    GFRAA >60 05/14/2019    LABGLOM >60 05/14/2019    LABGLOM 73 01/04/2012    GLUCOSE 108 05/14/2019    GLUCOSE 105 01/04/2012    PROT 7.1 05/31/2018    CALCIUM 9.6 05/14/2019    BILITOT 0.46 05/31/2018    ALKPHOS 72 05/31/2018    AST 21 05/31/2018    ALT 20 05/31/2018     POC Tests: No results for input(s): POCGLU, POCNA, POCK, POCCL, POCBUN, POCHEMO, POCHCT in the last 72 hours.   Coags  No results found for: PROTIME, INR, APTT  HCG (If Applicable) No results found for: PREGTESTUR, PREGSERUM, HCG, HCGQUANT   ABGs No results found for: PHART, PO2ART, KWO8BIH, ZVQ5IUR, BEART, H0LYWFML   Type & Screen (If Applicable)  No results found for: Neela Waller    Additional ordered pre-operative testing:  [x]CBC    []ABG      [x] BMP   [x]URINALYSIS   []CMP    []HCG   []COAGS PT/INR  []T&C  []LFTs   [x]TYPE AND SCREEN    [] EKG  [] Chest X-Ray  [] Other Radiology    [] Sent to Hospitalist None  [] Sent to Anesthesia for your review: yes   [] Additional Orders: None     Comments:None   Requests: No Special requests    Signed: Keegan Mcleod LPN 0/7/0573 4:88 AM op Dapsone Counseling: I discussed with the patient the risks of dapsone including but not limited to hemolytic anemia, agranulocytosis, rashes, methemoglobinemia, kidney failure, peripheral neuropathy, headaches, GI upset, and liver toxicity.  Patients who start dapsone require monitoring including baseline LFTs and weekly CBCs for the first month, then every month thereafter.  The patient verbalized understanding of the proper use and possible adverse effects of dapsone.  All of the patient's questions and concerns were addressed.

## 2022-06-15 ENCOUNTER — PATIENT MESSAGE (OUTPATIENT)
Dept: FAMILY MEDICINE CLINIC | Age: 72
End: 2022-06-15

## 2022-06-16 NOTE — TELEPHONE ENCOUNTER
From: Zaheer Burgess  To: Dr. Pinzon Markin/15/2022 10:28 AM EDT  Subject: Re: Concerns needed address on virtual appointment    Dealing With anxiety and depression again. Having trouble with verbalizing everything that's going on with me without being able to stop and really think about it. My words fail Me Too! Often. After I have time to think about it I can usually come up with what I need. That's why sending this message to you is easier for me right now. My sister who is [de-identified] has dementia and I'm so scared that I have the beginning of it. Should I go  Back on Depression meds or take Atarax daily or is all my frustration a result of all my health issues. ie: eyes, foot/ankle, (in a boot for same issue that i saw you for yr + ago going to [facility]) fibro, asthma & heart        They said I had covid when I was in the emergency room. I didn't feel bad. But thinking possibly a couple of weeks before that is when I had it. I had a couple of days of a lot of pain I thought it was fibro. . And runny nose. Some coughing because of the runny nose. I'm having a lot of itching all over. I'm sure I have eczema on my face. Have yeast infection. Using monastat. Toenail fungus wondering if my body is filled with the fungus. Is that what causes eczema?
Will discuss at visit tomorrow
73 yo F with hx of RA, DM presents v woke up this morning, feeling dizzy for a few minutes with several episodes of loose nb stools and feeling nauseated.  Pt now feels fatigued and thinks she is dehydrated with nausea. Denies headache, CP, SOB, visual changes, fever, abd pain, vomiting, focal neuro changes, speech changes. ataxia, all other ROS negative. Pt AAO, NAD, RRR, CTA b/l, abd: soft/NT. Walking around ED with cane (baseline and sec to RA). Labs/ studies noted. Pt given IVF and Zofran and feeling better. TO f/up outpt. Return precautions given.

## 2022-06-17 ENCOUNTER — TELEMEDICINE (OUTPATIENT)
Dept: FAMILY MEDICINE CLINIC | Age: 72
End: 2022-06-17
Payer: MEDICARE

## 2022-06-17 DIAGNOSIS — R05.9 COUGH: ICD-10-CM

## 2022-06-17 DIAGNOSIS — L29.9 ITCHING: ICD-10-CM

## 2022-06-17 DIAGNOSIS — F33.1 MODERATE EPISODE OF RECURRENT MAJOR DEPRESSIVE DISORDER (HCC): Primary | ICD-10-CM

## 2022-06-17 DIAGNOSIS — Z12.31 ENCOUNTER FOR SCREENING MAMMOGRAM FOR MALIGNANT NEOPLASM OF BREAST: ICD-10-CM

## 2022-06-17 PROCEDURE — 3017F COLORECTAL CA SCREEN DOC REV: CPT | Performed by: FAMILY MEDICINE

## 2022-06-17 PROCEDURE — 1123F ACP DISCUSS/DSCN MKR DOCD: CPT | Performed by: FAMILY MEDICINE

## 2022-06-17 PROCEDURE — 1090F PRES/ABSN URINE INCON ASSESS: CPT | Performed by: FAMILY MEDICINE

## 2022-06-17 PROCEDURE — 99214 OFFICE O/P EST MOD 30 MIN: CPT | Performed by: FAMILY MEDICINE

## 2022-06-17 PROCEDURE — G8399 PT W/DXA RESULTS DOCUMENT: HCPCS | Performed by: FAMILY MEDICINE

## 2022-06-17 PROCEDURE — G8427 DOCREV CUR MEDS BY ELIG CLIN: HCPCS | Performed by: FAMILY MEDICINE

## 2022-06-17 RX ORDER — APIXABAN 5 MG/1
TABLET, FILM COATED ORAL
COMMUNITY
Start: 2022-06-05

## 2022-06-17 RX ORDER — ALBUTEROL SULFATE 2.5 MG/3ML
2.5 SOLUTION RESPIRATORY (INHALATION) EVERY 4 HOURS PRN
Qty: 125 ML | Refills: 1 | Status: SHIPPED | OUTPATIENT
Start: 2022-06-17

## 2022-06-17 RX ORDER — FLUOXETINE HYDROCHLORIDE 20 MG/1
20 CAPSULE ORAL DAILY
Qty: 14 CAPSULE | Refills: 0 | Status: SHIPPED | OUTPATIENT
Start: 2022-06-17 | End: 2022-09-06

## 2022-06-17 RX ORDER — DILTIAZEM HYDROCHLORIDE 120 MG/1
CAPSULE, COATED, EXTENDED RELEASE ORAL
COMMUNITY
Start: 2022-06-05

## 2022-06-17 RX ORDER — MAGNESIUM OXIDE 400 MG/1
400 TABLET ORAL DAILY
COMMUNITY

## 2022-06-17 RX ORDER — FLECAINIDE ACETATE 100 MG/1
100 TABLET ORAL EVERY 12 HOURS
COMMUNITY
Start: 2022-06-06 | End: 2022-07-06

## 2022-06-17 SDOH — ECONOMIC STABILITY: FOOD INSECURITY: WITHIN THE PAST 12 MONTHS, YOU WORRIED THAT YOUR FOOD WOULD RUN OUT BEFORE YOU GOT MONEY TO BUY MORE.: PATIENT DECLINED

## 2022-06-17 SDOH — ECONOMIC STABILITY: FOOD INSECURITY: WITHIN THE PAST 12 MONTHS, THE FOOD YOU BOUGHT JUST DIDN'T LAST AND YOU DIDN'T HAVE MONEY TO GET MORE.: PATIENT DECLINED

## 2022-06-17 ASSESSMENT — ENCOUNTER SYMPTOMS
CHEST TIGHTNESS: 0
COUGH: 0
SHORTNESS OF BREATH: 0
WHEEZING: 0

## 2022-06-17 ASSESSMENT — SOCIAL DETERMINANTS OF HEALTH (SDOH): HOW HARD IS IT FOR YOU TO PAY FOR THE VERY BASICS LIKE FOOD, HOUSING, MEDICAL CARE, AND HEATING?: PATIENT DECLINED

## 2022-06-17 NOTE — PROGRESS NOTES
2022    TELEHEALTH EVALUATION -- Audio/Visual (During TTEBZ-91 public health emergency)    HPI:    Chief Complaint   Patient presents with    ED Follow-up      Afib    Anxiety     Depression, to restart    Eczema     all over       Radhames Sweet (:  1950) has requested an audio/video evaluation for the following concern(s): Anxiety/depression: She has been slowly getting worse. She has had a few panic attacks. This happens when she can't come up with her words. She is not sleeping well. She is waking up frequently and having trouble falling asleep. Her  Headaches stopped in  after she stopped some medications but she is having them back again since her heart meds were restarted. She is having depression, she is crying more and she has been told she is crabby now. She stopped her psych meds in  due to headaches and diarrhea. Her heart has been better since being on the medication. She is not getting much exercise since her foot is in a boot. She is having ankle pain. She has not had any surgery. Eczema: she is very itchy and it gets it on her face, arms chest, back, legs. She is not having an rash. She is just itchy. No new exposures. No new pets. No new soap. She does not regularly take an allergy medication. Review of Systems   Constitutional: Negative for activity change, appetite change, chills, fatigue and fever. Eyes: Negative for visual disturbance. Respiratory: Negative for cough, chest tightness, shortness of breath and wheezing. Cardiovascular: Negative for chest pain, palpitations and leg swelling. Genitourinary: Negative for difficulty urinating. Neurological: Positive for headaches. Negative for dizziness, syncope, weakness and light-headedness. Psychiatric/Behavioral: Positive for dysphoric mood and sleep disturbance. Negative for decreased concentration. The patient is nervous/anxious. Prior to Visit Medications    Medication Sig Taking? Authorizing Provider   ELIQUIS 5 MG TABS tablet take 1 tablet by mouth twice a day Yes Historical Provider, MD   dilTIAZem (CARDIZEM CD) 120 MG extended release capsule take 1 capsule by mouth once daily Yes Historical Provider, MD   flecainide (TAMBOCOR) 100 MG tablet Take 100 mg by mouth every 12 hours Yes Jc Rose   magnesium oxide (MAG-OX) 400 MG tablet Take 400 mg by mouth daily Yes Historical Provider, MD   albuterol (PROVENTIL) (2.5 MG/3ML) 0.083% nebulizer solution Take 3 mLs by nebulization every 4 hours as needed for Wheezing or Shortness of Breath Yes Ok Carrasco MD   FLUoxetine (PROZAC) 20 MG capsule Take 1 capsule by mouth daily Yes Ok Carrasco MD   hydrOXYzine (ATARAX) 25 MG tablet TAKE 1 TABLET BY MOUTH NIGHTLY AS NEEDED (INTERSTITIAL CYSTITIS) Yes Ok Carrasco MD   triamcinolone (KENALOG) 0.025 % cream Apply Topically daily as needed for itching Yes Ok Carrasco MD   Multiple Vitamins-Minerals (PRESERVISION AREDS 2) CHEW Take 1 each by mouth 2 times daily  Yes Historical Provider, MD   Ascorbic Acid (VITAMIN C PO) Take 1 tablet by mouth daily  Yes Historical Provider, MD   Cholecalciferol (VITAMIN D3 PO) Take 2,000 Units by mouth daily  Yes Historical Provider, MD   Nebulizers (COMPRESSOR/NEBULIZER) MISC Use as directed Yes ROSE MARIE Del Angel       Social History     Tobacco Use    Smoking status: Former Smoker     Packs/day: 1.00     Years: 22.00     Pack years: 22.00     Types: Cigarettes     Start date: 1975     Quit date: 1993     Years since quittin.6    Smokeless tobacco: Never Used   Vaping Use    Vaping Use: Never used   Substance Use Topics    Alcohol use:  Yes     Alcohol/week: 2.0 standard drinks     Types: 2 Glasses of wine per week    Drug use: No        Allergies   Allergen Reactions    Celebrex [Celecoxib] Rash     Whole body    Codeine Other (See Comments)     Gives her a headache   ,   Past Medical History:   Diagnosis Date    Allergic rhinitis     Anxiety     Asthma     Bronchitis     Chest pain     Chronic back pain     Depression     Fibromyalgia     GERD (gastroesophageal reflux disease)     Headache(784.0)     Heart palpitations     Irregular bowel habits     Macular degeneration     beginning    Measles     Osteoarthritis     Restless legs syndrome     Tachycardia     Unspecified sleep apnea     Wet senile macular degeneration (HCC)     bilateral- Sees a retinal Dr Kassie Daly:  [ INSTRUCTIONS:  \"[x]\" Indicates a positive item  \"[]\" Indicates a negative item  -- DELETE ALL ITEMS NOT EXAMINED]  Vital Signs: (As obtained by patient/caregiver or practitioner observation)    Patient-Reported Vitals 6/17/2022   Patient-Reported Weight 215 lbs   Patient-Reported Height 5'8        Constitutional: [x] Appears well-developed and well-nourished [x] No apparent distress      [] Abnormal-   Mental status  [x] Alert and awake  [x] Oriented to person/place/time [x]Able to follow commands      Eyes:  EOM    [x]  Normal  [] Abnormal-  Sclera  [x]  Normal  [] Abnormal -         Discharge [x]  None visible  [] Abnormal -    HENT:   [x] Normocephalic, atraumatic.   [] Abnormal   [x] Mouth/Throat: Mucous membranes are moist.     External Ears [x] Normal  [] Abnormal-     Neck: [x] No visualized mass     Pulmonary/Chest: [x] Respiratory effort normal.  [x] No visualized signs of difficulty breathing or respiratory distress        [] Abnormal-      Musculoskeletal:   [x] Normal gait with no signs of ataxia         [x] Normal range of motion of neck        [] Abnormal-       Neurological:        [x] No Facial Asymmetry (Cranial nerve 7 motor function) (limited exam to video visit)          [] Abnormal-         Skin:        [x] No significant exanthematous lesions or discoloration noted on facial skin         [] Abnormal-            Psychiatric:       [x] Normal Affect [x] No Hallucinations        [] Abnormal- Other pertinent observable physical exam findings-     ASSESSMENT/PLAN:  1. Moderate episode of recurrent major depressive disorder (HCC)  Worsening; she is really struggling since being off of her medications. She didn't mind the prozac so I restarted that at a little lower dose (20mg) for 2 weeks then she will restart her 40mg caps. She will let me know if this doesn't work well for her. Her MMSE was completely normal so she was reassured that her memory issues are due to anxiety/depression not dementia. 2. Itching  New; she has been having more issues with itching specifically at night. I think this is mostly anxiety but in case it is allergy related I recommended she start a daily antihistamine and that she use the vistaril prn.     3. Encounter for screening mammogram for malignant neoplasm of breast  - [facility] JUDY DIGITAL SCREEN BILATERAL; Future    4. Cough  - albuterol (PROVENTIL) (2.5 MG/3ML) 0.083% nebulizer solution; Take 3 mLs by nebulization every 4 hours as needed for Wheezing or Shortness of Breath  Dispense: 125 mL; Refill: 1      Return if symptoms worsen or fail to improve, for keep appointment as scheduled. Anita Urbina, was evaluated through a synchronous (real-time) audio-video encounter. The patient (or guardian if applicable) is aware that this is a billable service, which includes applicable co-pays. This Virtual Visit was conducted with patient's (and/or legal guardian's) consent. The visit was conducted pursuant to the emergency declaration under the [facility], [address] [place] [address] authority and the [employer] and [employer] Act. Patient identification was verified, and a caregiver was present when appropriate. The patient was located at Home: [place] [address] [address] [place] [address]. Provider was located at [facility] ([address] [place], [place] [address]t): [address] [place] [address],  [address] Leatha Anderson.        Total time spent on this encounter: Not billed by time    --Renee Billings MD on 6/17/2022 at 1:13 PM    An electronic signature was used to authenticate this note.

## 2022-07-13 ENCOUNTER — TELEPHONE (OUTPATIENT)
Dept: FAMILY MEDICINE CLINIC | Age: 72
End: 2022-07-13

## 2022-07-13 DIAGNOSIS — Z12.83 SKIN EXAM, SCREENING FOR CANCER: Primary | ICD-10-CM

## 2022-07-13 NOTE — TELEPHONE ENCOUNTER
Spoke with pt and she would like to see a dermatologist in [facility].  Pt aware provider out of office until 7/19/22,  Please advise

## 2022-07-13 NOTE — TELEPHONE ENCOUNTER
Pt calling questioning if she could get a referral to Derm, states she has a spot on her nose that is getting worse, pt would possibly like to go to [facility] if her ins will cover, please advise at above number.

## 2022-07-14 NOTE — TELEPHONE ENCOUNTER
Pt calling back stating she's okay with seeing someone at [facility], please do referral.

## 2022-07-14 NOTE — TELEPHONE ENCOUNTER
Referral to [facility], Dr Shasha Gaytan placed. Saw message where pt wants [facility]. Pt to return call and specify where she wants to go.

## 2022-09-06 ENCOUNTER — TELEMEDICINE (OUTPATIENT)
Dept: FAMILY MEDICINE CLINIC | Age: 72
End: 2022-09-06
Payer: MEDICARE

## 2022-09-06 DIAGNOSIS — F33.0 MILD EPISODE OF RECURRENT MAJOR DEPRESSIVE DISORDER (HCC): Primary | ICD-10-CM

## 2022-09-06 PROCEDURE — 1036F TOBACCO NON-USER: CPT | Performed by: FAMILY MEDICINE

## 2022-09-06 PROCEDURE — 99212 OFFICE O/P EST SF 10 MIN: CPT

## 2022-09-06 PROCEDURE — 3017F COLORECTAL CA SCREEN DOC REV: CPT | Performed by: FAMILY MEDICINE

## 2022-09-06 PROCEDURE — G8427 DOCREV CUR MEDS BY ELIG CLIN: HCPCS | Performed by: FAMILY MEDICINE

## 2022-09-06 PROCEDURE — 1123F ACP DISCUSS/DSCN MKR DOCD: CPT | Performed by: FAMILY MEDICINE

## 2022-09-06 PROCEDURE — 99213 OFFICE O/P EST LOW 20 MIN: CPT | Performed by: FAMILY MEDICINE

## 2022-09-06 PROCEDURE — 1090F PRES/ABSN URINE INCON ASSESS: CPT | Performed by: FAMILY MEDICINE

## 2022-09-06 PROCEDURE — G8399 PT W/DXA RESULTS DOCUMENT: HCPCS | Performed by: FAMILY MEDICINE

## 2022-09-06 PROCEDURE — G8417 CALC BMI ABV UP PARAM F/U: HCPCS | Performed by: FAMILY MEDICINE

## 2022-09-06 RX ORDER — FAMOTIDINE 20 MG/1
20 TABLET, FILM COATED ORAL 2 TIMES DAILY
Qty: 60 TABLET | Refills: 5 | Status: SHIPPED | OUTPATIENT
Start: 2022-09-06

## 2022-09-06 RX ORDER — FLUOXETINE HYDROCHLORIDE 40 MG/1
40 CAPSULE ORAL DAILY
COMMUNITY
End: 2022-10-06 | Stop reason: SDUPTHER

## 2022-09-06 RX ORDER — FLECAINIDE ACETATE 100 MG/1
1 TABLET ORAL 2 TIMES DAILY
COMMUNITY
Start: 2022-09-04

## 2022-09-06 ASSESSMENT — ENCOUNTER SYMPTOMS
WHEEZING: 0
SHORTNESS OF BREATH: 0
ABDOMINAL PAIN: 0
DIARRHEA: 1
CHEST TIGHTNESS: 0
COUGH: 0

## 2022-09-06 NOTE — PROGRESS NOTES
concentration, dysphoric mood and sleep disturbance. The patient is not nervous/anxious. Prior to Visit Medications    Medication Sig Taking? Authorizing Provider   FLUoxetine (PROZAC) 40 MG capsule Take 40 mg by mouth daily Yes Historical Provider, MD   flecainide (TAMBOCOR) 100 MG tablet Take 1 tablet by mouth in the morning and at bedtime Yes Jc Rose   famotidine (PEPCID) 20 MG tablet Take 1 tablet by mouth 2 times daily Yes Frankey Delaware, MD   ELIQUIS 5 MG TABS tablet take 1 tablet by mouth twice a day Yes Historical Provider, MD   dilTIAZem (CARDIZEM CD) 120 MG extended release capsule take 1 capsule by mouth once daily Yes Historical Provider, MD   magnesium oxide (MAG-OX) 400 MG tablet Take 400 mg by mouth daily Yes Historical Provider, MD   albuterol (PROVENTIL) (2.5 MG/3ML) 0.083% nebulizer solution Take 3 mLs by nebulization every 4 hours as needed for Wheezing or Shortness of Breath Yes Frankey [place], MD   hydrOXYzine (ATARAX) 25 MG tablet TAKE 1 TABLET BY MOUTH NIGHTLY AS NEEDED (INTERSTITIAL CYSTITIS) Yes Frankey [place], MD   Multiple Vitamins-Minerals (PRESERVISION AREDS 2) CHEW Take 1 each by mouth 2 times daily  Yes Historical Provider, MD   Ascorbic Acid (VITAMIN C PO) Take 1 tablet by mouth daily  Yes Historical Provider, MD   Cholecalciferol (VITAMIN D3 PO) Take 2,000 Units by mouth daily  Yes Historical Provider, MD   Nebulizers (COMPRESSOR/NEBULIZER) MISC Use as directed Yes ROSE MARIE De Leon       Social History     Tobacco Use    Smoking status: Former     Packs/day: 1.00     Years: 22.00     Pack years: 22.00     Types: Cigarettes     Start date: 1975     Quit date: 1993     Years since quittin.8    Smokeless tobacco: Never   Vaping Use    Vaping Use: Never used   Substance Use Topics    Alcohol use:  Yes     Alcohol/week: 2.0 standard drinks     Types: 2 Glasses of wine per week    Drug use: No        Allergies   Allergen Reactions    Celebrex [Celecoxib] Rash     Whole body    Codeine Other (See Comments)     Gives her a headache   ,   Past Medical History:   Diagnosis Date    Allergic rhinitis     Anxiety     Asthma     Bronchitis     Chest pain     Chronic back pain     Depression     Fibromyalgia     GERD (gastroesophageal reflux disease)     Headache(784.0)     Heart palpitations     Irregular bowel habits     Macular degeneration     beginning    Measles     Osteoarthritis     Restless legs syndrome     Tachycardia     Unspecified sleep apnea     Wet senile macular degeneration (HCC)     bilateral- Sees a retinal Dr Jacinta Harry:  [ INSTRUCTIONS:  \"[x]\" Indicates a positive item  \"[]\" Indicates a negative item  -- DELETE ALL ITEMS NOT EXAMINED]  Vital Signs: (As obtained by patient/caregiver or practitioner observation)  Patient-Reported Vitals 9/6/2022   Patient-Reported Weight 220 lb   Patient-Reported Height 5'8.6\"          Constitutional: [x] Appears well-developed and well-nourished [x] No apparent distress      [] Abnormal-   Mental status  [x] Alert and awake  [x] Oriented to person/place/time []Able to follow commands      Eyes:  EOM    [x]  Normal  [] Abnormal-  Sclera  [x]  Normal  [] Abnormal -         Discharge [x]  None visible  [] Abnormal -    HENT:   [x] Normocephalic, atraumatic.   [] Abnormal   [x] Mouth/Throat: Mucous membranes are moist.     External Ears [x] Normal  [] Abnormal-     Neck: [] No visualized mass     Pulmonary/Chest: [x] Respiratory effort normal.  [x] No visualized signs of difficulty breathing or respiratory distress        [] Abnormal-      Musculoskeletal:   [] Normal gait with no signs of ataxia         [] Normal range of motion of neck        [] Abnormal-       Neurological:        [x] No Facial Asymmetry (Cranial nerve 7 motor function) (limited exam to video visit)          [] Abnormal-         Skin:        [x] No significant exanthematous lesions or discoloration noted on facial skin         [] Abnormal-            Psychiatric:       [x] Normal Affect [x] No Hallucinations        [] Abnormal-     Other pertinent observable physical exam findings-     ASSESSMENT/PLAN:  1. Mild episode of recurrent major depressive disorder ([address]  Improving; she is feeling much better. Since she is happy with the prozac dose I will continue it and follow up with her in 6 months. Return in about 6 months (around 3/6/2023) for [address] [place] [address], was evaluated through a synchronous (real-time) audio-video encounter. The patient (or guardian if applicable) is aware that this is a billable service, which includes applicable co-pays. This Virtual Visit was conducted with patient's (and/or legal guardian's) consent. The visit was conducted pursuant to the emergency declaration under the [address] [place] [address], [address] [place] [address] authority and the [employer] and [employer] General Act. Patient identification was verified, and a caregiver was present when appropriate. The patient was located at Home: [address]. Provider was located at [facility] ([address] [place] [address]): [address] [place], [place] [id number] [facility]. Total time spent on this encounter: Not billed by time    --Juan Mac MD on 9/6/2022 at 11:32 AM    An electronic signature was used to authenticate this note.

## 2022-09-11 ENCOUNTER — OFFICE VISIT (OUTPATIENT)
Dept: PRIMARY CARE CLINIC | Age: 72
End: 2022-09-11
Payer: MEDICARE

## 2022-09-11 VITALS
DIASTOLIC BLOOD PRESSURE: 80 MMHG | BODY MASS INDEX: 31.99 KG/M2 | WEIGHT: 216 LBS | HEIGHT: 69 IN | TEMPERATURE: 97.5 F | OXYGEN SATURATION: 97 % | SYSTOLIC BLOOD PRESSURE: 120 MMHG | HEART RATE: 64 BPM

## 2022-09-11 DIAGNOSIS — H57.11 PAIN OF RIGHT EYE: Primary | ICD-10-CM

## 2022-09-11 PROCEDURE — G8399 PT W/DXA RESULTS DOCUMENT: HCPCS | Performed by: FAMILY MEDICINE

## 2022-09-11 PROCEDURE — 99213 OFFICE O/P EST LOW 20 MIN: CPT | Performed by: FAMILY MEDICINE

## 2022-09-11 PROCEDURE — 1090F PRES/ABSN URINE INCON ASSESS: CPT | Performed by: FAMILY MEDICINE

## 2022-09-11 PROCEDURE — G8427 DOCREV CUR MEDS BY ELIG CLIN: HCPCS | Performed by: FAMILY MEDICINE

## 2022-09-11 PROCEDURE — 99212 OFFICE O/P EST SF 10 MIN: CPT | Performed by: FAMILY MEDICINE

## 2022-09-11 PROCEDURE — 1036F TOBACCO NON-USER: CPT | Performed by: FAMILY MEDICINE

## 2022-09-11 PROCEDURE — 1123F ACP DISCUSS/DSCN MKR DOCD: CPT | Performed by: FAMILY MEDICINE

## 2022-09-11 PROCEDURE — 3017F COLORECTAL CA SCREEN DOC REV: CPT | Performed by: FAMILY MEDICINE

## 2022-09-11 PROCEDURE — G8417 CALC BMI ABV UP PARAM F/U: HCPCS | Performed by: FAMILY MEDICINE

## 2022-09-11 ASSESSMENT — ENCOUNTER SYMPTOMS
RESPIRATORY NEGATIVE: 1
EYE ITCHING: 0
GASTROINTESTINAL NEGATIVE: 1
EYE REDNESS: 0
EYE DISCHARGE: 0
EYE PAIN: 1
PHOTOPHOBIA: 1

## 2022-09-11 NOTE — PROGRESS NOTES
Subjective:      Patient ID: Oscar Arana is a 67 y.o. female. HPI  acute urgent care visit for discomfort in the right eye. Scratchy sensation fairly mild. Some ache , sense of swelling. Not seeing a lot of drainage or mattering. She has a history of macular degeneration and is legally blind. Vision a little off. She needs a 10x magnifier for her phone and eye feels like its painful enough that she has trouble focusing. Some photophobia endorsed.       Past Medical History:   Diagnosis Date    Allergic rhinitis     Anxiety     Asthma     Bronchitis     Chest pain     Chronic back pain     Depression     Fibromyalgia     GERD (gastroesophageal reflux disease)     Headache(784.0)     Heart palpitations     Irregular bowel habits     Macular degeneration     beginning    Measles     Osteoarthritis     Restless legs syndrome     Tachycardia     Unspecified sleep apnea     Wet senile macular degeneration (HCC)     bilateral- Sees a retinal Dr Zelda Baker     Past Surgical History:   Procedure Laterality Date    COLONOSCOPY      DILATION AND CURETTAGE OF UTERUS N/A 03/12/2019    DILATION AND CURETTAGE OF UTERUS N/A 3/12/2019    Dilatation & Curettage Hysteroscopy w/ polypectomy performed by Joyce Rivas MD at [place] [address] 8/11/2021    D & C HYSTEROSCOPY WITH POLYPECTOMY performed by Zayra De Souza DO at [address] [place] [address]      cataracts removed right eye    KNEE ARTHROSCOPY      left    KNEE SURGERY      total replacement-right    KNEE SURGERY Left 11/2014    SIGMOIDOSCOPY      SINUS SURGERY      TONSILLECTOMY      TUBAL LIGATION       Current Outpatient Medications   Medication Sig Dispense Refill    Calcium Acetate, Phos Binder, (CALCIUM ACETATE PO) Take by mouth      Cyanocobalamin (VITAMIN B 12 PO) Take by mouth      FLUoxetine (PROZAC) 40 MG capsule Take 40 mg by mouth daily      flecainide (TAMBOCOR) 100 MG tablet Take 1 tablet by mouth in the morning and at bedtime      famotidine (PEPCID) 20 MG tablet Take 1 tablet by mouth 2 times daily 60 tablet 5    ELIQUIS 5 MG TABS tablet take 1 tablet by mouth twice a day      dilTIAZem (CARDIZEM CD) 120 MG extended release capsule take 1 capsule by mouth once daily      magnesium oxide (MAG-OX) 400 MG tablet Take 400 mg by mouth daily      albuterol (PROVENTIL) (2.5 MG/3ML) 0.083% nebulizer solution Take 3 mLs by nebulization every 4 hours as needed for Wheezing or Shortness of Breath 125 mL 1    hydrOXYzine (ATARAX) 25 MG tablet TAKE 1 TABLET BY MOUTH NIGHTLY AS NEEDED (INTERSTITIAL CYSTITIS) 90 tablet 0    Multiple Vitamins-Minerals (PRESERVISION AREDS 2) CHEW Take 1 each by mouth 2 times daily       Ascorbic Acid (VITAMIN C PO) Take 1 tablet by mouth daily       Cholecalciferol (VITAMIN D3 PO) Take 2,000 Units by mouth daily       Nebulizers (COMPRESSOR/NEBULIZER) MISC Use as directed 1 each 3     No current facility-administered medications for this visit. Allergies   Allergen Reactions    Celebrex [Celecoxib] Rash     Whole body    Codeine Other (See Comments)     Gives her a headache         Review of Systems   Constitutional: Negative. HENT: Negative. Eyes:  Positive for photophobia, pain and visual disturbance. Negative for discharge, redness and itching. Respiratory: Negative. Cardiovascular: Negative. Gastrointestinal: Negative. Genitourinary: Negative. Musculoskeletal: Negative. Skin: Negative. Neurological: Negative. Psychiatric/Behavioral: Negative. Objective:   Physical Exam  Constitutional:       General: She is not in acute distress. Appearance: She is well-developed. She is obese. HENT:      Head: Normocephalic and atraumatic. Right Ear: External ear normal.      Left Ear: External ear normal.      Nose: Nose normal.      Mouth/Throat:      Pharynx: No oropharyngeal exudate. Eyes:      General: Lids are normal. No scleral icterus.         Right eye: No foreign body, discharge or hordeolum. Left eye: No foreign body or discharge. Extraocular Movements: Extraocular movements intact. Right eye: Normal extraocular motion. Conjunctiva/sclera:      Right eye: Right conjunctiva is injected (mild, medial sclera. no mattering or exudate). No hemorrhage. Pupils:      Right eye: Pupil is round and reactive. No corneal abrasion. Funduscopic exam:     Right eye: No hemorrhage. Neck:      Thyroid: No thyromegaly. Cardiovascular:      Rate and Rhythm: Normal rate and regular rhythm. Heart sounds: Normal heart sounds. No murmur heard. Pulmonary:      Effort: Pulmonary effort is normal. No respiratory distress. Breath sounds: Normal breath sounds. No wheezing. Abdominal:      General: Bowel sounds are normal. There is no distension. Palpations: Abdomen is soft. Tenderness: There is no abdominal tenderness. There is no rebound. Musculoskeletal:         General: No tenderness. Normal range of motion. Cervical back: Neck supple. Skin:     General: Skin is warm and dry. Findings: No erythema or rash. Neurological:      Mental Status: She is alert and oriented to person, place, and time. Psychiatric:         Behavior: Behavior normal.         Thought Content: Thought content normal.         Judgment: Judgment normal.     /80   Pulse 64   Temp 97.5 °F (36.4 °C) (Tympanic)   Ht 5' 8.5\" (1.74 m)   Wt 216 lb (98 kg)   SpO2 97%   BMI 32.36 kg/m²     Assessment:      Encounter Diagnosis   Name Primary? Pain of right eye Yes     Background of macular degeneration, legally blind  Not seeing typical conjunctivitis or stye issues. Vision perceived as slightly worse      Plan:      Needing acute eye follow up with her ophthalmologist tomorrow morning for pressure check and slit lamp exam.     She will call her ophthalmologist in the morning for emergency visit.           Hayes Stauffer MD

## 2022-10-06 RX ORDER — FLUOXETINE HYDROCHLORIDE 40 MG/1
40 CAPSULE ORAL DAILY
Qty: 90 CAPSULE | Refills: 0 | Status: SHIPPED | OUTPATIENT
Start: 2022-10-06

## 2022-10-06 NOTE — TELEPHONE ENCOUNTER
Tami Dominguez called requesting a refill of the below medication which has been pended for you:     Requested Prescriptions     Pending Prescriptions Disp Refills    FLUoxetine (PROZAC) 40 MG capsule 90 capsule 0     Sig: Take 1 capsule by mouth daily       Last Appointment Date: 9/6/2022  Next Appointment Date: Visit date not found    Allergies   Allergen Reactions    Celebrex [Celecoxib] Rash     Whole body    Codeine Other (See Comments)     Gives her a headache

## 2022-12-27 RX ORDER — FLUOXETINE HYDROCHLORIDE 40 MG/1
CAPSULE ORAL
Qty: 90 CAPSULE | Refills: 0 | Status: SHIPPED | OUTPATIENT
Start: 2022-12-27

## 2022-12-27 NOTE — TELEPHONE ENCOUNTER
Kristi Denney called requesting a refill of the below medication which has been pended for you:     Requested Prescriptions     Pending Prescriptions Disp Refills    FLUoxetine (PROZAC) 40 MG capsule [Pharmacy Med Name: FLUOXETINE HCL 40 MG CAPSULE] 90 capsule 0     Sig: take 1 capsule by mouth once daily       Last Appointment Date: 9/6/2022  Next Appointment Date: Visit date not found    Allergies   Allergen Reactions    Celebrex [Celecoxib] Rash     Whole body    Codeine Other (See Comments)     Gives her a headache

## 2023-01-02 ENCOUNTER — OFFICE VISIT (OUTPATIENT)
Dept: PRIMARY CARE CLINIC | Age: 73
End: 2023-01-02
Payer: COMMERCIAL

## 2023-01-02 VITALS
WEIGHT: 222 LBS | BODY MASS INDEX: 33.65 KG/M2 | TEMPERATURE: 97.5 F | DIASTOLIC BLOOD PRESSURE: 74 MMHG | SYSTOLIC BLOOD PRESSURE: 126 MMHG | HEIGHT: 68 IN | OXYGEN SATURATION: 97 % | HEART RATE: 55 BPM

## 2023-01-02 DIAGNOSIS — J45.41 MODERATE PERSISTENT ASTHMA WITH EXACERBATION: ICD-10-CM

## 2023-01-02 DIAGNOSIS — R05.1 ACUTE COUGH: Primary | ICD-10-CM

## 2023-01-02 PROCEDURE — 1036F TOBACCO NON-USER: CPT | Performed by: NURSE PRACTITIONER

## 2023-01-02 PROCEDURE — 1123F ACP DISCUSS/DSCN MKR DOCD: CPT | Performed by: NURSE PRACTITIONER

## 2023-01-02 PROCEDURE — 99213 OFFICE O/P EST LOW 20 MIN: CPT | Performed by: NURSE PRACTITIONER

## 2023-01-02 PROCEDURE — G8417 CALC BMI ABV UP PARAM F/U: HCPCS | Performed by: NURSE PRACTITIONER

## 2023-01-02 PROCEDURE — 3017F COLORECTAL CA SCREEN DOC REV: CPT | Performed by: NURSE PRACTITIONER

## 2023-01-02 PROCEDURE — 1090F PRES/ABSN URINE INCON ASSESS: CPT | Performed by: NURSE PRACTITIONER

## 2023-01-02 PROCEDURE — G8484 FLU IMMUNIZE NO ADMIN: HCPCS | Performed by: NURSE PRACTITIONER

## 2023-01-02 PROCEDURE — G8399 PT W/DXA RESULTS DOCUMENT: HCPCS | Performed by: NURSE PRACTITIONER

## 2023-01-02 PROCEDURE — G8427 DOCREV CUR MEDS BY ELIG CLIN: HCPCS | Performed by: NURSE PRACTITIONER

## 2023-01-02 RX ORDER — BENZONATATE 100 MG/1
100 CAPSULE ORAL 3 TIMES DAILY PRN
Qty: 30 CAPSULE | Refills: 0 | Status: SHIPPED | OUTPATIENT
Start: 2023-01-02 | End: 2023-01-12

## 2023-01-02 RX ORDER — PREDNISONE 20 MG/1
20 TABLET ORAL 2 TIMES DAILY
Qty: 10 TABLET | Refills: 0 | Status: SHIPPED | OUTPATIENT
Start: 2023-01-02 | End: 2023-01-07

## 2023-01-02 ASSESSMENT — ENCOUNTER SYMPTOMS
SINUS PRESSURE: 1
RHINORRHEA: 0
COUGH: 1
CHEST TIGHTNESS: 0
SHORTNESS OF BREATH: 0
WHEEZING: 1
SINUS COMPLAINT: 1
SORE THROAT: 0

## 2023-01-02 NOTE — PROGRESS NOTES
[facility]             [address], [address]                        Telephone [phone]             Fax [phone]     Bernardo Collazo  1950  OGL:[id number]   Date of visit:  1/2/2023    Subjective:    Bernardo Collazo is a 67 y.o.  female who presents to [facility] today (1/2/2023) for evaluation of:    Chief Complaint   Patient presents with    Sinus Problem     Congestion, and cough. Onset: Tuesday  Is having surgery on 1/9/22. Sinus Problem  This is a new problem. The current episode started in the past 7 days (12/27/22). The problem has been gradually worsening since onset. There has been no fever. The pain is mild. Associated symptoms include congestion, coughing and sinus pressure. Pertinent negatives include no chills, headaches, shortness of breath or sore throat. (wheezing) Treatments tried: mucinex DM, albuterol nebulizer. The treatment provided mild relief.      She has the following problem list:  Patient Active Problem List   Diagnosis    Chronic cough    Chronic maxillary sinusitis    Chronic ethmoidal sinusitis    Chronic frontal sinusitis    Nasal vestibulitis    Epistaxis    Nasal septal deviation    Nasal turbinate hypertrophy    Chronic sphenoidal sinusitis    Sleep apnea    Fibromyalgia    GERD (gastroesophageal reflux disease)    Depression    Vitamin D deficiency    Intrinsic eczema    Postmenopausal bleeding    Intramural leiomyoma of uterus    Allergic rhinitis    Anxiety state    Atrial flutter (HCC)    Diverticulosis of colon    Hyperlipidemia    Myalgia and myositis    Arthritis of knee, degenerative    Osteoarthritis of left knee    Pancreatic cyst    Backache    Asthma    Aftercare following joint replacement surgery    Artificial knee joint present    Primary localized osteoarthrosis of ankle and foot    Mild episode of recurrent major depressive disorder ([facility] [address]    Chronic migraine without aura with status migrainosus, not intractable    History of D&C HSCOPE 8/11/2021        Current medications are:  Current Outpatient Medications   Medication Sig Dispense Refill    predniSONE (DELTASONE) 20 MG tablet Take 1 tablet by mouth 2 times daily for 5 days 10 tablet 0    benzonatate (TESSALON) 100 MG capsule Take 1 capsule by mouth 3 times daily as needed for Cough 30 capsule 0    FLUoxetine (PROZAC) 40 MG capsule take 1 capsule by mouth once daily 90 capsule 0    Calcium Acetate, Phos Binder, (CALCIUM ACETATE PO) Take by mouth      Cyanocobalamin (VITAMIN B 12 PO) Take by mouth      flecainide (TAMBOCOR) 100 MG tablet Take 1 tablet by mouth in the morning and at bedtime      famotidine (PEPCID) 20 MG tablet Take 1 tablet by mouth 2 times daily 60 tablet 5    ELIQUIS 5 MG TABS tablet take 1 tablet by mouth twice a day      dilTIAZem (CARDIZEM CD) 120 MG extended release capsule take 1 capsule by mouth once daily      magnesium oxide (MAG-OX) 400 MG tablet Take 400 mg by mouth daily      albuterol (PROVENTIL) (2.5 MG/3ML) 0.083% nebulizer solution Take 3 mLs by nebulization every 4 hours as needed for Wheezing or Shortness of Breath 125 mL 1    hydrOXYzine (ATARAX) 25 MG tablet TAKE 1 TABLET BY MOUTH NIGHTLY AS NEEDED (INTERSTITIAL CYSTITIS) 90 tablet 0    Multiple Vitamins-Minerals (PRESERVISION AREDS 2) CHEW Take 1 each by mouth 2 times daily       Ascorbic Acid (VITAMIN C PO) Take 1 tablet by mouth daily       Cholecalciferol (VITAMIN D3 PO) Take 2,000 Units by mouth daily       Nebulizers (COMPRESSOR/NEBULIZER) MISC Use as directed 1 each 3     No current facility-administered medications for this visit. She is allergic to celebrex [celecoxib] and codeine. .    She  reports that she quit smoking about 29 years ago. Her smoking use included cigarettes. She started smoking about 47 years ago. She has a 22.00 pack-year smoking history.  She has never used smokeless tobacco.      Objective:    Vitals:    01/02/23 0844   BP: 126/74   Site: Left Upper Arm   Position: Sitting   Pulse: 55   Temp: 97.5 °F (36.4 °C)   TempSrc: Tympanic   SpO2: 97%   Weight: 222 lb (100.7 kg)   Height: 5' 8\" (1.727 m)     Body mass index is 33.75 kg/m². Review of Systems   Constitutional: Negative. Negative for chills and fever. HENT:  Positive for congestion and sinus pressure. Negative for postnasal drip, rhinorrhea and sore throat. Respiratory:  Positive for cough and wheezing. Negative for chest tightness and shortness of breath. Cardiovascular: Negative. Neurological:  Negative for headaches. Physical Exam  Vitals and nursing note reviewed. Constitutional:       Appearance: Normal appearance. She is well-developed. HENT:      Head: Normocephalic. Jaw: There is normal jaw occlusion. Right Ear: Tympanic membrane, ear canal and external ear normal.      Left Ear: Tympanic membrane, ear canal and external ear normal.      Nose: Congestion present. Right Turbinates: Swollen. Left Turbinates: Swollen. Right Sinus: No maxillary sinus tenderness or frontal sinus tenderness. Left Sinus: No maxillary sinus tenderness or frontal sinus tenderness. Mouth/Throat:      Lips: Pink. Mouth: Mucous membranes are moist.      Pharynx: Oropharynx is clear. Uvula midline. Posterior oropharyngeal erythema (light) present. Tonsils: 0 on the right. 0 on the left. Eyes:      Pupils: Pupils are equal, round, and reactive to light. Cardiovascular:      Rate and Rhythm: Normal rate and regular rhythm. Heart sounds: Normal heart sounds. Pulmonary:      Effort: Pulmonary effort is normal.      Breath sounds: Normal air entry. Wheezing present. Comments: Dry cough noted  Musculoskeletal:      Cervical back: Normal range of motion and neck supple. Lymphadenopathy:      Cervical: No cervical adenopathy.    Skin:     General: Skin is warm and dry. Neurological:      General: No focal deficit present. Mental Status: She is alert and oriented to person, place, and time. Psychiatric:         Behavior: Behavior normal.         Thought Content: Thought content normal.       Assessment and Plan:    No results found for this visit on 01/02/23. Diagnosis Orders   1. Acute cough  benzonatate (TESSALON) 100 MG capsule      2. Moderate persistent asthma with exacerbation  predniSONE (DELTASONE) 20 MG tablet        Take prednisone as directed. Take tessalon as directed for cough. Continue albuterol nebulizer as directed for wheezes. Follow up with PCP if symptoms persist or worsen. The use, risks, benefits, and side effects of prescribed or recommended medications were discussed. All questions were answered and the patient/caregiver voiced understanding. No orders of the defined types were placed in this encounter.         Electronically signed by LETITIA Friedman CNP on 1/2/23 at 8:52 AM EST

## 2023-01-16 ENCOUNTER — OFFICE VISIT (OUTPATIENT)
Dept: PRIMARY CARE CLINIC | Age: 73
End: 2023-01-16
Payer: COMMERCIAL

## 2023-01-16 ENCOUNTER — HOSPITAL ENCOUNTER (OUTPATIENT)
Age: 73
Setting detail: SPECIMEN
Discharge: HOME | End: 2023-01-16
Payer: MEDICARE

## 2023-01-16 VITALS
DIASTOLIC BLOOD PRESSURE: 68 MMHG | BODY MASS INDEX: 32.44 KG/M2 | HEIGHT: 69 IN | HEART RATE: 54 BPM | SYSTOLIC BLOOD PRESSURE: 102 MMHG | TEMPERATURE: 98.9 F | WEIGHT: 219 LBS | OXYGEN SATURATION: 95 %

## 2023-01-16 DIAGNOSIS — R35.0 URINARY FREQUENCY: ICD-10-CM

## 2023-01-16 DIAGNOSIS — N30.00 ACUTE CYSTITIS WITHOUT HEMATURIA: Primary | ICD-10-CM

## 2023-01-16 LAB
BACTERIA: ABNORMAL
BILIRUBIN URINE: NEGATIVE
EPITHELIAL CELLS UA: ABNORMAL /HPF (ref 0–5)
GLUCOSE URINE: NEGATIVE
KETONES, URINE: NEGATIVE
LEUKOCYTE ESTERASE, URINE: ABNORMAL
NITRITE, URINE: NEGATIVE
PH UA: 6 (ref 5–6)
PROTEIN UA: NEGATIVE
RBC UA: ABNORMAL /HPF (ref 0–4)
SPECIFIC GRAVITY UA: 1 (ref 1.01–1.02)
URINE HGB: NEGATIVE
UROBILINOGEN, URINE: NORMAL
WBC UA: ABNORMAL /HPF (ref 0–4)

## 2023-01-16 PROCEDURE — 1036F TOBACCO NON-USER: CPT | Performed by: NURSE PRACTITIONER

## 2023-01-16 PROCEDURE — 87086 URINE CULTURE/COLONY COUNT: CPT

## 2023-01-16 PROCEDURE — 1090F PRES/ABSN URINE INCON ASSESS: CPT | Performed by: NURSE PRACTITIONER

## 2023-01-16 PROCEDURE — 87088 URINE BACTERIA CULTURE: CPT

## 2023-01-16 PROCEDURE — 1123F ACP DISCUSS/DSCN MKR DOCD: CPT | Performed by: NURSE PRACTITIONER

## 2023-01-16 PROCEDURE — 81001 URINALYSIS AUTO W/SCOPE: CPT

## 2023-01-16 PROCEDURE — G8427 DOCREV CUR MEDS BY ELIG CLIN: HCPCS | Performed by: NURSE PRACTITIONER

## 2023-01-16 PROCEDURE — G8399 PT W/DXA RESULTS DOCUMENT: HCPCS | Performed by: NURSE PRACTITIONER

## 2023-01-16 PROCEDURE — 3017F COLORECTAL CA SCREEN DOC REV: CPT | Performed by: NURSE PRACTITIONER

## 2023-01-16 PROCEDURE — G8417 CALC BMI ABV UP PARAM F/U: HCPCS | Performed by: NURSE PRACTITIONER

## 2023-01-16 PROCEDURE — 99213 OFFICE O/P EST LOW 20 MIN: CPT | Performed by: NURSE PRACTITIONER

## 2023-01-16 PROCEDURE — 87186 SC STD MICRODIL/AGAR DIL: CPT

## 2023-01-16 PROCEDURE — G8484 FLU IMMUNIZE NO ADMIN: HCPCS | Performed by: NURSE PRACTITIONER

## 2023-01-16 RX ORDER — CEPHALEXIN 500 MG/1
CAPSULE ORAL
COMMUNITY
Start: 2023-01-09 | End: 2023-01-16 | Stop reason: ALTCHOICE

## 2023-01-16 RX ORDER — NITROFURANTOIN 25; 75 MG/1; MG/1
100 CAPSULE ORAL 2 TIMES DAILY
Qty: 14 CAPSULE | Refills: 0 | Status: SHIPPED | OUTPATIENT
Start: 2023-01-16 | End: 2023-01-23

## 2023-01-16 ASSESSMENT — ENCOUNTER SYMPTOMS
NAUSEA: 0
RESPIRATORY NEGATIVE: 1
VOMITING: 0

## 2023-01-16 NOTE — PROGRESS NOTES
[facility]             [address], [address]                        Telephone [phone]             Fax [phone]     Nayla Parsons  1950  EQA:[id number]   Date of visit:  1/16/2023    Subjective:    Nayla Parsons is a 67 y.o.  female who presents to [facility] today (1/16/2023) for evaluation of:    Chief Complaint   Patient presents with    Urinary Frequency     Urgency,pain with urination,abdominal cramping. Sx 2 days        Urinary Frequency   This is a new problem. The current episode started yesterday. The problem occurs every urination. The pain is at a severity of 1/10. There has been no fever. She is Not sexually active. There is No history of pyelonephritis. Associated symptoms include frequency and urgency. Pertinent negatives include no discharge, hematuria, nausea or vomiting. Associated symptoms comments: Low abdominal cramping, decreased; dysuria. She has tried increased fluids for the symptoms. The treatment provided no relief. History of interstitial cystitis. Increased stress with health and personal life.       She has the following problem list:  Patient Active Problem List   Diagnosis    Chronic cough    Chronic maxillary sinusitis    Chronic ethmoidal sinusitis    Chronic frontal sinusitis    Nasal vestibulitis    Epistaxis    Nasal septal deviation    Nasal turbinate hypertrophy    Chronic sphenoidal sinusitis    Sleep apnea    Fibromyalgia    GERD (gastroesophageal reflux disease)    Depression    Vitamin D deficiency    Intrinsic eczema    Postmenopausal bleeding    Intramural leiomyoma of uterus    Allergic rhinitis    Anxiety state    Atrial flutter (HCC)    Diverticulosis of colon    Hyperlipidemia    Myalgia and myositis    Arthritis of knee, degenerative    Osteoarthritis of left knee    Pancreatic cyst    Backache    Asthma    Aftercare following joint replacement surgery    Artificial knee joint present    Primary localized osteoarthrosis of ankle and foot    Mild episode of recurrent major depressive disorder (HCC)    Chronic migraine without aura with status migrainosus, not intractable    History of D&C HSCOPE 8/11/2021        Current medications are:  Current Outpatient Medications   Medication Sig Dispense Refill    nitrofurantoin, macrocrystal-monohydrate, (MACROBID) 100 MG capsule Take 1 capsule by mouth 2 times daily for 7 days 14 capsule 0    FLUoxetine (PROZAC) 40 MG capsule take 1 capsule by mouth once daily 90 capsule 0    Cyanocobalamin (VITAMIN B 12 PO) Take by mouth      flecainide (TAMBOCOR) 100 MG tablet Take 1 tablet by mouth in the morning and at bedtime      famotidine (PEPCID) 20 MG tablet Take 1 tablet by mouth 2 times daily 60 tablet 5    ELIQUIS 5 MG TABS tablet take 1 tablet by mouth twice a day      dilTIAZem (CARDIZEM CD) 120 MG extended release capsule take 1 capsule by mouth once daily      magnesium oxide (MAG-OX) 400 MG tablet Take 400 mg by mouth daily      albuterol (PROVENTIL) (2.5 MG/3ML) 0.083% nebulizer solution Take 3 mLs by nebulization every 4 hours as needed for Wheezing or Shortness of Breath 125 mL 1    hydrOXYzine (ATARAX) 25 MG tablet TAKE 1 TABLET BY MOUTH NIGHTLY AS NEEDED (INTERSTITIAL CYSTITIS) 90 tablet 0    Multiple Vitamins-Minerals (PRESERVISION AREDS 2) CHEW Take 1 each by mouth 2 times daily       Ascorbic Acid (VITAMIN C PO) Take 1 tablet by mouth daily       Cholecalciferol (VITAMIN D3 PO) Take 2,000 Units by mouth daily       Calcium Acetate, Phos Binder, (CALCIUM ACETATE PO) Take by mouth (Patient not taking: Reported on 1/16/2023)      Nebulizers (COMPRESSOR/NEBULIZER) MISC Use as directed 1 each 3     No current facility-administered medications for this visit. She is allergic to celebrex [celecoxib] and codeine. .    She  reports that she quit smoking about 29 years ago.  Her smoking use included cigarettes. She started smoking about 47 years ago. She has a 22.00 pack-year smoking history. She has never used smokeless tobacco.      Objective:    Vitals:    01/16/23 1435   BP: 102/68   Pulse: 54   Temp: 98.9 °F (37.2 °C)   TempSrc: Tympanic   SpO2: 95%   Weight: 219 lb (99.3 kg)   Height: 5' 8.5\" (1.74 m)     Body mass index is 32.81 kg/m². Review of Systems   Constitutional: Negative. Respiratory: Negative. Cardiovascular: Negative. Gastrointestinal:  Negative for nausea and vomiting. Genitourinary:  Positive for dysuria, frequency, pelvic pain and urgency. Negative for hematuria and vaginal discharge. Physical Exam  Vitals and nursing note reviewed. Constitutional:       Appearance: Normal appearance. She is well-developed. HENT:      Head: Normocephalic. Jaw: There is normal jaw occlusion. Mouth/Throat:      Lips: Pink. Mouth: Mucous membranes are moist.      Pharynx: Oropharynx is clear. Uvula midline. Eyes:      Pupils: Pupils are equal, round, and reactive to light. Cardiovascular:      Rate and Rhythm: Normal rate and regular rhythm. Heart sounds: Normal heart sounds. Pulmonary:      Effort: Pulmonary effort is normal.      Breath sounds: Normal breath sounds and air entry. Abdominal:      General: Abdomen is flat. Bowel sounds are normal.      Palpations: Abdomen is soft. Tenderness: There is no abdominal tenderness. There is no right CVA tenderness or left CVA tenderness. Musculoskeletal:      Cervical back: Normal range of motion and neck supple. Skin:     General: Skin is warm and dry. Neurological:      General: No focal deficit present. Mental Status: She is alert and oriented to person, place, and time. Psychiatric:         Behavior: Behavior normal.         Thought Content:  Thought content normal.       Assessment and Plan:    Hospital Outpatient Visit on 01/16/2023   Component Date Value Ref Range Status    Glucose, Ur 01/16/2023 NEGATIVE  NEGATIVE Final    Bilirubin Urine 01/16/2023 NEGATIVE  NEGATIVE Final    Ketones, Urine 01/16/2023 NEGATIVE  NEGATIVE Final    Specific Gravity, UA 01/16/2023 1.005 (A)  1.010 - 1.025 Final    Urine Hgb 01/16/2023 NEGATIVE  NEGATIVE Final    pH, UA 01/16/2023 6.0  5.0 - 6.0 Final    Protein, UA 01/16/2023 NEGATIVE  NEGATIVE Final    Urobilinogen, Urine 01/16/2023 Normal  Normal Final    Nitrite, Urine 01/16/2023 NEGATIVE  NEGATIVE Final    Leukocyte Esterase, Urine 01/16/2023 1+ (A)  NEGATIVE Final    WBC, UA 01/16/2023 0 TO 4  0 - 4 /HPF Final    RBC, UA 01/16/2023 0 TO 4  0 - 4 /HPF Final    Epithelial Cells UA 01/16/2023 0 TO 4  0 - 5 /HPF Final    Bacteria, UA 01/16/2023 TRACE (A)  None Final          Diagnosis Orders   1. Acute cystitis without hematuria  nitrofurantoin, macrocrystal-monohydrate, (MACROBID) 100 MG capsule      2. Urinary frequency  Urinalysis with Reflex to Culture    Culture, Urine        I reviewed labs with patient. Complete full course of antibiotic. Increase water intake. Call or return to clinic as needed if these symptoms worsen or fail to improve in the next 48 hours. If urine culture was sent, the patient will be notified of results. The use, risks, benefits, and side effects of prescribed or recommended medications were discussed. All questions were answered and the patient/caregiver voiced understanding. No orders of the defined types were placed in this encounter.         Electronically signed by LETITIA Rhoades CNP on 1/16/23 at 2:56 PM EST

## 2023-01-18 LAB
CULTURE: ABNORMAL
SPECIMEN DESCRIPTION: ABNORMAL

## 2023-01-18 NOTE — RESULT ENCOUNTER NOTE
Please call and notify patient of urine culture results. They were prescribed an antibiotic during their visit that should cover the organism identified in the culture. Continue current medication until gone and drink plenty of fluids. No further recommendations at this time. Follow up with PCP if no improvement after completing medications.

## 2023-02-09 ENCOUNTER — HOSPITAL ENCOUNTER (OUTPATIENT)
Dept: GENERAL RADIOLOGY | Age: 73
Discharge: HOME | End: 2023-02-11
Payer: MEDICARE

## 2023-02-09 ENCOUNTER — OFFICE VISIT (OUTPATIENT)
Dept: FAMILY MEDICINE CLINIC | Age: 73
End: 2023-02-09
Payer: MEDICARE

## 2023-02-09 VITALS
DIASTOLIC BLOOD PRESSURE: 70 MMHG | TEMPERATURE: 98 F | BODY MASS INDEX: 32.29 KG/M2 | OXYGEN SATURATION: 98 % | SYSTOLIC BLOOD PRESSURE: 118 MMHG | HEIGHT: 69 IN | WEIGHT: 218 LBS | HEART RATE: 70 BPM

## 2023-02-09 DIAGNOSIS — R10.31 BILATERAL GROIN PAIN: Primary | ICD-10-CM

## 2023-02-09 DIAGNOSIS — R10.32 BILATERAL GROIN PAIN: Primary | ICD-10-CM

## 2023-02-09 DIAGNOSIS — R10.32 BILATERAL GROIN PAIN: ICD-10-CM

## 2023-02-09 DIAGNOSIS — M19.012 BILATERAL SHOULDER REGION ARTHRITIS: ICD-10-CM

## 2023-02-09 DIAGNOSIS — M19.011 BILATERAL SHOULDER REGION ARTHRITIS: ICD-10-CM

## 2023-02-09 DIAGNOSIS — R10.31 BILATERAL GROIN PAIN: ICD-10-CM

## 2023-02-09 PROCEDURE — 20610 DRAIN/INJ JOINT/BURSA W/O US: CPT | Performed by: FAMILY MEDICINE

## 2023-02-09 PROCEDURE — 73522 X-RAY EXAM HIPS BI 3-4 VIEWS: CPT

## 2023-02-09 PROCEDURE — 99213 OFFICE O/P EST LOW 20 MIN: CPT

## 2023-02-09 PROCEDURE — 73521 X-RAY EXAM HIPS BI 2 VIEWS: CPT

## 2023-02-09 RX ORDER — TRIAMCINOLONE ACETONIDE 40 MG/ML
40 INJECTION, SUSPENSION INTRA-ARTICULAR; INTRAMUSCULAR ONCE
Status: COMPLETED | OUTPATIENT
Start: 2023-02-09 | End: 2023-02-09

## 2023-02-09 RX ORDER — B-COMPLEX WITH VITAMIN C
1 TABLET ORAL DAILY
COMMUNITY

## 2023-02-09 RX ADMIN — TRIAMCINOLONE ACETONIDE 40 MG: 40 INJECTION, SUSPENSION INTRA-ARTICULAR; INTRAMUSCULAR at 15:24

## 2023-02-09 RX ADMIN — TRIAMCINOLONE ACETONIDE 40 MG: 40 INJECTION, SUSPENSION INTRA-ARTICULAR; INTRAMUSCULAR at 15:23

## 2023-02-09 SDOH — ECONOMIC STABILITY: HOUSING INSECURITY
IN THE LAST 12 MONTHS, WAS THERE A TIME WHEN YOU DID NOT HAVE A STEADY PLACE TO SLEEP OR SLEPT IN A SHELTER (INCLUDING NOW)?: PATIENT REFUSED

## 2023-02-09 SDOH — ECONOMIC STABILITY: FOOD INSECURITY: WITHIN THE PAST 12 MONTHS, YOU WORRIED THAT YOUR FOOD WOULD RUN OUT BEFORE YOU GOT MONEY TO BUY MORE.: PATIENT DECLINED

## 2023-02-09 SDOH — ECONOMIC STABILITY: INCOME INSECURITY: HOW HARD IS IT FOR YOU TO PAY FOR THE VERY BASICS LIKE FOOD, HOUSING, MEDICAL CARE, AND HEATING?: PATIENT DECLINED

## 2023-02-09 SDOH — ECONOMIC STABILITY: FOOD INSECURITY: WITHIN THE PAST 12 MONTHS, THE FOOD YOU BOUGHT JUST DIDN'T LAST AND YOU DIDN'T HAVE MONEY TO GET MORE.: PATIENT DECLINED

## 2023-02-09 ASSESSMENT — PATIENT HEALTH QUESTIONNAIRE - PHQ9
8. MOVING OR SPEAKING SO SLOWLY THAT OTHER PEOPLE COULD HAVE NOTICED. OR THE OPPOSITE, BEING SO FIGETY OR RESTLESS THAT YOU HAVE BEEN MOVING AROUND A LOT MORE THAN USUAL: 0
SUM OF ALL RESPONSES TO PHQ QUESTIONS 1-9: 0
SUM OF ALL RESPONSES TO PHQ9 QUESTIONS 1 & 2: 0
6. FEELING BAD ABOUT YOURSELF - OR THAT YOU ARE A FAILURE OR HAVE LET YOURSELF OR YOUR FAMILY DOWN: 0
SUM OF ALL RESPONSES TO PHQ QUESTIONS 1-9: 0
7. TROUBLE CONCENTRATING ON THINGS, SUCH AS READING THE NEWSPAPER OR WATCHING TELEVISION: 0
SUM OF ALL RESPONSES TO PHQ QUESTIONS 1-9: 0
9. THOUGHTS THAT YOU WOULD BE BETTER OFF DEAD, OR OF HURTING YOURSELF: 0
2. FEELING DOWN, DEPRESSED OR HOPELESS: 0
4. FEELING TIRED OR HAVING LITTLE ENERGY: 0
10. IF YOU CHECKED OFF ANY PROBLEMS, HOW DIFFICULT HAVE THESE PROBLEMS MADE IT FOR YOU TO DO YOUR WORK, TAKE CARE OF THINGS AT HOME, OR GET ALONG WITH OTHER PEOPLE: 0
5. POOR APPETITE OR OVEREATING: 0
1. LITTLE INTEREST OR PLEASURE IN DOING THINGS: 0
SUM OF ALL RESPONSES TO PHQ QUESTIONS 1-9: 0
3. TROUBLE FALLING OR STAYING ASLEEP: 0

## 2023-02-09 ASSESSMENT — ENCOUNTER SYMPTOMS
ABDOMINAL PAIN: 1
COUGH: 0
WHEEZING: 0
CHEST TIGHTNESS: 0
SHORTNESS OF BREATH: 0

## 2023-02-09 NOTE — PROGRESS NOTES
[facility]  [address] [address]  Dept: [phone]  Dept Fax: [phone]  Loc: [phone]    Francoise Eisenmenger is a 67 y.o. female who presents today for her medical conditions/complaints as noted below. Francoise Eisenmenger is c/o of   Chief Complaint   Patient presents with    Pain     Right more vs left; started last Summer    Groin Pain     Bilateral; on/off years, getting worse       HPI:     HPI Here today for pain in her groin and armpits    She has been having pain in her armpits last summer. She notices the pain is worse on the right arm at night when she lays down. No pain in the shoulders. No numbness, tingling or weakness in her arms. No lumps that she has felt in the armpit. It is much worse if she reaches for something as well as lay down. The left arm is starting to hurt now too. She has chronic pain in her neck. She has pain in groin off and on for years and it has been constant for the past few months. She is noticing the pain was worse on the left and now is moving to the right. The pain in her groin is worse when she moves her hips to the side. She has pain when sitting in the car or on a hard chair. She does not take anything for it. No weakness in one leg more than another. No tingling in her legs. She has nearly fallen and she feels wobbly and stumbling.        Past Medical History:   Diagnosis Date    Allergic rhinitis     Anxiety     Asthma     Bronchitis     Chest pain     Chronic back pain     Depression     Fibromyalgia     GERD (gastroesophageal reflux disease)     Headache(784.0)     Heart palpitations     Irregular bowel habits     Macular degeneration     beginning    Measles     Osteoarthritis     Restless legs syndrome     Tachycardia     Unspecified sleep apnea     Wet senile macular degeneration (HCC)     bilateral- Sees a retinal Dr Pee Goodson Tobacco Use    Smoking status: Former     Packs/day: 1.00     Years: 22.00     Pack years: 22.00     Types: Cigarettes     Start date: 1975     Quit date: 1993     Years since quittin.2    Smokeless tobacco: Never   Substance Use Topics    Alcohol use: Yes     Alcohol/week: 2.0 standard drinks     Types: 2 Glasses of wine per week     Current Outpatient Medications   Medication Sig Dispense Refill    Lactobacillus (PROBIOTIC ACIDOPHILUS PO) Take 1 tablet by mouth daily      FLUoxetine (PROZAC) 40 MG capsule take 1 capsule by mouth once daily 90 capsule 0    Cyanocobalamin (VITAMIN B 12 PO) Take by mouth      flecainide (TAMBOCOR) 100 MG tablet Take 1 tablet by mouth in the morning and at bedtime      famotidine (PEPCID) 20 MG tablet Take 1 tablet by mouth 2 times daily 60 tablet 5    ELIQUIS 5 MG TABS tablet take 1 tablet by mouth twice a day      dilTIAZem (CARDIZEM CD) 120 MG extended release capsule take 1 capsule by mouth once daily      magnesium oxide (MAG-OX) 400 MG tablet Take 400 mg by mouth daily      albuterol (PROVENTIL) (2.5 MG/3ML) 0.083% nebulizer solution Take 3 mLs by nebulization every 4 hours as needed for Wheezing or Shortness of Breath 125 mL 1    hydrOXYzine (ATARAX) 25 MG tablet TAKE 1 TABLET BY MOUTH NIGHTLY AS NEEDED (INTERSTITIAL CYSTITIS) 90 tablet 0    Multiple Vitamins-Minerals (PRESERVISION AREDS 2) CHEW Take 1 each by mouth 2 times daily       Ascorbic Acid (VITAMIN C PO) Take 1 tablet by mouth daily       Cholecalciferol (VITAMIN D3 PO) Take 2,000 Units by mouth daily       Nebulizers (COMPRESSOR/NEBULIZER) MISC Use as directed 1 each 3    Fiber Diet TABS Take 1 tablet by mouth daily       No current facility-administered medications for this visit.           Allergies   Allergen Reactions    Celebrex [Celecoxib] Rash     Whole body    Codeine Other (See Comments)     Gives her a headache       Subjective:     Review of Systems   Constitutional:  Negative for activity change, appetite change, chills, fatigue and fever. Respiratory:  Negative for cough, chest tightness, shortness of breath and wheezing. Cardiovascular:  Negative for chest pain, palpitations and leg swelling. Gastrointestinal:  Positive for abdominal pain (on the RUQ and some abdominal cramping). Musculoskeletal:  Positive for arthralgias (shoulders and hips) and gait problem. Skin:  Negative for rash. Neurological:  Negative for dizziness, syncope, weakness and light-headedness. Objective:      Physical Exam  Vitals and nursing note reviewed. Constitutional:       General: She is not in acute distress. Appearance: She is well-developed. Eyes:      Conjunctiva/sclera: Conjunctivae normal.   Neck:      Thyroid: No thyromegaly. Cardiovascular:      Rate and Rhythm: Normal rate and regular rhythm. Heart sounds: Normal heart sounds. No murmur heard. Pulmonary:      Effort: Pulmonary effort is normal. No respiratory distress. Breath sounds: Normal breath sounds. No wheezing. Musculoskeletal:      Right shoulder: Tenderness (mild on posterior side of shoulder) and crepitus (mild) present. Decreased range of motion (slight decrease in abduction). Normal strength. Left shoulder: Tenderness (on posterior side) and crepitus (mild) present. Decreased range of motion (decreased abduction). Normal strength. Cervical back: Normal range of motion and neck supple. Right hip: Tenderness (with extending hip) and bony tenderness (on the right over the trochanteric bursitis) present. Decreased range of motion (decreased external rotation). Left hip: Tenderness (with extension of hip) present. Decreased range of motion (abduction). Lymphadenopathy:      Cervical: No cervical adenopathy. Skin:     General: Skin is warm and dry. Findings: No erythema or rash. Neurological:      Mental Status: She is alert and oriented to person, place, and time. Psychiatric:         Mood and Affect: Mood normal.         Behavior: Behavior normal.         Thought Content: Thought content normal.         Judgment: Judgment normal.     /70   Pulse 70   Temp 98 °F (36.7 °C)   Ht 5' 8.5\" (1.74 m)   Wt 218 lb (98.9 kg)   SpO2 98%   BMI 32.66 kg/m²     Assessment:       Diagnosis Orders   1. Bilateral groin pain  XR HIP 3-4 VW W PELVIS BILATERAL      2. Bilateral shoulder region arthritis  DC ARTHROCENTESIS ASPIR&/INJ MAJOR JT/BURSA W/O US    triamcinolone acetonide (KENALOG-40) injection 40 mg    DC ARTHROCENTESIS ASPIR&/INJ MAJOR JT/BURSA W/O US    triamcinolone acetonide (KENALOG-40) injection 40 mg                Plan:       Groin pain: new; likely hip pain caused by arthritis so I ordered an xray to look for the severity of arthritis. she was told that if the xray shows mild arthritis I will refer to PT. If the xray shows moderate to severe arthritis I will refer to ortho for injections. Shoulder arthritis: new; I recommended steroid injections and home exercises. She agreed to both. Procedure Note    PREOP DIAGNOSIS:  [] Right []  Left    [x] Bilateral Shoulder Pain    POSTOP DIAGNOSIS:  [] Right []  Left    [x] Bilateral Shoulder Pain    OPERATION:  [] Right []  Left    [x] Bilateral INTRA-ARTICULAR Glenohumeral Injection    PROCEDURE:  After sterile prep, a posterior approach was used. 40 mg of Kenalog and 2 mL of 2% lidocaine without epi injected intra-articularly without incident. Pre- and post neurovascular status verified. No complications noted. Return in about 1 month (around 3/9/2023) for [address]     Orders Placed This Encounter   Procedures    XR HIP 3-4 VW W PELVIS BILATERAL     Standing Status:   Future     Number of Occurrences:   1     Standing Expiration Date:   2/9/2024    DC ARTHROCENTESIS ASPIR&/INJ MAJOR JT/BURSA W/O US    DC ARTHROCENTESIS ASPIR&/INJ MAJOR JT/BURSA W/O US     Orders Placed This Encounter   Medications triamcinolone acetonide (KENALOG-40) injection 40 mg    triamcinolone acetonide (KENALOG-40) injection 40 mg       Patientgiven educational materials - see patient instructions. Discussed use, benefit,and side effects of prescribed medications. All patient questions answered. Ptvoiced understanding. Reviewed health maintenance. Instructed to continue currentmedications, diet and exercise. Patient agreed with treatment plan. Follow up asdirected.      Electronically signed by Sandy Lala MD on 2/9/2023 at 10:02 PM

## 2023-02-10 ENCOUNTER — TELEPHONE (OUTPATIENT)
Dept: FAMILY MEDICINE CLINIC | Age: 73
End: 2023-02-10

## 2023-02-10 NOTE — TELEPHONE ENCOUNTER
Pt states after she received injection to left shoulder, she reports having pain down to elbow now. Denies any cardiac symptoms. \"Did not have elbow pain prior. \"    Please advise.

## 2023-02-10 NOTE — TELEPHONE ENCOUNTER
She may have some swelling around a nerve. Have her move the arm as much as she can and ice/heat (whichever feels better) to the shoulder.

## 2023-03-10 NOTE — TELEPHONE ENCOUNTER
Jyay Parsons called requesting a refill of the below medication which has been pended for you:     Requested Prescriptions     Pending Prescriptions Disp Refills    montelukast (SINGULAIR) 10 MG tablet [Pharmacy Med Name: MONTELUKAST SOD 10 MG TABLET] 90 tablet 1     Sig: take 1 tablet by mouth every evening       Last Appointment Date: 11/17/2020  Next Appointment Date: 5/18/2021    Allergies   Allergen Reactions    Codeine Other (See Comments)     Gives her a headache    Celebrex [Celecoxib] Rash     Whole body no

## 2023-03-16 RX ORDER — FLUOXETINE HYDROCHLORIDE 40 MG/1
CAPSULE ORAL
Qty: 90 CAPSULE | Refills: 1 | Status: SHIPPED | OUTPATIENT
Start: 2023-03-16

## 2023-03-16 NOTE — TELEPHONE ENCOUNTER
Ligia Tavera called requesting a refill of the below medication which has been pended for you:     Requested Prescriptions     Pending Prescriptions Disp Refills    FLUoxetine (PROZAC) 40 MG capsule [Pharmacy Med Name: FLUOXETINE HCL 40 MG CAPSULE] 90 capsule 1     Sig: take 1 capsule by mouth once daily       Last Appointment Date: 2/9/2023  Next Appointment Date: Visit date not found    Allergies   Allergen Reactions    Celebrex [Celecoxib] Rash     Whole body    Codeine Other (See Comments)     Gives her a headache

## 2023-05-06 ENCOUNTER — OFFICE VISIT (OUTPATIENT)
Dept: PRIMARY CARE CLINIC | Age: 73
End: 2023-05-06
Payer: COMMERCIAL

## 2023-05-06 VITALS
SYSTOLIC BLOOD PRESSURE: 114 MMHG | WEIGHT: 210.13 LBS | BODY MASS INDEX: 31.12 KG/M2 | DIASTOLIC BLOOD PRESSURE: 76 MMHG | TEMPERATURE: 97.4 F | HEART RATE: 80 BPM | HEIGHT: 69 IN | OXYGEN SATURATION: 96 % | RESPIRATION RATE: 20 BRPM

## 2023-05-06 DIAGNOSIS — J01.40 ACUTE NON-RECURRENT PANSINUSITIS: Primary | ICD-10-CM

## 2023-05-06 PROCEDURE — 99212 OFFICE O/P EST SF 10 MIN: CPT | Performed by: STUDENT IN AN ORGANIZED HEALTH CARE EDUCATION/TRAINING PROGRAM

## 2023-05-06 RX ORDER — AMOXICILLIN AND CLAVULANATE POTASSIUM 875; 125 MG/1; MG/1
1 TABLET, FILM COATED ORAL 2 TIMES DAILY
Qty: 20 TABLET | Refills: 0 | Status: SHIPPED | OUTPATIENT
Start: 2023-05-06 | End: 2023-05-16

## 2023-05-06 ASSESSMENT — ENCOUNTER SYMPTOMS
NAUSEA: 0
SINUS PRESSURE: 1
CONSTIPATION: 0
TROUBLE SWALLOWING: 0
DIARRHEA: 0
ABDOMINAL PAIN: 0
BLOOD IN STOOL: 0
COUGH: 1
EYE PAIN: 0
SORE THROAT: 1
SINUS PAIN: 1
SHORTNESS OF BREATH: 0
VOMITING: 0

## 2023-05-06 NOTE — PROGRESS NOTES
[facility]             [address] [facility], [place], [address]                        Telephone [phone]             Fax [phone]       Eboni Brown  :  1950  Age:  67 y.o. MRN:  [id number]  Date of visit:  2023     Assessment and Plan:    1. Acute non-recurrent pansinusitis  Likely sinusitis, symptoms began with her  and he has been given an antibiotic for his illness and Antibiotic seems to be working well for him. We will try Augmentin twice daily for total 10 days. Recommend return to the urgent care symptoms worsen or fail to improve. - amoxicillin-clavulanate (AUGMENTIN) 875-125 MG per tablet; Take 1 tablet by mouth 2 times daily for 10 days  Dispense: 20 tablet; Refill: 0      Subjective:    Eboni Brown is a 67 y.o. female who presents to [facility] today (2023) for evaluation of:  Sinus Problem (Started Thursday. Sore throat, congestion, cough, ear pain, headache, dark green nasal drainage, teeth pain last night, body aches.  recently ill. Is taking mucinex, tylenol, generic benadryl allergy medication)    78yo female who presents to the  for evaluation of sore throat, congestion, sinus pressure, and sinus drainage. States that he  had symptoms last week and was given amoxil which has helped his symptoms. Has tried OTC mucinex, benadryl, tylenol with some mild relief. Chief Complaint   Patient presents with    Sinus Problem     Started Thursday. Sore throat, congestion, cough, ear pain, headache, dark green nasal drainage, teeth pain last night, body aches.  recently ill.  Is taking mucinex, tylenol, generic benadryl allergy medication     She has the following problem list:  Patient Active Problem List   Diagnosis    Chronic cough    Chronic maxillary sinusitis    Chronic ethmoidal sinusitis    Chronic frontal sinusitis    Nasal vestibulitis

## 2023-05-11 ENCOUNTER — PATIENT MESSAGE (OUTPATIENT)
Dept: FAMILY MEDICINE CLINIC | Age: 73
End: 2023-05-11

## 2023-05-11 RX ORDER — PREDNISONE 20 MG/1
20 TABLET ORAL 2 TIMES DAILY
Qty: 10 TABLET | Refills: 0 | Status: SHIPPED | OUTPATIENT
Start: 2023-05-11 | End: 2023-05-16

## 2023-05-11 NOTE — TELEPHONE ENCOUNTER
From: Nicole Almeida  Sent: 5/11/2023 11:25 AM EDT  To: [facility] Clinical Staff  Subject: Sinus infection lung congestion    Yes

## 2023-05-11 NOTE — TELEPHONE ENCOUNTER
Pt would like the steroids sent to [facility], please. Voiced understanding to take an antihistamine and flonase.

## 2023-05-11 NOTE — TELEPHONE ENCOUNTER
Please let her know that since Augmentin didn't help this is likely due to allergies or a virus. I can send in some steroids to try to help for her, but I would also recommend a daily antihistamine and flonase if needed.

## 2023-09-25 RX ORDER — FLUOXETINE HYDROCHLORIDE 40 MG/1
CAPSULE ORAL
Qty: 90 CAPSULE | Refills: 0 | Status: SHIPPED | OUTPATIENT
Start: 2023-09-25

## 2023-09-28 ENCOUNTER — OFFICE VISIT (OUTPATIENT)
Dept: FAMILY MEDICINE CLINIC | Age: 73
End: 2023-09-28
Payer: COMMERCIAL

## 2023-09-28 VITALS
HEIGHT: 69 IN | BODY MASS INDEX: 31.31 KG/M2 | SYSTOLIC BLOOD PRESSURE: 118 MMHG | HEART RATE: 67 BPM | WEIGHT: 211.4 LBS | OXYGEN SATURATION: 98 % | DIASTOLIC BLOOD PRESSURE: 78 MMHG | TEMPERATURE: 97.8 F

## 2023-09-28 DIAGNOSIS — E78.2 MIXED HYPERLIPIDEMIA: ICD-10-CM

## 2023-09-28 DIAGNOSIS — I48.92 ATRIAL FLUTTER, UNSPECIFIED TYPE (HCC): ICD-10-CM

## 2023-09-28 DIAGNOSIS — Z12.31 ENCOUNTER FOR SCREENING MAMMOGRAM FOR MALIGNANT NEOPLASM OF BREAST: ICD-10-CM

## 2023-09-28 DIAGNOSIS — G89.29 CHRONIC PAIN OF RIGHT ANKLE: ICD-10-CM

## 2023-09-28 DIAGNOSIS — Z00.00 MEDICARE ANNUAL WELLNESS VISIT, SUBSEQUENT: Primary | ICD-10-CM

## 2023-09-28 DIAGNOSIS — F33.0 MILD EPISODE OF RECURRENT MAJOR DEPRESSIVE DISORDER (HCC): ICD-10-CM

## 2023-09-28 DIAGNOSIS — M25.571 CHRONIC PAIN OF RIGHT ANKLE: ICD-10-CM

## 2023-09-28 PROCEDURE — 3017F COLORECTAL CA SCREEN DOC REV: CPT | Performed by: FAMILY MEDICINE

## 2023-09-28 PROCEDURE — 1123F ACP DISCUSS/DSCN MKR DOCD: CPT | Performed by: FAMILY MEDICINE

## 2023-09-28 PROCEDURE — G0439 PPPS, SUBSEQ VISIT: HCPCS | Performed by: FAMILY MEDICINE

## 2023-09-28 PROCEDURE — L1902 AFO ANKLE GAUNTLET PRE OTS: HCPCS | Performed by: FAMILY MEDICINE

## 2023-09-28 RX ORDER — DIPHENHYDRAMINE HCL 25 MG
25 CAPSULE ORAL NIGHTLY
COMMUNITY

## 2023-09-28 ASSESSMENT — PATIENT HEALTH QUESTIONNAIRE - PHQ9
SUM OF ALL RESPONSES TO PHQ QUESTIONS 1-9: 5
4. FEELING TIRED OR HAVING LITTLE ENERGY: 2
1. LITTLE INTEREST OR PLEASURE IN DOING THINGS: 0
8. MOVING OR SPEAKING SO SLOWLY THAT OTHER PEOPLE COULD HAVE NOTICED. OR THE OPPOSITE, BEING SO FIGETY OR RESTLESS THAT YOU HAVE BEEN MOVING AROUND A LOT MORE THAN USUAL: 0
10. IF YOU CHECKED OFF ANY PROBLEMS, HOW DIFFICULT HAVE THESE PROBLEMS MADE IT FOR YOU TO DO YOUR WORK, TAKE CARE OF THINGS AT HOME, OR GET ALONG WITH OTHER PEOPLE: 0
3. TROUBLE FALLING OR STAYING ASLEEP: 3
SUM OF ALL RESPONSES TO PHQ QUESTIONS 1-9: 5
SUM OF ALL RESPONSES TO PHQ QUESTIONS 1-9: 5
5. POOR APPETITE OR OVEREATING: 0
2. FEELING DOWN, DEPRESSED OR HOPELESS: 0
6. FEELING BAD ABOUT YOURSELF - OR THAT YOU ARE A FAILURE OR HAVE LET YOURSELF OR YOUR FAMILY DOWN: 0
9. THOUGHTS THAT YOU WOULD BE BETTER OFF DEAD, OR OF HURTING YOURSELF: 0
SUM OF ALL RESPONSES TO PHQ QUESTIONS 1-9: 5
SUM OF ALL RESPONSES TO PHQ9 QUESTIONS 1 & 2: 0
7. TROUBLE CONCENTRATING ON THINGS, SUCH AS READING THE NEWSPAPER OR WATCHING TELEVISION: 0

## 2023-09-28 ASSESSMENT — ENCOUNTER SYMPTOMS
BACK PAIN: 0
CHEST TIGHTNESS: 0
SHORTNESS OF BREATH: 0
COUGH: 0
CONSTIPATION: 1
WHEEZING: 0
DIARRHEA: 0
BLOOD IN STOOL: 0
ABDOMINAL PAIN: 0

## 2023-09-28 ASSESSMENT — LIFESTYLE VARIABLES
HOW MANY STANDARD DRINKS CONTAINING ALCOHOL DO YOU HAVE ON A TYPICAL DAY: 1 OR 2
HOW OFTEN DO YOU HAVE A DRINK CONTAINING ALCOHOL: 2-4 TIMES A MONTH

## 2023-09-28 NOTE — PATIENT INSTRUCTIONS
Activity: Your Everyday Guide\" from [employer]. Call 0-421.406.3433 or search [employer] online. You need 7002-6518 mg of calcium and 8315-4881 IU of vitamin D per day. It is possible to meet your calcium requirement with diet alone, but a vitamin D supplement is usually necessary to meet this goal.  When exposed to the sun, use a sunscreen that protects against both UVA and UVB radiation with an SPF of 30 or greater. Reapply every 2 to 3 hours or after sweating, drying off with a towel, or swimming. Always wear a seat belt when traveling in a car. Always wear a helmet when riding a bicycle or motorcycle.

## 2023-09-28 NOTE — PROGRESS NOTES
Medicare Annual Wellness Visit    Freedom Simms is here for Medicare AWV (Annual wellness visit /)    Assessment & Plan   Medicare annual wellness visit, subsequent  -     [facility] Referral to ACP Clinical Specialist  Mild episode of recurrent major depressive disorder ([address])  Atrial flutter, unspecified type ([address])  -     Basic Metabolic Panel; Future  Encounter for screening mammogram for malignant neoplasm of breast  -     JONATHAN JUDY DIGITAL SCREEN BILATERAL; Future  Chronic pain of right ankle   Worsening; I suggested Ashanti take ibuprofen for pain and to ice the ankle for 10 min at least 3 times a day. I also suggested she use an ankle lace up brace, which was provided and to do ROM exercises when her pain has improved. George Castrejon was told to start with pedal pumps and then write the alphabet with her foot. George Lucía was also given additional exercises through the patient instructions. -     Afo ankle gauntlet pre ots  Mixed hyperlipidemia  -     Lipid Panel; Future    Orders Placed This Encounter   Procedures    JONATHAN JUDY DIGITAL SCREEN BILATERAL     Standing Status:   Future     Standing Expiration Date:   96/47/3661    Basic Metabolic Panel     Standing Status:   Future     Standing Expiration Date:   9/28/2024    Lipid Panel     Standing Status:   Future     Standing Expiration Date:   9/28/2024    [facility] Referral to [facility] Clinical Specialist     Referral Priority:   Routine     Referral Type: Other     Referral Reason:   Specialty Services Required     Number of Visits Requested:   1    Afo ankle gauntlet pre ots     Patient was prescribed an Aircast Air Sport Brace. The right ankle will require stabilization / immobilization from this semi-rigid / rigid orthosis to improve their function. The orthosis will assist in protecting the affected area, provide functional support and facilitate healing.     The patient was educated and fit by a healthcare professional with expert knowledge and specialization in

## 2023-09-29 ENCOUNTER — CLINICAL DOCUMENTATION (OUTPATIENT)
Dept: SPIRITUAL SERVICES | Age: 73
End: 2023-09-29

## 2023-09-29 NOTE — ACP (ADVANCE CARE PLANNING)
Advance Care Planning   Ambulatory ACP Specialist Patient Outreach    Date:  9/29/2023    ACP Specialist:  ROSA Hare    Outreach call to patient in follow-up to ACP Specialist referral from:Lilian Francis MD    [x] PCP  [x] Provider   [] Ambulatory Care Management [] Other     For:                  [x] Advance Directive Assistance              [] Complete Portable DNR order              [] Complete POST/POLST/MOST              [] Code Status Discussion             [] Discuss Goals of Care             [] Early ACP Decision-Making              [] Other (Specify)    Date Referral Received:9/28/2023    Next Step:   [x] ACP scheduled conversation  [] Outreach again in one week               [] Email / Mail [address]  [] Email / Mail Advance Directive   [] Closing referral.  Routing closure to referring provider/staff and to ACP Specialist . [] Closure letter mailed to patient with invitation to contact ACP Specialist if / when ready. [] Other (Specify here):         [x] At this time, Healthcare Decision Maker Is:  Advance Care Planning   Healthcare Decision Maker:    Primary Decision Maker: Luiza Norwood - Spouse - [phone]    Secondary Decision Maker: Porscheudayelvie Juan - Child - [phone]    Click here to complete [address] including selection of the Healthcare Decision Maker Relationship (ie \"Primary\"). [] Primary agent named in scanned advance directive. [x] Legal Next of Kin. [] Unable to determine legal decision maker at this time. Outreaches:       [x] 1st -  Date:  9/29/2023               Intervention:  [x] Spoke with Patient   [x] Left Voice mail [] Email / Mail    [] [employer]  [] Other [phone] : Outcomes: This Coordinator attempted to reach the patient offering an ACP conversation, but the patient was not reached, and a voice message has been left at the listed phone number. The [facility] has been updated to follow the [facility].  A

## 2023-10-10 ENCOUNTER — CLINICAL DOCUMENTATION (OUTPATIENT)
Dept: SPIRITUAL SERVICES | Age: 73
End: 2023-10-10

## 2023-10-10 NOTE — ACP (ADVANCE CARE PLANNING)
Advance Care Planning     Advance Care Planning Clinical Specialist  Conversation Note      Date of ACP Conversation: 10/10/2023    Conversation Conducted with: Patient with Decision Making Capacity    ACP Clinical Specialist: RUEL Bobo  Healthcare Decision Maker:     Current Designated Healthcare Decision Maker:     Primary Decision Maker: Citlali Bella Child - [phone]    Secondary Decision Maker: Yessica Manrique Child - [phone]  Click here to complete Healthcare Decision Makers including section of the Healthcare Decision Maker Relationship (ie \"Primary\")  Today we completed the ACP conversation, nomination of HCDM's, and completed pt's AD docs via [employer]. Care Preferences    Hospitalization: \"If your health worsens and it becomes clear that your chance of recovery is unlikely, what would your preference be regarding hospitalization? \"    Choice:  [] The patient wants hospitalization. [x] The patient prefers comfort-focused treatment without hospitalization. Ventilation: \"If you were in your present state of health and suddenly became very ill and were unable to breathe on your own, what would your preference be about the use of a ventilator (breathing machine) if it were available to you? \"      If the patient would desire the use of ventilator (breathing machine), answer \"yes\". If not, \"no\": no    \"If your health worsens and it becomes clear that your chance of recovery is unlikely, what would your preference be about the use of a ventilator (breathing machine) if it were available to you? \"     Would the patient desire the use of ventilator (breathing machine)?: No      Resuscitation  \"CPR works best to restart the heart when there is a sudden event, like a heart attack, in someone who is otherwise healthy. Unfortunately, CPR does not typically restart the heart for people who have serious health conditions or who are very sick. \"    \"In the event your heart stopped as a result of an

## 2023-10-26 ENCOUNTER — HOSPITAL ENCOUNTER (OUTPATIENT)
Dept: MAMMOGRAPHY | Age: 73
Discharge: HOME | End: 2023-10-28
Payer: MEDICARE

## 2023-10-26 VITALS — BODY MASS INDEX: 31.4 KG/M2 | HEIGHT: 69 IN | WEIGHT: 212 LBS

## 2023-10-26 DIAGNOSIS — Z12.31 ENCOUNTER FOR SCREENING MAMMOGRAM FOR MALIGNANT NEOPLASM OF BREAST: ICD-10-CM

## 2023-10-26 PROCEDURE — 77067 SCR MAMMO BI INCL CAD: CPT

## 2023-10-26 PROCEDURE — 77063 BREAST TOMOSYNTHESIS BI: CPT

## 2023-12-08 RX ORDER — HYDROXYZINE HYDROCHLORIDE 25 MG/1
25 TABLET, FILM COATED ORAL NIGHTLY PRN
Qty: 30 TABLET | Refills: 1 | Status: SHIPPED | OUTPATIENT
Start: 2023-12-08

## 2023-12-08 NOTE — TELEPHONE ENCOUNTER
Patient would like a rush on this medication because the patient had a death in the family and would like to go out of town.      [facility] called requesting a refill of the below medication which has been pended for you:     Requested Prescriptions     Pending Prescriptions Disp Refills    hydrOXYzine HCl (ATARAX) 25 MG tablet       Sig: Take 1 tablet by mouth nightly as needed (Interstitial cystitis) Pt forgot she has this and will start taking       Last Appointment Date: 9/28/2023  Next Appointment Date: 3/28/2024    Allergies   Allergen Reactions    Celebrex [Celecoxib] Rash     Whole body    Codeine Other (See Comments)     Gives her a headache

## 2023-12-08 NOTE — TELEPHONE ENCOUNTER
Rody Durham called requesting a refill of the below medication which has been pended for you:   Per Nakia Milan; Patient would like a rush on this medication because the patient had a death in the family and would like to go out of town.        Requested Prescriptions     Pending Prescriptions Disp Refills    hydrOXYzine HCl (ATARAX) 25 MG tablet 30 tablet 1     Sig: Take 1 tablet by mouth nightly as needed (Interstitial cystitis) Pt forgot she has this and will start taking       Last Appointment Date: 9/28/2023  Next Appointment Date: 3/28/2024    Allergies   Allergen Reactions    Celebrex [Celecoxib] Rash     Whole body    Codeine Other (See Comments)     Gives her a headache

## 2023-12-16 ENCOUNTER — HOSPITAL ENCOUNTER (OUTPATIENT)
Age: 73
Setting detail: SPECIMEN
Discharge: HOME | End: 2023-12-16
Payer: MEDICARE

## 2023-12-16 ENCOUNTER — OFFICE VISIT (OUTPATIENT)
Dept: PRIMARY CARE CLINIC | Age: 73
End: 2023-12-16
Payer: COMMERCIAL

## 2023-12-16 ENCOUNTER — HOSPITAL ENCOUNTER (OUTPATIENT)
Age: 73
Discharge: HOME | End: 2023-12-16
Payer: MEDICARE

## 2023-12-16 VITALS
WEIGHT: 214 LBS | HEART RATE: 65 BPM | HEIGHT: 69 IN | TEMPERATURE: 98.3 F | DIASTOLIC BLOOD PRESSURE: 80 MMHG | BODY MASS INDEX: 31.7 KG/M2 | OXYGEN SATURATION: 90 % | SYSTOLIC BLOOD PRESSURE: 130 MMHG | RESPIRATION RATE: 15 BRPM

## 2023-12-16 DIAGNOSIS — R82.71 BACTERIA IN URINE: ICD-10-CM

## 2023-12-16 DIAGNOSIS — R30.0 DYSURIA: ICD-10-CM

## 2023-12-16 DIAGNOSIS — R82.71 BACTERIA IN URINE: Primary | ICD-10-CM

## 2023-12-16 LAB
BACTERIA URNS QL MICRO: ABNORMAL
BILIRUB UR QL STRIP: NEGATIVE
CHARACTER UR: ABNORMAL
CLARITY UR: CLEAR
COLOR UR: YELLOW
EPI CELLS #/AREA URNS HPF: ABNORMAL /HPF (ref 0–5)
GLUCOSE UR STRIP-MCNC: NEGATIVE MG/DL
HGB UR QL STRIP.AUTO: ABNORMAL
KETONES UR STRIP-MCNC: NEGATIVE MG/DL
LEUKOCYTE ESTERASE UR QL STRIP: ABNORMAL
NITRITE UR QL STRIP: POSITIVE
PH UR STRIP: 6.5 [PH] (ref 5–6)
PROT UR STRIP-MCNC: NEGATIVE MG/DL
RBC #/AREA URNS HPF: ABNORMAL /HPF (ref 0–4)
SP GR UR STRIP: 1.01 (ref 1.01–1.02)
UROBILINOGEN UR STRIP-ACNC: NORMAL EU/DL (ref 0–1)
WBC #/AREA URNS HPF: ABNORMAL /HPF (ref 0–4)

## 2023-12-16 PROCEDURE — 1036F TOBACCO NON-USER: CPT | Performed by: FAMILY MEDICINE

## 2023-12-16 PROCEDURE — 1090F PRES/ABSN URINE INCON ASSESS: CPT | Performed by: FAMILY MEDICINE

## 2023-12-16 PROCEDURE — 99213 OFFICE O/P EST LOW 20 MIN: CPT | Performed by: FAMILY MEDICINE

## 2023-12-16 PROCEDURE — 81001 URINALYSIS AUTO W/SCOPE: CPT

## 2023-12-16 PROCEDURE — G8484 FLU IMMUNIZE NO ADMIN: HCPCS | Performed by: FAMILY MEDICINE

## 2023-12-16 PROCEDURE — 3017F COLORECTAL CA SCREEN DOC REV: CPT | Performed by: FAMILY MEDICINE

## 2023-12-16 PROCEDURE — 1123F ACP DISCUSS/DSCN MKR DOCD: CPT | Performed by: FAMILY MEDICINE

## 2023-12-16 PROCEDURE — G8399 PT W/DXA RESULTS DOCUMENT: HCPCS | Performed by: FAMILY MEDICINE

## 2023-12-16 PROCEDURE — G8427 DOCREV CUR MEDS BY ELIG CLIN: HCPCS | Performed by: FAMILY MEDICINE

## 2023-12-16 PROCEDURE — 87086 URINE CULTURE/COLONY COUNT: CPT

## 2023-12-16 PROCEDURE — G8417 CALC BMI ABV UP PARAM F/U: HCPCS | Performed by: FAMILY MEDICINE

## 2023-12-16 RX ORDER — CIPROFLOXACIN 500 MG/1
500 TABLET, FILM COATED ORAL 2 TIMES DAILY
Qty: 14 TABLET | Refills: 0 | Status: SHIPPED | OUTPATIENT
Start: 2023-12-16 | End: 2023-12-23

## 2023-12-17 LAB
MICROORGANISM SPEC CULT: ABNORMAL
SPECIMEN DESCRIPTION: ABNORMAL

## 2023-12-26 RX ORDER — FLUOXETINE HYDROCHLORIDE 40 MG/1
CAPSULE ORAL
Qty: 90 CAPSULE | Refills: 0 | Status: SHIPPED | OUTPATIENT
Start: 2023-12-26

## 2023-12-26 NOTE — TELEPHONE ENCOUNTER
Kei Edwards called requesting a refill of the below medication which has been pended for you:     Requested Prescriptions     Pending Prescriptions Disp Refills    FLUoxetine (PROZAC) 40 MG capsule [Pharmacy Med Name: FLUOXETINE HCL 40 MG CAPSULE] 90 capsule 0     Sig: take 1 capsule by mouth once daily       Last Appointment Date: 9/28/2023  Next Appointment Date: 3/28/2024    Allergies   Allergen Reactions    Celebrex [Celecoxib] Rash     Whole body    Codeine Other (See Comments)     Gives her a headache

## 2024-03-13 RX ORDER — HYDROXYZINE HYDROCHLORIDE 25 MG/1
TABLET, FILM COATED ORAL
Qty: 30 TABLET | Refills: 1 | Status: SHIPPED | OUTPATIENT
Start: 2024-03-13

## 2024-03-13 NOTE — TELEPHONE ENCOUNTER
Ashanti called requesting a refill of the below medication which has been pended for you:     Requested Prescriptions     Pending Prescriptions Disp Refills    hydrOXYzine HCl (ATARAX) 25 MG tablet [Pharmacy Med Name: HYDROXYZINE HCL 25MG TABS (WHITE)] 30 tablet 1     Sig: TAKE 1 TABLET BY MOUTH NIGHTLY IF NEEDED       Last Appointment Date: 9/28/2023  Next Appointment Date: 4/1/2024    Allergies   Allergen Reactions    Celebrex [Celecoxib] Rash     Whole body    Codeine Other (See Comments)     Gives her a headache

## 2024-04-01 ENCOUNTER — TELEMEDICINE (OUTPATIENT)
Dept: FAMILY MEDICINE CLINIC | Age: 74
End: 2024-04-01
Payer: COMMERCIAL

## 2024-04-01 DIAGNOSIS — M25.50 ARTHRALGIA, UNSPECIFIED JOINT: ICD-10-CM

## 2024-04-01 DIAGNOSIS — K21.9 GASTROESOPHAGEAL REFLUX DISEASE, UNSPECIFIED WHETHER ESOPHAGITIS PRESENT: ICD-10-CM

## 2024-04-01 DIAGNOSIS — F33.1 MODERATE EPISODE OF RECURRENT MAJOR DEPRESSIVE DISORDER (HCC): Primary | ICD-10-CM

## 2024-04-01 DIAGNOSIS — I48.92 ATRIAL FLUTTER, UNSPECIFIED TYPE (HCC): ICD-10-CM

## 2024-04-01 DIAGNOSIS — E55.9 VITAMIN D DEFICIENCY: ICD-10-CM

## 2024-04-01 PROBLEM — F33.0 MILD EPISODE OF RECURRENT MAJOR DEPRESSIVE DISORDER (HCC): Status: RESOLVED | Noted: 2020-10-22 | Resolved: 2024-04-01

## 2024-04-01 PROCEDURE — G2211 COMPLEX E/M VISIT ADD ON: HCPCS | Performed by: FAMILY MEDICINE

## 2024-04-01 PROCEDURE — 1123F ACP DISCUSS/DSCN MKR DOCD: CPT | Performed by: FAMILY MEDICINE

## 2024-04-01 PROCEDURE — 3017F COLORECTAL CA SCREEN DOC REV: CPT | Performed by: FAMILY MEDICINE

## 2024-04-01 PROCEDURE — 99214 OFFICE O/P EST MOD 30 MIN: CPT | Performed by: FAMILY MEDICINE

## 2024-04-01 PROCEDURE — 1090F PRES/ABSN URINE INCON ASSESS: CPT | Performed by: FAMILY MEDICINE

## 2024-04-01 PROCEDURE — G8427 DOCREV CUR MEDS BY ELIG CLIN: HCPCS | Performed by: FAMILY MEDICINE

## 2024-04-01 PROCEDURE — G8399 PT W/DXA RESULTS DOCUMENT: HCPCS | Performed by: FAMILY MEDICINE

## 2024-04-01 SDOH — ECONOMIC STABILITY: FOOD INSECURITY: WITHIN THE PAST 12 MONTHS, THE FOOD YOU BOUGHT JUST DIDN'T LAST AND YOU DIDN'T HAVE MONEY TO GET MORE.: NEVER TRUE

## 2024-04-01 SDOH — ECONOMIC STABILITY: FOOD INSECURITY: WITHIN THE PAST 12 MONTHS, YOU WORRIED THAT YOUR FOOD WOULD RUN OUT BEFORE YOU GOT MONEY TO BUY MORE.: NEVER TRUE

## 2024-04-01 SDOH — ECONOMIC STABILITY: HOUSING INSECURITY
IN THE LAST 12 MONTHS, WAS THERE A TIME WHEN YOU DID NOT HAVE A STEADY PLACE TO SLEEP OR SLEPT IN A SHELTER (INCLUDING NOW)?: NO

## 2024-04-01 SDOH — ECONOMIC STABILITY: INCOME INSECURITY: HOW HARD IS IT FOR YOU TO PAY FOR THE VERY BASICS LIKE FOOD, HOUSING, MEDICAL CARE, AND HEATING?: NOT HARD AT ALL

## 2024-04-01 ASSESSMENT — ENCOUNTER SYMPTOMS
CONSTIPATION: 0
BLOOD IN STOOL: 0
CHEST TIGHTNESS: 0
WHEEZING: 0
SHORTNESS OF BREATH: 0
DIARRHEA: 0

## 2024-04-01 ASSESSMENT — PATIENT HEALTH QUESTIONNAIRE - PHQ9
1. LITTLE INTEREST OR PLEASURE IN DOING THINGS: NOT AT ALL
9. THOUGHTS THAT YOU WOULD BE BETTER OFF DEAD, OR OF HURTING YOURSELF: NOT AT ALL
SUM OF ALL RESPONSES TO PHQ QUESTIONS 1-9: 0
4. FEELING TIRED OR HAVING LITTLE ENERGY: NOT AT ALL
SUM OF ALL RESPONSES TO PHQ QUESTIONS 1-9: 0
8. MOVING OR SPEAKING SO SLOWLY THAT OTHER PEOPLE COULD HAVE NOTICED. OR THE OPPOSITE, BEING SO FIGETY OR RESTLESS THAT YOU HAVE BEEN MOVING AROUND A LOT MORE THAN USUAL: NOT AT ALL
5. POOR APPETITE OR OVEREATING: NOT AT ALL
3. TROUBLE FALLING OR STAYING ASLEEP: NOT AT ALL
10. IF YOU CHECKED OFF ANY PROBLEMS, HOW DIFFICULT HAVE THESE PROBLEMS MADE IT FOR YOU TO DO YOUR WORK, TAKE CARE OF THINGS AT HOME, OR GET ALONG WITH OTHER PEOPLE: NOT DIFFICULT AT ALL
2. FEELING DOWN, DEPRESSED OR HOPELESS: NOT AT ALL
6. FEELING BAD ABOUT YOURSELF - OR THAT YOU ARE A FAILURE OR HAVE LET YOURSELF OR YOUR FAMILY DOWN: NOT AT ALL
7. TROUBLE CONCENTRATING ON THINGS, SUCH AS READING THE NEWSPAPER OR WATCHING TELEVISION: NOT AT ALL
SUM OF ALL RESPONSES TO PHQ QUESTIONS 1-9: 0
SUM OF ALL RESPONSES TO PHQ QUESTIONS 1-9: 0
SUM OF ALL RESPONSES TO PHQ9 QUESTIONS 1 & 2: 0

## 2024-04-01 NOTE — PROGRESS NOTES
2024    TELEHEALTH EVALUATION -- Audio/Visual    HPI: Seen today via video visit while she was at home and I was at work.    Chief Complaint   Patient presents with    Gastroesophageal Reflux     6 month follow up    Other     Discuss blood work         Ashanti Lucia (:  1950) has requested an audio/video evaluation for the following concern(s):    GERD: she has been doing well as long as doesn't over eat.     She has been feeling pretty good. She is currently hanging out with her sister and her kids. She has not had any issues with chest pain or shortness of breath. She has been having a lot of stress recently because one of her close friends passed away. This is causing a lot of interstitial cystitis. She has also had a lot of pain in back specifically on the right side. She is not finding anything thing that makes it worse. She has not been using heat or ice. She is having a lot of generalized joint pain. She gets a lot of swelling in her fingers. She has been having problems with pain and giving way in her knees causing her to nearly fall. She has had some swelling in her legs if she sits with her feet down.     She feels like the prozac is working pretty well for her. She is not sleeping well, but that is not new for her. She is taking just one tylenol at bedtime.     Review of Systems   Constitutional:  Negative for activity change, appetite change, chills, fatigue and fever.   Eyes:  Negative for visual disturbance.   Respiratory:  Negative for cough, chest tightness, shortness of breath and wheezing.    Cardiovascular:  Negative for chest pain, palpitations and leg swelling.   Gastrointestinal:  Negative for abdominal pain, blood in stool, constipation and diarrhea.   Genitourinary:  Negative for difficulty urinating.   Neurological:  Negative for dizziness, syncope, weakness and light-headedness.   Psychiatric/Behavioral:  Positive for sleep disturbance. Negative for dysphoric mood. The

## 2024-04-16 RX ORDER — FLUOXETINE HYDROCHLORIDE 40 MG/1
40 CAPSULE ORAL DAILY
Qty: 90 CAPSULE | Refills: 0 | Status: SHIPPED | OUTPATIENT
Start: 2024-04-16

## 2024-04-16 NOTE — TELEPHONE ENCOUNTER
Ashanti called requesting a refill of the below medication which has been pended for you:   Due in October for MWV  Requested Prescriptions     Pending Prescriptions Disp Refills    FLUoxetine (PROZAC) 40 MG capsule 90 capsule 0     Sig: Take 1 capsule by mouth daily       Last Appointment Date: 4/1/2024  Next Appointment Date: Visit date not found    Allergies   Allergen Reactions    Celebrex [Celecoxib] Rash     Whole body    Codeine Other (See Comments)     Gives her a headache

## 2024-05-06 ENCOUNTER — HOSPITAL ENCOUNTER (OUTPATIENT)
Age: 74
Discharge: HOME | End: 2024-05-06
Payer: MEDICARE

## 2024-05-06 DIAGNOSIS — E55.9 VITAMIN D DEFICIENCY: ICD-10-CM

## 2024-05-06 DIAGNOSIS — M25.50 ARTHRALGIA, UNSPECIFIED JOINT: ICD-10-CM

## 2024-05-06 DIAGNOSIS — E78.2 MIXED HYPERLIPIDEMIA: ICD-10-CM

## 2024-05-06 DIAGNOSIS — I48.92 ATRIAL FLUTTER, UNSPECIFIED TYPE (HCC): ICD-10-CM

## 2024-05-06 LAB
25(OH)D3 SERPL-MCNC: 28.8 NG/ML (ref 30–100)
ALBUMIN SERPL-MCNC: 4.2 G/DL (ref 3.5–5.2)
ALBUMIN/GLOB SERPL: 1.8 {RATIO} (ref 1–2.5)
ALP SERPL-CCNC: 79 U/L (ref 35–104)
ALT SERPL-CCNC: 12 U/L (ref 5–33)
ANION GAP SERPL CALCULATED.3IONS-SCNC: 10 MMOL/L (ref 9–17)
AST SERPL-CCNC: 15 U/L
BASOPHILS # BLD: 0.06 K/UL (ref 0–0.2)
BASOPHILS NFR BLD: 1 % (ref 0–2)
BILIRUB SERPL-MCNC: 0.3 MG/DL (ref 0.3–1.2)
BUN SERPL-MCNC: 20 MG/DL (ref 8–23)
BUN/CREAT SERPL: 33 (ref 9–20)
CALCIUM SERPL-MCNC: 9.6 MG/DL (ref 8.6–10.4)
CHLORIDE SERPL-SCNC: 104 MMOL/L (ref 98–107)
CHOLEST SERPL-MCNC: 257 MG/DL (ref 0–199)
CHOLESTEROL/HDL RATIO: 5
CO2 SERPL-SCNC: 28 MMOL/L (ref 20–31)
CREAT SERPL-MCNC: 0.6 MG/DL (ref 0.5–0.9)
CRP SERPL HS-MCNC: 4.2 MG/L (ref 0–5)
EOSINOPHIL # BLD: 0.1 K/UL (ref 0–0.44)
EOSINOPHILS RELATIVE PERCENT: 1 % (ref 1–4)
ERYTHROCYTE [DISTWIDTH] IN BLOOD BY AUTOMATED COUNT: 13.7 % (ref 11.8–14.4)
ERYTHROCYTE [SEDIMENTATION RATE] IN BLOOD BY PHOTOMETRIC METHOD: 5 MM/HR (ref 0–30)
GFR, ESTIMATED: >90 ML/MIN/1.73M2
GLUCOSE SERPL-MCNC: 95 MG/DL (ref 70–99)
HCT VFR BLD AUTO: 41.4 % (ref 36.3–47.1)
HDLC SERPL-MCNC: 51 MG/DL
HGB BLD-MCNC: 13.4 G/DL (ref 11.9–15.1)
IMM GRANULOCYTES # BLD AUTO: 0.03 K/UL (ref 0–0.3)
IMM GRANULOCYTES NFR BLD: 0 %
LDLC SERPL CALC-MCNC: 161 MG/DL (ref 0–100)
LYMPHOCYTES NFR BLD: 3.02 K/UL (ref 1.1–3.7)
LYMPHOCYTES RELATIVE PERCENT: 41 % (ref 24–43)
MCH RBC QN AUTO: 30.4 PG (ref 25.2–33.5)
MCHC RBC AUTO-ENTMCNC: 32.4 G/DL (ref 25.2–33.5)
MCV RBC AUTO: 93.9 FL (ref 82.6–102.9)
MONOCYTES NFR BLD: 0.62 K/UL (ref 0.1–1.2)
MONOCYTES NFR BLD: 8 % (ref 3–12)
NEUTROPHILS NFR BLD: 49 % (ref 36–65)
NEUTS SEG NFR BLD: 3.59 K/UL (ref 1.5–8.1)
NRBC BLD-RTO: 0 PER 100 WBC
PLATELET # BLD AUTO: 194 K/UL (ref 138–453)
PMV BLD AUTO: 11.1 FL (ref 8.1–13.5)
POTASSIUM SERPL-SCNC: 4.6 MMOL/L (ref 3.7–5.3)
PROT SERPL-MCNC: 6.6 G/DL (ref 6.4–8.3)
RBC # BLD AUTO: 4.41 M/UL (ref 3.95–5.11)
RHEUMATOID FACT SER NEPH-ACNC: <10 IU/ML (ref 0–13)
SODIUM SERPL-SCNC: 142 MMOL/L (ref 135–144)
TRIGL SERPL-MCNC: 225 MG/DL
VLDLC SERPL CALC-MCNC: 45 MG/DL
WBC OTHER # BLD: 7.4 K/UL (ref 3.5–11.3)

## 2024-05-06 PROCEDURE — 86431 RHEUMATOID FACTOR QUANT: CPT

## 2024-05-06 PROCEDURE — 85025 COMPLETE CBC W/AUTO DIFF WBC: CPT

## 2024-05-06 PROCEDURE — 86140 C-REACTIVE PROTEIN: CPT

## 2024-05-06 PROCEDURE — 82306 VITAMIN D 25 HYDROXY: CPT

## 2024-05-06 PROCEDURE — 80053 COMPREHEN METABOLIC PANEL: CPT

## 2024-05-06 PROCEDURE — 80061 LIPID PANEL: CPT

## 2024-05-06 PROCEDURE — 36415 COLL VENOUS BLD VENIPUNCTURE: CPT

## 2024-05-06 PROCEDURE — 85652 RBC SED RATE AUTOMATED: CPT

## 2024-07-15 NOTE — TELEPHONE ENCOUNTER
Ashanti called requesting a refill of the below medication which has been pended for you:     Requested Prescriptions     Pending Prescriptions Disp Refills    FLUoxetine (PROZAC) 40 MG capsule [Pharmacy Med Name: FLUOXETINE 40MG CAPSULES] 90 capsule 0     Sig: TAKE 1 CAPSULE BY MOUTH DAILY       Last Appointment Date: 4/1/2024  Next Appointment Date: 10/1/2024    Allergies   Allergen Reactions    Celebrex [Celecoxib] Rash     Whole body    Codeine Other (See Comments)     Gives her a headache

## 2024-07-21 RX ORDER — FLUOXETINE HYDROCHLORIDE 40 MG/1
40 CAPSULE ORAL DAILY
Qty: 90 CAPSULE | Refills: 0 | Status: SHIPPED | OUTPATIENT
Start: 2024-07-21

## 2024-10-01 ENCOUNTER — OFFICE VISIT (OUTPATIENT)
Dept: FAMILY MEDICINE CLINIC | Age: 74
End: 2024-10-01
Payer: MEDICARE

## 2024-10-01 VITALS
OXYGEN SATURATION: 95 % | WEIGHT: 220.8 LBS | DIASTOLIC BLOOD PRESSURE: 74 MMHG | HEART RATE: 64 BPM | SYSTOLIC BLOOD PRESSURE: 126 MMHG | HEIGHT: 69 IN | BODY MASS INDEX: 32.7 KG/M2

## 2024-10-01 DIAGNOSIS — Z00.00 MEDICARE ANNUAL WELLNESS VISIT, SUBSEQUENT: Primary | ICD-10-CM

## 2024-10-01 DIAGNOSIS — F33.1 MODERATE EPISODE OF RECURRENT MAJOR DEPRESSIVE DISORDER (HCC): ICD-10-CM

## 2024-10-01 DIAGNOSIS — J45.40 MODERATE PERSISTENT ASTHMA WITHOUT COMPLICATION: ICD-10-CM

## 2024-10-01 DIAGNOSIS — G43.701 CHRONIC MIGRAINE WITHOUT AURA WITH STATUS MIGRAINOSUS, NOT INTRACTABLE: ICD-10-CM

## 2024-10-01 PROBLEM — Z47.1 AFTERCARE FOLLOWING JOINT REPLACEMENT SURGERY: Status: RESOLVED | Noted: 2019-11-12 | Resolved: 2024-10-01

## 2024-10-01 PROCEDURE — 1123F ACP DISCUSS/DSCN MKR DOCD: CPT | Performed by: FAMILY MEDICINE

## 2024-10-01 PROCEDURE — 99213 OFFICE O/P EST LOW 20 MIN: CPT | Performed by: FAMILY MEDICINE

## 2024-10-01 PROCEDURE — G8417 CALC BMI ABV UP PARAM F/U: HCPCS | Performed by: FAMILY MEDICINE

## 2024-10-01 PROCEDURE — G8427 DOCREV CUR MEDS BY ELIG CLIN: HCPCS | Performed by: FAMILY MEDICINE

## 2024-10-01 PROCEDURE — 1036F TOBACCO NON-USER: CPT | Performed by: FAMILY MEDICINE

## 2024-10-01 PROCEDURE — 3017F COLORECTAL CA SCREEN DOC REV: CPT | Performed by: FAMILY MEDICINE

## 2024-10-01 PROCEDURE — G8484 FLU IMMUNIZE NO ADMIN: HCPCS | Performed by: FAMILY MEDICINE

## 2024-10-01 PROCEDURE — 1090F PRES/ABSN URINE INCON ASSESS: CPT | Performed by: FAMILY MEDICINE

## 2024-10-01 PROCEDURE — G8399 PT W/DXA RESULTS DOCUMENT: HCPCS | Performed by: FAMILY MEDICINE

## 2024-10-01 PROCEDURE — G0439 PPPS, SUBSEQ VISIT: HCPCS | Performed by: FAMILY MEDICINE

## 2024-10-01 RX ORDER — FLUOCINONIDE TOPICAL SOLUTION USP, 0.05% 0.5 MG/ML
SOLUTION TOPICAL
COMMUNITY
Start: 2024-07-17 | End: 2025-07-17

## 2024-10-01 ASSESSMENT — LIFESTYLE VARIABLES
HOW OFTEN DO YOU HAVE A DRINK CONTAINING ALCOHOL: MONTHLY OR LESS
HOW MANY STANDARD DRINKS CONTAINING ALCOHOL DO YOU HAVE ON A TYPICAL DAY: 1 OR 2

## 2024-10-01 ASSESSMENT — PATIENT HEALTH QUESTIONNAIRE - PHQ9
SUM OF ALL RESPONSES TO PHQ9 QUESTIONS 1 & 2: 0
SUM OF ALL RESPONSES TO PHQ QUESTIONS 1-9: 0
5. POOR APPETITE OR OVEREATING: NOT AT ALL
SUM OF ALL RESPONSES TO PHQ QUESTIONS 1-9: 0
9. THOUGHTS THAT YOU WOULD BE BETTER OFF DEAD, OR OF HURTING YOURSELF: NOT AT ALL
1. LITTLE INTEREST OR PLEASURE IN DOING THINGS: NOT AT ALL
3. TROUBLE FALLING OR STAYING ASLEEP: NOT AT ALL
8. MOVING OR SPEAKING SO SLOWLY THAT OTHER PEOPLE COULD HAVE NOTICED. OR THE OPPOSITE, BEING SO FIGETY OR RESTLESS THAT YOU HAVE BEEN MOVING AROUND A LOT MORE THAN USUAL: NOT AT ALL
10. IF YOU CHECKED OFF ANY PROBLEMS, HOW DIFFICULT HAVE THESE PROBLEMS MADE IT FOR YOU TO DO YOUR WORK, TAKE CARE OF THINGS AT HOME, OR GET ALONG WITH OTHER PEOPLE: NOT DIFFICULT AT ALL
6. FEELING BAD ABOUT YOURSELF - OR THAT YOU ARE A FAILURE OR HAVE LET YOURSELF OR YOUR FAMILY DOWN: NOT AT ALL
7. TROUBLE CONCENTRATING ON THINGS, SUCH AS READING THE NEWSPAPER OR WATCHING TELEVISION: NOT AT ALL
4. FEELING TIRED OR HAVING LITTLE ENERGY: NOT AT ALL
2. FEELING DOWN, DEPRESSED OR HOPELESS: NOT AT ALL

## 2024-10-01 ASSESSMENT — ENCOUNTER SYMPTOMS
COUGH: 0
CONSTIPATION: 0
SHORTNESS OF BREATH: 0
NAUSEA: 0
DIARRHEA: 1
CHEST TIGHTNESS: 0
WHEEZING: 0
ABDOMINAL PAIN: 0

## 2024-10-01 NOTE — PROGRESS NOTES
Medicare Annual Wellness Visit    Ashanti Lucia is here for Medicare AWV    Assessment & Plan   Medicare annual wellness visit, subsequent  Moderate episode of recurrent major depressive disorder (HCC)  Chronic migraine without aura with status migrainosus, not intractable  Moderate persistent asthma without complication    Recommendations for Preventive Services Due: see orders and patient instructions/AVS.  Recommended screening schedule for the next 5-10 years is provided to the patient in written form: see Patient Instructions/AVS.     Return in about 6 months (around 4/1/2025) for Depression follow up, Anxiety follow up.     Subjective   Depression/ anxiety    Patient's complete Health Risk Assessment and screening values have been reviewed and are found in Flowsheets. The following problems were reviewed today and where indicated follow up appointments were made and/or referrals ordered.    Positive Risk Factor Screenings with Interventions:               General HRA Questions:  Select all that apply: (!) Stress  Interventions - Stress:  Advised to start using hydroxizine more.      Inactivity:  On average, how many days per week do you engage in moderate to strenuous exercise (like a brisk walk)?: 0 days (!) Abnormal  On average, how many minutes do you engage in exercise at this level?: 0 min  Interventions:  Patient declined any further interventions or treatment    Poor Eating Habits/Diet:  Do you eat balanced/healthy meals regularly?: (!) No  Interventions:  low carbohydrate diet    Abnormal BMI (obese):  Body mass index is 33.08 kg/m². (!) Abnormal  Interventions:  low carbohydrate diet       Hearing Screen:  Do you or your family notice any trouble with your hearing that hasn't been managed with hearing aids?: (!) Yes    Interventions:  Patient declines any further evaluation or treatment    Vision Screen:  Do you have difficulty driving, watching TV, or doing any of your daily activities because

## 2024-10-01 NOTE — PROGRESS NOTES
[facility]  [address]  [facility] [address]  Dept: [phone]  Dept Fax: [phone]  Loc: [phone]    Ashanti Lucia is a 74 y.o. female who presents today for her medical conditions/complaints as noted below.  Ashanti Lucia is c/o of   Chief Complaint   Patient presents with    Medicare AWV       HPI:     HPI Here today for her MWV and a follow up of her depression.     One of her best friend's   so she has been helping her and it is taking a lot out of her because this friend is very upset and desperately in need of therapy herself. She is feeling some anxiety and stress over her friend's situation. She has hydroxizine at home for her IC and that helps her anxiety but she has not been taking it. She is not sleeping well. She can't get her brain to stop cycling.     No issues with chest pain or shortness of breath. She is not getting much exercise. She just doesn't want to move much. She tries to walk at the stores. She has continued to have off and on diarrhea. Yesterday was a bad day. She feels like it might be due to her magnesium. If she takes magnesium every other day she doesn't have as much diarrhea. She typically lets the diarrhea run its course. She has not had any blood in the stool. She has continued to have a lot of headaches. She had a bad migraine a few days ago that lasted all day. She rarely takes tylenol for it. She has had some eye dryness.    Past Medical History:   Diagnosis Date    Allergic rhinitis     Anxiety     Asthma     Bronchitis     Chest pain     Chronic back pain     Depression     Fibromyalgia     GERD (gastroesophageal reflux disease)     Headache(784.0)     Heart palpitations     Irregular bowel habits     Macular degeneration     beginning    Measles     Osteoarthritis     Restless legs syndrome     Tachycardia     Unspecified sleep apnea     Wet senile

## 2024-10-01 NOTE — PATIENT INSTRUCTIONS
that time. Try being active before you take your medicine. This will reduce your risk of falls.  If you plan to be active at home, make sure to clear your space before you get started. Remove things like TV cords, coffee tables, and throw rugs. It's safest to have plenty of space to move freely.  The key to getting more active is to take it slow and steady. Try to improve only a little bit at a time. Pick just one area to improve on at first. And if an activity hurts, stop and talk to your doctor.  Where can you learn more?  Go to https://www.Sendmebox.net/patientEd and enter P600 to learn more about \"Learning About Being Active as an Older Adult.\"  Current as of: June 5, 2023  Content Version: 14.1  © 1997-4891 [employer].   Care instructions adapted under license by [facility] [facility]. If you have questions about a medical condition or this instruction, always ask your healthcare professional. [employer] disclaims any warranty or liability for your use of this information.           Hearing Loss: Care Instructions  Overview     Hearing loss is a sudden or slow decrease in how well you hear. It can range from slight to profound. Permanent hearing loss can occur with aging. It also can happen when you are exposed long-term to loud noise. Examples include listening to loud music, riding motorcycles, or being around other loud machines.  Hearing loss can affect your work and home life. It can make you feel lonely or depressed. You may feel that you have lost your independence. But hearing aids and other devices can help you hear better and feel connected to others.  Follow-up care is a key part of your treatment and safety. Be sure to make and go to all appointments, and call your doctor if you are having problems. It's also a good idea to know your test results and keep a list of the medicines you take.  How can you care for yourself at home?  Avoid loud noises whenever possible. This helps

## 2024-10-21 RX ORDER — FLUOXETINE 40 MG/1
40 CAPSULE ORAL DAILY
Qty: 90 CAPSULE | Refills: 0 | Status: SHIPPED | OUTPATIENT
Start: 2024-10-21

## 2024-10-21 NOTE — TELEPHONE ENCOUNTER
Ashanti called requesting a refill of the below medication which has been pended for you:     Requested Prescriptions     Pending Prescriptions Disp Refills    FLUoxetine (PROZAC) 40 MG capsule [Pharmacy Med Name: FLUOXETINE 40MG CAPSULES] 90 capsule 0     Sig: TAKE 1 CAPSULE BY MOUTH DAILY       Last Appointment Date: 10/1/2024  Next Appointment Date: 4/1/2025    Allergies   Allergen Reactions    Celebrex [Celecoxib] Rash     Whole body    Codeine Other (See Comments)     Gives her a headache

## 2025-01-20 RX ORDER — FLUOXETINE 40 MG/1
40 CAPSULE ORAL DAILY
Qty: 90 CAPSULE | Refills: 0 | Status: SHIPPED | OUTPATIENT
Start: 2025-01-20

## 2025-01-27 RX ORDER — FLUOXETINE 40 MG/1
40 CAPSULE ORAL DAILY
Qty: 90 CAPSULE | Refills: 0 | OUTPATIENT
Start: 2025-01-27

## 2025-02-09 ENCOUNTER — OFFICE VISIT (OUTPATIENT)
Dept: PRIMARY CARE CLINIC | Age: 75
End: 2025-02-09

## 2025-02-09 VITALS
TEMPERATURE: 98.9 F | BODY MASS INDEX: 33.26 KG/M2 | SYSTOLIC BLOOD PRESSURE: 126 MMHG | OXYGEN SATURATION: 96 % | RESPIRATION RATE: 20 BRPM | HEART RATE: 61 BPM | DIASTOLIC BLOOD PRESSURE: 72 MMHG | WEIGHT: 222 LBS

## 2025-02-09 DIAGNOSIS — B02.9 HERPES ZOSTER WITHOUT COMPLICATION: Primary | ICD-10-CM

## 2025-02-09 RX ORDER — VALACYCLOVIR HYDROCHLORIDE 1 G/1
1000 TABLET, FILM COATED ORAL 3 TIMES DAILY
Qty: 21 TABLET | Refills: 0 | Status: SHIPPED | OUTPATIENT
Start: 2025-02-09

## 2025-03-18 ENCOUNTER — HOSPITAL ENCOUNTER (OUTPATIENT)
Age: 75
Discharge: HOME | End: 2025-03-18
Payer: MEDICARE

## 2025-03-18 ENCOUNTER — OFFICE VISIT (OUTPATIENT)
Dept: OBGYN | Age: 75
End: 2025-03-18
Payer: MEDICARE

## 2025-03-18 VITALS
BODY MASS INDEX: 32.38 KG/M2 | WEIGHT: 218.6 LBS | RESPIRATION RATE: 16 BRPM | DIASTOLIC BLOOD PRESSURE: 64 MMHG | HEART RATE: 61 BPM | HEIGHT: 69 IN | SYSTOLIC BLOOD PRESSURE: 110 MMHG

## 2025-03-18 DIAGNOSIS — N39.490 OVERFLOW INCONTINENCE: ICD-10-CM

## 2025-03-18 DIAGNOSIS — N81.4 CYSTOCELE WITH UTERINE PROLAPSE: Primary | ICD-10-CM

## 2025-03-18 DIAGNOSIS — Z12.31 ENCOUNTER FOR SCREENING MAMMOGRAM FOR MALIGNANT NEOPLASM OF BREAST: ICD-10-CM

## 2025-03-18 DIAGNOSIS — N81.89 PELVIC RELAXATION: ICD-10-CM

## 2025-03-18 DIAGNOSIS — E28.39 OVARIAN FAILURE DUE TO MENOPAUSE: ICD-10-CM

## 2025-03-18 DIAGNOSIS — N81.89 LOSS OF PERINEAL BODY, FEMALE: ICD-10-CM

## 2025-03-18 LAB
BILIRUB UR QL STRIP: NEGATIVE
CLARITY UR: CLEAR
COLOR UR: YELLOW
COMMENT: NORMAL
GLUCOSE UR STRIP-MCNC: NEGATIVE MG/DL
HGB UR QL STRIP.AUTO: NEGATIVE
KETONES UR STRIP-MCNC: NEGATIVE MG/DL
LEUKOCYTE ESTERASE UR QL STRIP: NEGATIVE
NITRITE UR QL STRIP: NEGATIVE
PH UR STRIP: 6 [PH] (ref 5–6)
PROT UR STRIP-MCNC: NEGATIVE MG/DL
SP GR UR STRIP: 1.01 (ref 1.01–1.02)
UROBILINOGEN UR STRIP-ACNC: NORMAL EU/DL (ref 0–1)

## 2025-03-18 PROCEDURE — G8417 CALC BMI ABV UP PARAM F/U: HCPCS | Performed by: OBSTETRICS & GYNECOLOGY

## 2025-03-18 PROCEDURE — 1036F TOBACCO NON-USER: CPT | Performed by: OBSTETRICS & GYNECOLOGY

## 2025-03-18 PROCEDURE — 1090F PRES/ABSN URINE INCON ASSESS: CPT | Performed by: OBSTETRICS & GYNECOLOGY

## 2025-03-18 PROCEDURE — G8427 DOCREV CUR MEDS BY ELIG CLIN: HCPCS | Performed by: OBSTETRICS & GYNECOLOGY

## 2025-03-18 PROCEDURE — 3017F COLORECTAL CA SCREEN DOC REV: CPT | Performed by: OBSTETRICS & GYNECOLOGY

## 2025-03-18 PROCEDURE — 81003 URINALYSIS AUTO W/O SCOPE: CPT

## 2025-03-18 PROCEDURE — 0509F URINE INCON PLAN DOCD: CPT | Performed by: OBSTETRICS & GYNECOLOGY

## 2025-03-18 PROCEDURE — 1123F ACP DISCUSS/DSCN MKR DOCD: CPT | Performed by: OBSTETRICS & GYNECOLOGY

## 2025-03-18 PROCEDURE — 1159F MED LIST DOCD IN RCRD: CPT | Performed by: OBSTETRICS & GYNECOLOGY

## 2025-03-18 PROCEDURE — 99204 OFFICE O/P NEW MOD 45 MIN: CPT | Performed by: OBSTETRICS & GYNECOLOGY

## 2025-03-18 PROCEDURE — G8399 PT W/DXA RESULTS DOCUMENT: HCPCS | Performed by: OBSTETRICS & GYNECOLOGY

## 2025-03-18 PROCEDURE — 87086 URINE CULTURE/COLONY COUNT: CPT

## 2025-03-18 RX ORDER — CHOLECALCIFEROL (VITD3)/VIT K2 25-90 MCG
1 TABLET,DISINTEGRATING ORAL DAILY
COMMUNITY

## 2025-03-18 NOTE — PROGRESS NOTES
Date:   5/18/2026     Reason for exam::   screen    DEXA AXIAL SKELETON W VERTEBRAL FX ASST     Standing Status:   Future     Expected Date:   3/18/2025     Expiration Date:   3/18/2026     Reason for exam::   screen    Urinalysis with Reflex to Culture     Standing Status:   Future     Expected Date:   3/18/2025     Expiration Date:   5/18/2025     SPECIFY(EX-CATH,MIDSTREAM,CYSTO,ETC)?:   MID           The patient, Ashanti Lucia is a 74 y.o. female, was seen with a total time spent of 45 minutes for the visit on this date of service by the E/M provider. The time component had both face to face and non face to face time spent in determining the total time component.  Counseling and education regarding her diagnosis listed below and her options regarding those diagnoses were also included in determining her time component.      Diagnosis Orders   1. 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 Pelvic relaxation        3. Encounter for screening mammogram for malignant neoplasm of breast  JONATHAN DIGITAL SCREEN W OR WO CAD BILATERAL      4. Ovarian failure due to menopause  DEXA AXIAL SKELETON W VERTEBRAL FX ASST      5. Loss of perineal body, female        6. Overflow incontinence  Urinalysis with Reflex to Culture    Culture, Urine           The patient had her preventative health maintenance recommendations and follow-up reviewed with her at the completion of her visit.

## 2025-03-19 ENCOUNTER — TELEPHONE (OUTPATIENT)
Dept: FAMILY MEDICINE CLINIC | Age: 75
End: 2025-03-19

## 2025-03-19 LAB
MICROORGANISM SPEC CULT: NO GROWTH
SERVICE CMNT-IMP: NORMAL
SPECIMEN DESCRIPTION: NORMAL

## 2025-03-19 NOTE — TELEPHONE ENCOUNTER
----- Message from Jim NOMI sent at 3/19/2025  8:56 AM EDT -----  Regarding: ECC Appointment Request  ECC Appointment Request    Patient needs appointment for ECC Appointment Type: Existing Condition Follow Up.    Patient Requested Dates(s): April 17  Patient Requested Time: preferably 11 am  Provider Name:Lilian hCen MD    Reason for Appointment Request: Established Patient - Available appointments did not meet patient need  --------------------------------------------------------------------------------------------------------------------------\ patient wants to schedule her appointment on April 17 , same with Dr. Beard ,     Relationship to Patient: Self     Call Back Information: OK to leave message on voicemail  Preferred Call Back Number: [phone] (home)

## 2025-04-17 ENCOUNTER — HOSPITAL ENCOUNTER (OUTPATIENT)
Dept: MAMMOGRAPHY | Age: 75
Discharge: HOME | End: 2025-04-19
Attending: OBSTETRICS & GYNECOLOGY
Payer: MEDICARE

## 2025-04-17 ENCOUNTER — RESULTS FOLLOW-UP (OUTPATIENT)
Dept: OBGYN CLINIC | Age: 75
End: 2025-04-17

## 2025-04-17 ENCOUNTER — OFFICE VISIT (OUTPATIENT)
Dept: FAMILY MEDICINE CLINIC | Age: 75
End: 2025-04-17

## 2025-04-17 ENCOUNTER — PROCEDURE VISIT (OUTPATIENT)
Dept: OBGYN | Age: 75
End: 2025-04-17
Payer: MEDICARE

## 2025-04-17 ENCOUNTER — HOSPITAL ENCOUNTER (OUTPATIENT)
Dept: BONE DENSITY | Age: 75
Discharge: HOME | End: 2025-04-19
Attending: OBSTETRICS & GYNECOLOGY
Payer: MEDICARE

## 2025-04-17 VITALS
DIASTOLIC BLOOD PRESSURE: 66 MMHG | SYSTOLIC BLOOD PRESSURE: 112 MMHG | BODY MASS INDEX: 32.29 KG/M2 | HEIGHT: 69 IN | WEIGHT: 218 LBS | HEART RATE: 60 BPM | RESPIRATION RATE: 16 BRPM

## 2025-04-17 VITALS
BODY MASS INDEX: 32.15 KG/M2 | HEIGHT: 69 IN | SYSTOLIC BLOOD PRESSURE: 112 MMHG | HEART RATE: 60 BPM | WEIGHT: 217.1 LBS | DIASTOLIC BLOOD PRESSURE: 66 MMHG | OXYGEN SATURATION: 97 %

## 2025-04-17 DIAGNOSIS — F41.1 ANXIETY STATE: ICD-10-CM

## 2025-04-17 DIAGNOSIS — N81.89 PELVIC RELAXATION: ICD-10-CM

## 2025-04-17 DIAGNOSIS — N81.89 LOSS OF PERINEAL BODY, FEMALE: ICD-10-CM

## 2025-04-17 DIAGNOSIS — F33.1 MODERATE EPISODE OF RECURRENT MAJOR DEPRESSIVE DISORDER (HCC): Primary | ICD-10-CM

## 2025-04-17 DIAGNOSIS — E28.39 OVARIAN FAILURE DUE TO MENOPAUSE: ICD-10-CM

## 2025-04-17 DIAGNOSIS — Z96.0 PRESENCE OF PESSARY: ICD-10-CM

## 2025-04-17 DIAGNOSIS — N81.4 CYSTOCELE WITH UTERINE PROLAPSE: ICD-10-CM

## 2025-04-17 DIAGNOSIS — Z46.89 ENCOUNTER FOR FITTING AND ADJUSTMENT OF PESSARY: Primary | ICD-10-CM

## 2025-04-17 DIAGNOSIS — I48.92 ATRIAL FLUTTER, UNSPECIFIED TYPE (HCC): ICD-10-CM

## 2025-04-17 DIAGNOSIS — N39.490 OVERFLOW INCONTINENCE: ICD-10-CM

## 2025-04-17 DIAGNOSIS — F51.01 PRIMARY INSOMNIA: ICD-10-CM

## 2025-04-17 DIAGNOSIS — J45.40 MODERATE PERSISTENT ASTHMA WITHOUT COMPLICATION: ICD-10-CM

## 2025-04-17 DIAGNOSIS — Z12.31 ENCOUNTER FOR SCREENING MAMMOGRAM FOR MALIGNANT NEOPLASM OF BREAST: ICD-10-CM

## 2025-04-17 PROCEDURE — 77080 DXA BONE DENSITY AXIAL: CPT

## 2025-04-17 PROCEDURE — 57160 INSERT PESSARY/OTHER DEVICE: CPT | Performed by: OBSTETRICS & GYNECOLOGY

## 2025-04-17 PROCEDURE — 77063 BREAST TOMOSYNTHESIS BI: CPT

## 2025-04-17 PROCEDURE — 77085 DXA BONE DENSITY AXL VRT FX: CPT

## 2025-04-17 RX ORDER — FLUOXETINE HYDROCHLORIDE 40 MG/1
40 CAPSULE ORAL DAILY
Qty: 90 CAPSULE | Refills: 1 | Status: SHIPPED | OUTPATIENT
Start: 2025-04-17

## 2025-04-17 RX ORDER — TRIAMCINOLONE ACETONIDE 0.25 MG/G
CREAM TOPICAL
Qty: 80 G | Refills: 1 | Status: SHIPPED | OUTPATIENT
Start: 2025-04-17

## 2025-04-17 RX ORDER — ALBUTEROL SULFATE 0.83 MG/ML
2.5 SOLUTION RESPIRATORY (INHALATION) EVERY 4 HOURS PRN
Qty: 125 ML | Refills: 1 | Status: SHIPPED | OUTPATIENT
Start: 2025-04-17

## 2025-04-17 RX ORDER — HYDROXYZINE HYDROCHLORIDE 25 MG/1
25 TABLET, FILM COATED ORAL NIGHTLY PRN
Qty: 30 TABLET | Refills: 3 | Status: SHIPPED | OUTPATIENT
Start: 2025-04-17

## 2025-04-17 RX ORDER — FLUOXETINE HYDROCHLORIDE 40 MG/1
40 CAPSULE ORAL DAILY
Qty: 90 CAPSULE | Refills: 0 | Status: SHIPPED | OUTPATIENT
Start: 2025-04-17 | End: 2025-04-17 | Stop reason: SDUPTHER

## 2025-04-17 SDOH — ECONOMIC STABILITY: FOOD INSECURITY: WITHIN THE PAST 12 MONTHS, THE FOOD YOU BOUGHT JUST DIDN'T LAST AND YOU DIDN'T HAVE MONEY TO GET MORE.: NEVER TRUE

## 2025-04-17 SDOH — ECONOMIC STABILITY: FOOD INSECURITY: WITHIN THE PAST 12 MONTHS, YOU WORRIED THAT YOUR FOOD WOULD RUN OUT BEFORE YOU GOT MONEY TO BUY MORE.: NEVER TRUE

## 2025-04-17 ASSESSMENT — ENCOUNTER SYMPTOMS
DIARRHEA: 1
CHEST TIGHTNESS: 0
WHEEZING: 0
COUGH: 1
SHORTNESS OF BREATH: 1

## 2025-04-17 ASSESSMENT — PATIENT HEALTH QUESTIONNAIRE - PHQ9
7. TROUBLE CONCENTRATING ON THINGS, SUCH AS READING THE NEWSPAPER OR WATCHING TELEVISION: NOT AT ALL
3. TROUBLE FALLING OR STAYING ASLEEP: NOT AT ALL
5. POOR APPETITE OR OVEREATING: NOT AT ALL
2. FEELING DOWN, DEPRESSED OR HOPELESS: NOT AT ALL
SUM OF ALL RESPONSES TO PHQ QUESTIONS 1-9: 0
4. FEELING TIRED OR HAVING LITTLE ENERGY: NOT AT ALL
10. IF YOU CHECKED OFF ANY PROBLEMS, HOW DIFFICULT HAVE THESE PROBLEMS MADE IT FOR YOU TO DO YOUR WORK, TAKE CARE OF THINGS AT HOME, OR GET ALONG WITH OTHER PEOPLE: NOT DIFFICULT AT ALL
6. FEELING BAD ABOUT YOURSELF - OR THAT YOU ARE A FAILURE OR HAVE LET YOURSELF OR YOUR FAMILY DOWN: NOT AT ALL
1. LITTLE INTEREST OR PLEASURE IN DOING THINGS: NOT AT ALL
8. MOVING OR SPEAKING SO SLOWLY THAT OTHER PEOPLE COULD HAVE NOTICED. OR THE OPPOSITE, BEING SO FIGETY OR RESTLESS THAT YOU HAVE BEEN MOVING AROUND A LOT MORE THAN USUAL: NOT AT ALL
9. THOUGHTS THAT YOU WOULD BE BETTER OFF DEAD, OR OF HURTING YOURSELF: NOT AT ALL
SUM OF ALL RESPONSES TO PHQ QUESTIONS 1-9: 0

## 2025-04-17 NOTE — PROGRESS NOTES
[facility] A DEPARTMENT OF [facility]  [address] [facility] [address]  Dept: [phone]  Dept Fax: [phone]  Loc: [phone]    Ashanti Lucia is a 74 y.o. female who presents today for her medical conditions/complaints as noted below.  Ashanti Lucia is c/o of   Chief Complaint   Patient presents with    Anxiety     6 months    Depression     6 months       HPI:     History of Present Illness  The patient is a 74-year-old female who presents today for a follow-up of her depression and anxiety.    Significant stress is reported due to her 's frequent hospitalizations, leading to feelings of exhaustion. No panic attacks have occurred, but a general sense of unease is described. She continues her Prozac regimen and uses hydroxyzine sparingly. An increase in Prozac dosage is not deemed necessary at this time. Poor sleep quality is attributed to the recent acquisition of a new, firmer mattress, causing hip pain and necessitating frequent position changes during sleep. A few episodes of palpitations are noted, potentially linked to anxiety.    Recovery from a sinus infection is ongoing, with an associated cough that has since improved. No shortness of breath or wheezing is reported.    Occasional chest pain is experienced, suspected to be related to a hiatal hernia. The pain does not worsen with physical activity but intensifies after eating. Portion sizes have been reduced to manage this. No medication for acid reflux is currently taken, but [place] chews are used, and a switch to papaya is being considered. Additional medications are avoided.    Frequent diarrhea is reported, which is not a new symptom.    A DEXA scan and mammogram are scheduled for today, along with the fitting of a pessary.    Outdated albuterol has been used in her nebulizer, and a refill is requested.    A sensation akin to an electric shock

## 2025-04-17 NOTE — PROGRESS NOTES
Ashanti Lucia  2025  1:34 PM    PESSARY FITTING APPOINTMENT      Ashanti Lucia is a 74 y.o. female     1. Encounter for fitting and adjustment of pessary    2. Cystocele 3-4 with valsalva with uterine prolapse grade 2    3. Pelvic relaxation    4. Ovarian failure due to menopause    5. Loss of perineal body, female    6. Overflow incontinence    7. Presence of pessary #5 Gehrung            The patient was seen.She is being fitted for her pessary today for the above dx. She was fitted for a Gehrung 5 type pessary.  She states all of her symptoms that she had prior to the pessary have been relieved with its use.  She denies any vaginal bleeding.  She denies to any vaginal discharge or odor. Her bowels are regular and her voiding pattern is normal.     Blood pressure 112/66, pulse 60, resp. rate 16, height 1.74 m (5' 8.5\"), weight 98.9 kg (218 lb), not currently breastfeeding.    Chaperone for Intimate Exam  Chaperone was offered and accepted as part of the rooming process.  Chaperone: Teresa        Abdomen: Soft and non-tender; good bowel sounds; no guarding, rebound or rigidity; no CVA tenderness bilaterally.    Extremities: No calf tenderness bilaterally. DTR 2/4 bilaterally. No edema.    Perineum/Speculum: There is not any signs of infection; The vaginal vault is without any signs of erythema or erosion. There is no vaginal discharge or odor appreciated.        Assessment:   Diagnosis Orders   1. Encounter for fitting and adjustment of pessary  FIT/INSERT INTRAVAG SUPPORT DEVICE    AK PESSARY REUSABLE RUB ANYTYPE      2. Cystocele 3-4 with valsalva with uterine prolapse grade 2  FIT/INSERT INTRAVAG SUPPORT DEVICE    AK PESSARY REUSABLE RUB ANYTYPE      3. Pelvic relaxation        4. Ovarian failure due to menopause        5. Loss of perineal body, female        6. Overflow incontinence        7. Presence of pessary #5 Gehrung          Chief Complaint   Patient presents with    Other

## 2025-04-21 ENCOUNTER — TELEPHONE (OUTPATIENT)
Dept: OBGYN | Age: 75
End: 2025-04-21

## 2025-04-21 ENCOUNTER — RESULTS FOLLOW-UP (OUTPATIENT)
Dept: OBGYN CLINIC | Age: 75
End: 2025-04-21

## 2025-04-21 NOTE — TELEPHONE ENCOUNTER
Pt had pessary placed on 4/17 and due to bleeding and discomfort she removed it later that night. Pt is wondering if she should have a smaller pessary.  stated he has some at one of his other offices and could bring it in for her if necessary. Please discuss with doctor and then inform pt.

## 2025-04-21 NOTE — TELEPHONE ENCOUNTER
Patient called office again regarding pessary. Patient wanted to be scheduled with Dr. Beard for a smaller pessary placement. Writer scheduled appointment for patient with Dr. Beard for Thursday 04-24-25 at 9:30 am.  Patient states that Dr. Beard told her he will have to bring one from his [place] office.

## 2025-04-24 ENCOUNTER — PROCEDURE VISIT (OUTPATIENT)
Dept: OBGYN | Age: 75
End: 2025-04-24

## 2025-04-24 VITALS
HEIGHT: 69 IN | HEART RATE: 57 BPM | SYSTOLIC BLOOD PRESSURE: 110 MMHG | RESPIRATION RATE: 18 BRPM | BODY MASS INDEX: 31.84 KG/M2 | DIASTOLIC BLOOD PRESSURE: 64 MMHG | OXYGEN SATURATION: 99 % | WEIGHT: 215 LBS

## 2025-04-24 DIAGNOSIS — N81.89 LOSS OF PERINEAL BODY, FEMALE: ICD-10-CM

## 2025-04-24 DIAGNOSIS — N39.490 OVERFLOW INCONTINENCE: ICD-10-CM

## 2025-04-24 DIAGNOSIS — N81.89 PELVIC RELAXATION: ICD-10-CM

## 2025-04-24 DIAGNOSIS — N81.4 CYSTOCELE WITH UTERINE PROLAPSE: ICD-10-CM

## 2025-04-24 DIAGNOSIS — Z46.89 ENCOUNTER FOR FITTING AND ADJUSTMENT OF PESSARY: Primary | ICD-10-CM

## 2025-04-24 NOTE — PROGRESS NOTES
Patient present for appointment today, but pessary needed was out of stock. Ordered previously and have not come in. Patient rescheduled.   
Mole Changes or Cancer                                                        Blood pressure 110/64, pulse 57, resp. rate 18, height 1.74 m (5' 8.5\"), weight 97.5 kg (215 lb), SpO2 99%, not currently breastfeeding.        Abdomen: Soft non-tender; good bowel sounds. No guarding, rebound or rigidity. No CVA tenderness bilaterally.    Extremities: No calf tenderness, DTR 2/4, and No edema bilaterally    Pelvic: Declined       Diagnostics:  na      Lab Results:  Results for orders placed or performed during the hospital encounter of 03/18/25   Culture, Urine    Specimen: Urine, clean catch   Result Value Ref Range    Specimen Description .CLEAN CATCH URINE     Special Requests Site: Urine     Culture NO GROWTH    Urinalysis with Reflex to Culture    Specimen: Urine, clean catch   Result Value Ref Range    Color, UA Yellow Yellow    Turbidity UA Clear Clear    Glucose, Ur NEGATIVE NEGATIVE mg/dL    Bilirubin, Urine NEGATIVE NEGATIVE    Ketones, Urine NEGATIVE NEGATIVE mg/dL    Specific Gravity, UA 1.010 1.010 - 1.025    Urine Hgb NEGATIVE NEGATIVE    pH, Urine 6.0 5.0 - 6.0    Protein, UA NEGATIVE NEGATIVE mg/dL    Urobilinogen, Urine Normal 0.0 - 1.0 EU/dL    Nitrite, Urine NEGATIVE NEGATIVE    Leukocyte Esterase, Urine NEGATIVE NEGATIVE    Comment       Microscopic exam not performed based on chemical results unless requested in original order.    Comment          Comment       Utilizing a urinalysis as the only screening method to exclude a potential uropathogen can be unreliable in many patient populations.  Rapid screening tests are less sensitive than culture and if UTI is a clinical possibility, culture should be considered despite a negative urinalysis.             Assessment:   Diagnosis Orders   1. Encounter for fitting and adjustment of pessary        2. Cystocele 3-4 with valsalva with uterine prolapse grade 2        3. Pelvic relaxation        4. Loss of perineal body, female        5. Overflow incontinence

## 2025-04-24 NOTE — TELEPHONE ENCOUNTER
Patient has appointment scheduled for today. We do not have a smaller hrung pessary in this office at this time and I don't see where it states sizes to bring if necessary. Will speak to Dr. Beard on this matter.

## 2025-05-08 ENCOUNTER — HOSPITAL ENCOUNTER (EMERGENCY)
Age: 75
Discharge: HOME | End: 2025-05-08
Attending: EMERGENCY MEDICINE
Payer: MEDICARE

## 2025-05-08 ENCOUNTER — PROCEDURE VISIT (OUTPATIENT)
Dept: OBGYN | Age: 75
End: 2025-05-08
Payer: MEDICARE

## 2025-05-08 VITALS
HEIGHT: 68 IN | SYSTOLIC BLOOD PRESSURE: 149 MMHG | TEMPERATURE: 97.8 F | RESPIRATION RATE: 18 BRPM | OXYGEN SATURATION: 95 % | BODY MASS INDEX: 32.58 KG/M2 | HEART RATE: 65 BPM | WEIGHT: 215 LBS | DIASTOLIC BLOOD PRESSURE: 71 MMHG

## 2025-05-08 VITALS
HEART RATE: 62 BPM | HEIGHT: 69 IN | WEIGHT: 215 LBS | DIASTOLIC BLOOD PRESSURE: 86 MMHG | RESPIRATION RATE: 16 BRPM | SYSTOLIC BLOOD PRESSURE: 128 MMHG | BODY MASS INDEX: 31.84 KG/M2

## 2025-05-08 DIAGNOSIS — N39.490 OVERFLOW INCONTINENCE: ICD-10-CM

## 2025-05-08 DIAGNOSIS — Z46.89 ENCOUNTER FOR FITTING AND ADJUSTMENT OF PESSARY: Primary | ICD-10-CM

## 2025-05-08 DIAGNOSIS — N81.89 PELVIC RELAXATION: ICD-10-CM

## 2025-05-08 DIAGNOSIS — N81.4 CYSTOCELE WITH UTERINE PROLAPSE: ICD-10-CM

## 2025-05-08 DIAGNOSIS — T83.9XXA PROBLEM WITH VAGINAL PESSARY, INITIAL ENCOUNTER: Primary | ICD-10-CM

## 2025-05-08 DIAGNOSIS — N81.89 LOSS OF PERINEAL BODY, FEMALE: ICD-10-CM

## 2025-05-08 PROCEDURE — 57160 INSERT PESSARY/OTHER DEVICE: CPT | Performed by: OBSTETRICS & GYNECOLOGY

## 2025-05-08 PROCEDURE — 99282 EMERGENCY DEPT VISIT SF MDM: CPT

## 2025-05-08 PROCEDURE — A4561 PESSARY RUBBER, ANY TYPE: HCPCS | Performed by: OBSTETRICS & GYNECOLOGY

## 2025-05-08 RX ORDER — CHLORAL HYDRATE 500 MG
1 CAPSULE ORAL DAILY
COMMUNITY
Start: 2025-04-02

## 2025-05-08 ASSESSMENT — PAIN SCALES - GENERAL: PAINLEVEL_OUTOF10: 5

## 2025-05-08 ASSESSMENT — PAIN DESCRIPTION - PAIN TYPE: TYPE: ACUTE PAIN

## 2025-05-08 ASSESSMENT — PAIN DESCRIPTION - LOCATION: LOCATION: VAGINA

## 2025-05-08 ASSESSMENT — PAIN - FUNCTIONAL ASSESSMENT
PAIN_FUNCTIONAL_ASSESSMENT: ACTIVITIES ARE NOT PREVENTED
PAIN_FUNCTIONAL_ASSESSMENT: 0-10

## 2025-05-08 NOTE — PROGRESS NOTES
Ashanti Lucia  2025  2:15 PM    PESSARY FITTING APPOINTMENT      Ashanti Lucia is a 74 y.o. female       The patient was seen. She was fitted with a Gehrung which she removed. A smaller Gehrung was not available at this site. Two smaller Gehrung's were obtained from my other [facility] office and the pt is her for fitting today. She has been fitted for a # 4; Gehrung type pessary.  She states all of her symptoms that she had prior to the pessary have been relieved with its use.  She denies any vaginal bleeding.  She denies to any vaginal discharge or odor. Her bowels are regular and her voiding pattern is normal.     Blood pressure 128/86, pulse 62, resp. rate 16, height 1.74 m (5' 8.5\"), weight 97.5 kg (215 lb), not currently breastfeeding.    Chaperone for Intimate Exam  Chaperone was offered and accepted as part of the rooming process.  Chaperone: Teresa        Abdomen: Soft and non-tender; good bowel sounds; no guarding, rebound or rigidity; no CVA tenderness bilaterally.    Extremities: No calf tenderness bilaterally. DTR 2/4 bilaterally. No edema.    Perineum/Speculum: There is not any signs of infection; The vaginal vault is without any signs of erythema or erosion. There is no vaginal discharge or odor appreciated.    The pessary was placed without any problems and the patient tolerated the procedure well. T.O.S. Ointment was placed with the pessary to decrease discharge/odor.    Assessment:   Diagnosis Orders   1. Encounter for fitting and adjustment of pessary  FIT/INSERT INTRAVAG SUPPORT DEVICE    HI PESSARY REUSABLE RUB ANYTYPE      2. Cystocele 3-4 with valsalva with uterine prolapse grade 2  FIT/INSERT INTRAVAG SUPPORT DEVICE    HI PESSARY REUSABLE RUB ANYTYPE      3. Pelvic relaxation        4. Loss of perineal body, female        5. Overflow incontinence          Chief Complaint   Patient presents with    Follow-up     Pessary fitting       Patient Active Problem List

## 2025-05-09 ENCOUNTER — TELEPHONE (OUTPATIENT)
Dept: OBGYN | Age: 75
End: 2025-05-09

## 2025-05-09 NOTE — TELEPHONE ENCOUNTER
Patient called and reports that she was in the ED last night to have pessary removed. States that it became displaced and it was removed in the ED. Patient does not want to do surgery and is unsure about trying another pessary at this time. Will inform provider on next day in office.

## 2025-05-09 NOTE — ED PROVIDER NOTES
[facility] Emergency Department  [address] [facility] [address]  Phone: [phone]      Patient Name:  Ashanti Lucia  Medical Record Number:  [id number]  YOB: 1950  Date of Service:  5/8/2025  Primary Care Physician:  Lilian Chen MD      CHIEF COMPLAINT:       Chief Complaint   Patient presents with    Pessary Concerns       HISTORY OF PRESENT ILLNESS:    Ashanti Lucia is a 74 y.o. female who presents with the complaint of pessary problem    74-year-old female with history of bladder prolapse who just had a fitting for pessary today.  She thought it was proper fit and was working for her but tonight it got loosened fell into the vagina.  This is the second 1 that has not been feeling well and she removed the first 1 herself.  Her doctor told her not to do it herself in the future to avoid injury.  She is come to have it removed.    CURRENT MEDICATIONS:      Discharge Medication List as of 5/8/2025  8:48 PM        CONTINUE these medications which have NOT CHANGED    Details   Omega-3 1000 MG CAPS Take 1 capsule by mouth dailyHistorical Med      triamcinolone (KENALOG) 0.025 % cream Apply Topically daily as needed for itching, Disp-80 g, R-1, Normal      hydrOXYzine HCl (ATARAX) 25 MG tablet Take 1 tablet by mouth nightly as needed for Anxiety, Disp-30 tablet, R-3Normal      FLUoxetine (PROZAC) 40 MG capsule Take 1 capsule by mouth daily, Disp-90 capsule, R-1Normal      albuterol (PROVENTIL) (2.5 MG/3ML) 0.083% nebulizer solution Take 3 mLs by nebulization every 4 hours as needed for Wheezing or Shortness of Breath, Disp-125 mL, R-1Normal      Vitamin D-Vitamin K (D3 + K2 DOTS) 1000-90 UNIT-MCG TABS Take 1 capsule by mouth dailyHistorical Med      fluocinonide (LIDEX) 0.05 % external solution Apply topically to scalp for itch up to twice a day as needed, Historical Med      UNABLE TO FIND Patient needs a bracelet marked DNR, Disp-1 each, R-0Print      flecainide (TAMBOCOR)

## 2025-05-09 NOTE — DISCHARGE INSTRUCTIONS
Please notify your OB/GYN in the morning about the removal of it and make follow-up to discuss further options.

## 2025-06-19 ENCOUNTER — HOSPITAL ENCOUNTER (OUTPATIENT)
Dept: GENERAL RADIOLOGY | Age: 75
Discharge: HOME | End: 2025-06-21
Payer: MEDICARE

## 2025-06-19 ENCOUNTER — OFFICE VISIT (OUTPATIENT)
Dept: FAMILY MEDICINE CLINIC | Age: 75
End: 2025-06-19
Payer: MEDICARE

## 2025-06-19 VITALS
BODY MASS INDEX: 32.43 KG/M2 | HEIGHT: 68 IN | DIASTOLIC BLOOD PRESSURE: 78 MMHG | HEART RATE: 61 BPM | OXYGEN SATURATION: 96 % | RESPIRATION RATE: 16 BRPM | SYSTOLIC BLOOD PRESSURE: 124 MMHG | WEIGHT: 214 LBS

## 2025-06-19 DIAGNOSIS — Z91.81 AT HIGH RISK FOR FALLS: ICD-10-CM

## 2025-06-19 DIAGNOSIS — M54.6 ACUTE MIDLINE THORACIC BACK PAIN: ICD-10-CM

## 2025-06-19 DIAGNOSIS — M54.2 ACUTE NECK PAIN: ICD-10-CM

## 2025-06-19 DIAGNOSIS — H92.01 OTALGIA OF RIGHT EAR: ICD-10-CM

## 2025-06-19 DIAGNOSIS — M54.6 ACUTE MIDLINE THORACIC BACK PAIN: Primary | ICD-10-CM

## 2025-06-19 PROCEDURE — 1036F TOBACCO NON-USER: CPT | Performed by: NURSE PRACTITIONER

## 2025-06-19 PROCEDURE — 1090F PRES/ABSN URINE INCON ASSESS: CPT | Performed by: NURSE PRACTITIONER

## 2025-06-19 PROCEDURE — G8399 PT W/DXA RESULTS DOCUMENT: HCPCS | Performed by: NURSE PRACTITIONER

## 2025-06-19 PROCEDURE — 3017F COLORECTAL CA SCREEN DOC REV: CPT | Performed by: NURSE PRACTITIONER

## 2025-06-19 PROCEDURE — 1123F ACP DISCUSS/DSCN MKR DOCD: CPT | Performed by: NURSE PRACTITIONER

## 2025-06-19 PROCEDURE — G8417 CALC BMI ABV UP PARAM F/U: HCPCS | Performed by: NURSE PRACTITIONER

## 2025-06-19 PROCEDURE — 1159F MED LIST DOCD IN RCRD: CPT | Performed by: NURSE PRACTITIONER

## 2025-06-19 PROCEDURE — 72040 X-RAY EXAM NECK SPINE 2-3 VW: CPT

## 2025-06-19 PROCEDURE — 99213 OFFICE O/P EST LOW 20 MIN: CPT | Performed by: NURSE PRACTITIONER

## 2025-06-19 PROCEDURE — G8427 DOCREV CUR MEDS BY ELIG CLIN: HCPCS | Performed by: NURSE PRACTITIONER

## 2025-06-19 PROCEDURE — 72072 X-RAY EXAM THORAC SPINE 3VWS: CPT

## 2025-06-19 RX ORDER — PREDNISONE 20 MG/1
20 TABLET ORAL 2 TIMES DAILY
Qty: 10 TABLET | Refills: 0 | Status: SHIPPED | OUTPATIENT
Start: 2025-06-19 | End: 2025-06-24

## 2025-06-19 RX ORDER — TRAMADOL HYDROCHLORIDE 50 MG/1
50 TABLET ORAL EVERY 6 HOURS PRN
Qty: 12 TABLET | Refills: 0 | Status: SHIPPED | OUTPATIENT
Start: 2025-06-19 | End: 2025-06-22

## 2025-06-19 RX ORDER — TIZANIDINE 2 MG/1
2 TABLET ORAL 3 TIMES DAILY PRN
Qty: 30 TABLET | Refills: 0 | Status: SHIPPED | OUTPATIENT
Start: 2025-06-19

## 2025-06-19 ASSESSMENT — PATIENT HEALTH QUESTIONNAIRE - PHQ9
2. FEELING DOWN, DEPRESSED OR HOPELESS: NOT AT ALL
SUM OF ALL RESPONSES TO PHQ QUESTIONS 1-9: 0
9. THOUGHTS THAT YOU WOULD BE BETTER OFF DEAD, OR OF HURTING YOURSELF: NOT AT ALL
8. MOVING OR SPEAKING SO SLOWLY THAT OTHER PEOPLE COULD HAVE NOTICED. OR THE OPPOSITE, BEING SO FIGETY OR RESTLESS THAT YOU HAVE BEEN MOVING AROUND A LOT MORE THAN USUAL: NOT AT ALL
4. FEELING TIRED OR HAVING LITTLE ENERGY: NOT AT ALL
3. TROUBLE FALLING OR STAYING ASLEEP: NOT AT ALL
7. TROUBLE CONCENTRATING ON THINGS, SUCH AS READING THE NEWSPAPER OR WATCHING TELEVISION: NOT AT ALL
5. POOR APPETITE OR OVEREATING: NOT AT ALL
10. IF YOU CHECKED OFF ANY PROBLEMS, HOW DIFFICULT HAVE THESE PROBLEMS MADE IT FOR YOU TO DO YOUR WORK, TAKE CARE OF THINGS AT HOME, OR GET ALONG WITH OTHER PEOPLE: NOT DIFFICULT AT ALL
1. LITTLE INTEREST OR PLEASURE IN DOING THINGS: NOT AT ALL
SUM OF ALL RESPONSES TO PHQ QUESTIONS 1-9: 0
6. FEELING BAD ABOUT YOURSELF - OR THAT YOU ARE A FAILURE OR HAVE LET YOURSELF OR YOUR FAMILY DOWN: NOT AT ALL

## 2025-06-19 NOTE — PROGRESS NOTES
images.     Objective:/78   Pulse 61   Resp 16   Ht 1.727 m (5' 8\")   Wt 97.1 kg (214 lb)   SpO2 96%   BMI 32.54 kg/m²     Physical Exam  Vitals and nursing note reviewed.   Constitutional:       General: She is not in acute distress.     Appearance: Normal appearance. She is not ill-appearing.   HENT:      Head: Normocephalic.      Right Ear: Tympanic membrane normal.      Left Ear: Tympanic membrane normal.   Cardiovascular:      Rate and Rhythm: Normal rate and regular rhythm.      Heart sounds: Normal heart sounds.   Pulmonary:      Effort: Pulmonary effort is normal.      Breath sounds: Normal breath sounds.   Musculoskeletal:      Cervical back: Neck supple. Tenderness and bony tenderness present. Spinous process tenderness and muscular tenderness present. Decreased range of motion.      Thoracic back: Spasms, tenderness and bony tenderness present.        Back:       Right lower leg: No edema.      Left lower leg: No edema.   Skin:     General: Skin is warm and dry.      Findings: No rash.   Neurological:      General: No focal deficit present.      Mental Status: She is alert and oriented to person, place, and time.          Assessment:      1. Acute midline thoracic back pain    2. At high risk for falls    3. Acute neck pain    4. Otalgia of right ear           Plan:   Assessment & Plan         Assessment & Plan  1. Acute/chronic back and upper neck pain.  - Worsening pain over the last couple of weeks, current pain level 7/10.  - Increased pain and limited mobility; tenderness in muscles and trapezius area.  - X-ray of the upper back and neck ordered to rule out compression fractures or other abnormalities.  - Prednisone prescribed for inflammation; muscle relaxer and tramadol prescribed for pain and muscle spasms.    2. Right ear pain.  - Ongoing right ear pain for a few weeks, described as a weird sensation.  - No drainage; chronic ringing in the ear.  - Prednisone prescribed to address

## 2025-07-02 ENCOUNTER — RESULTS FOLLOW-UP (OUTPATIENT)
Dept: FAMILY MEDICINE CLINIC | Age: 75
End: 2025-07-02

## 2025-07-17 NOTE — TELEPHONE ENCOUNTER
Ashanti called requesting a refill of the below medication which has been pended for you:     Requested Prescriptions     Pending Prescriptions Disp Refills    FLUoxetine (PROZAC) 40 MG capsule [Pharmacy Med Name: FLUOXETINE 40MG CAPSULES] 90 capsule 0     Sig: TAKE 1 CAPSULE BY MOUTH DAILY       Last Appointment Date: 4/17/2025  Next Appointment Date: 10/16/2025    Allergies   Allergen Reactions    Celebrex [Celecoxib] Rash     Whole body    Codeine Other (See Comments)     Gives her a headache

## 2025-07-20 RX ORDER — FLUOXETINE HYDROCHLORIDE 40 MG/1
40 CAPSULE ORAL DAILY
Qty: 90 CAPSULE | Refills: 3 | Status: SHIPPED | OUTPATIENT
Start: 2025-07-20

## 2025-07-28 ENCOUNTER — OFFICE VISIT (OUTPATIENT)
Dept: PRIMARY CARE CLINIC | Age: 75
End: 2025-07-28
Payer: MEDICARE

## 2025-07-28 ENCOUNTER — HOSPITAL ENCOUNTER (OUTPATIENT)
Age: 75
Setting detail: SPECIMEN
Discharge: HOME | End: 2025-07-28
Payer: MEDICARE

## 2025-07-28 VITALS
TEMPERATURE: 97.3 F | SYSTOLIC BLOOD PRESSURE: 126 MMHG | HEART RATE: 60 BPM | RESPIRATION RATE: 18 BRPM | HEIGHT: 68 IN | WEIGHT: 215 LBS | OXYGEN SATURATION: 92 % | BODY MASS INDEX: 32.58 KG/M2 | DIASTOLIC BLOOD PRESSURE: 80 MMHG

## 2025-07-28 DIAGNOSIS — N30.01 ACUTE CYSTITIS WITH HEMATURIA: Primary | ICD-10-CM

## 2025-07-28 DIAGNOSIS — R30.0 DYSURIA: ICD-10-CM

## 2025-07-28 LAB
BACTERIA URNS QL MICRO: ABNORMAL
BILIRUB UR QL STRIP: NEGATIVE
CHARACTER UR: ABNORMAL
CLARITY UR: ABNORMAL
COLOR UR: YELLOW
EPI CELLS #/AREA URNS HPF: ABNORMAL /HPF (ref 0–5)
GLUCOSE UR STRIP-MCNC: NEGATIVE MG/DL
HGB UR QL STRIP.AUTO: ABNORMAL
KETONES UR STRIP-MCNC: NEGATIVE MG/DL
LEUKOCYTE ESTERASE UR QL STRIP: ABNORMAL
NITRITE UR QL STRIP: POSITIVE
PH UR STRIP: 7 [PH] (ref 5–6)
PROT UR STRIP-MCNC: ABNORMAL MG/DL
RBC #/AREA URNS HPF: ABNORMAL /HPF (ref 0–4)
SP GR UR STRIP: 1.02 (ref 1.01–1.02)
UROBILINOGEN UR STRIP-ACNC: NORMAL EU/DL (ref 0–1)
WBC #/AREA URNS HPF: ABNORMAL /HPF (ref 0–4)

## 2025-07-28 PROCEDURE — 1160F RVW MEDS BY RX/DR IN RCRD: CPT | Performed by: PHYSICIAN ASSISTANT

## 2025-07-28 PROCEDURE — 99213 OFFICE O/P EST LOW 20 MIN: CPT | Performed by: PHYSICIAN ASSISTANT

## 2025-07-28 PROCEDURE — G8417 CALC BMI ABV UP PARAM F/U: HCPCS | Performed by: PHYSICIAN ASSISTANT

## 2025-07-28 PROCEDURE — G8399 PT W/DXA RESULTS DOCUMENT: HCPCS | Performed by: PHYSICIAN ASSISTANT

## 2025-07-28 PROCEDURE — 87086 URINE CULTURE/COLONY COUNT: CPT

## 2025-07-28 PROCEDURE — 87186 SC STD MICRODIL/AGAR DIL: CPT

## 2025-07-28 PROCEDURE — 81001 URINALYSIS AUTO W/SCOPE: CPT

## 2025-07-28 PROCEDURE — 3017F COLORECTAL CA SCREEN DOC REV: CPT | Performed by: PHYSICIAN ASSISTANT

## 2025-07-28 PROCEDURE — 1159F MED LIST DOCD IN RCRD: CPT | Performed by: PHYSICIAN ASSISTANT

## 2025-07-28 PROCEDURE — G8427 DOCREV CUR MEDS BY ELIG CLIN: HCPCS | Performed by: PHYSICIAN ASSISTANT

## 2025-07-28 PROCEDURE — 1036F TOBACCO NON-USER: CPT | Performed by: PHYSICIAN ASSISTANT

## 2025-07-28 PROCEDURE — 1123F ACP DISCUSS/DSCN MKR DOCD: CPT | Performed by: PHYSICIAN ASSISTANT

## 2025-07-28 PROCEDURE — 87088 URINE BACTERIA CULTURE: CPT

## 2025-07-28 PROCEDURE — 1090F PRES/ABSN URINE INCON ASSESS: CPT | Performed by: PHYSICIAN ASSISTANT

## 2025-07-28 RX ORDER — CEPHALEXIN 500 MG/1
500 CAPSULE ORAL 2 TIMES DAILY
Qty: 14 CAPSULE | Refills: 0 | Status: SHIPPED | OUTPATIENT
Start: 2025-07-28 | End: 2025-08-04

## 2025-07-28 RX ORDER — AMOXICILLIN 500 MG/1
CAPSULE ORAL
COMMUNITY
Start: 2025-07-15 | End: 2025-07-28

## 2025-07-28 ASSESSMENT — ENCOUNTER SYMPTOMS
VOMITING: 0
NAUSEA: 0

## 2025-07-28 NOTE — PROGRESS NOTES
[facility] Walk In department of [facility]  [address] [facility] [address]  Phone: [phone]  Fax: [phone]      Ashanti Lucia  1950  MRN: [id number]  Date of visit: 7/28/2025    Chief Complaint:     Ashanti Lucia is here for c/o of Dysuria (Pt c/o frequency with urination, dysuria and cramping x 3 days)      HPI:     Ashanti Lucia is a 74 y.o. female who presents to the [facility] today for her medical conditions/complaints as noted below.    History of Present Illness  The patient is a 74-year-old female presenting today due to increased urinary frequency, dysuria, and cramping. Her symptoms began 3 days ago.    She has been experiencing increased urinary frequency, burning sensation during urination, and cramping for the past 3 days. She also reports an increased urgency to urinate but does not notice any blood in her urine. She is not experiencing fever, chills, nausea, vomiting, or dizziness. Although she suffers from daily headaches, she does not believe they are related to her current symptoms. She has a history of frequent urinary tract infections.    Past Medical History:   Diagnosis Date    Allergic rhinitis     Anxiety     Asthma     Bronchitis     Chest pain     Chronic back pain     Depression     Fibromyalgia     GERD (gastroesophageal reflux disease)     Headache(784.0)     Heart palpitations     Irregular bowel habits     Macular degeneration     beginning    Measles     Osteoarthritis     Restless legs syndrome     Shingles 02/2025    Tachycardia     Unspecified sleep apnea     Wet senile macular degeneration (HCC)     bilateral- Sees a retinal Dr Christopher        Allergies   Allergen Reactions    Celebrex [Celecoxib] Rash     Whole body    Codeine Other (See Comments)     Gives her a headache         Subjective:      Review of Systems   Constitutional:  Negative for chills and fever.   Gastrointestinal:  Negative for

## 2025-07-30 LAB
MICROORGANISM SPEC CULT: ABNORMAL
SPECIMEN DESCRIPTION: ABNORMAL

## 2025-08-08 DIAGNOSIS — F41.1 ANXIETY STATE: Primary | ICD-10-CM

## 2025-08-08 RX ORDER — HYDROXYZINE HYDROCHLORIDE 25 MG/1
25 TABLET, FILM COATED ORAL NIGHTLY PRN
Qty: 30 TABLET | Refills: 3 | Status: SHIPPED | OUTPATIENT
Start: 2025-08-08

## (undated) DEVICE — GLOVE ORANGE PI 8   MSG9080

## (undated) DEVICE — TOWEL,OR,DSP,ST,BLUE,STD,4/PK,20PK/CS: Brand: MEDLINE

## (undated) DEVICE — CONTAINER,SPECIMEN,4OZ,OR STRL: Brand: MEDLINE

## (undated) DEVICE — Z DISCONTINUED BY MEDLINE USE 2711682 TRAY SKIN PREP DRY W/ PREM GLV

## (undated) DEVICE — PACK,LITHOTOMY,PK I: Brand: MEDLINE

## (undated) DEVICE — GLOVE SURG SZ 6 THK91MIL LTX FREE SYN POLYISOPRENE ANTI

## (undated) DEVICE — Z INACTIVE NO SUPPLIER SOLUTIONIRRIG 3000ML 0.9% SOD CHL FLX CONT [79720808] [HOSPIRA WORLDWIDE INC]

## (undated) DEVICE — GLOVE ORANGE PI 7 1/2   MSG9075

## (undated) DEVICE — 3000CC GUARDIAN II: Brand: GUARDIAN

## (undated) DEVICE — SOLUTION SCRB 4OZ 7.5% POVIDONE IOD FLIP TOP CAP

## (undated) DEVICE — COVER LT HNDL BLU PLAS

## (undated) DEVICE — GAUZE,SPONGE,4"X4",16PLY,XRAY,STRL,LF: Brand: MEDLINE

## (undated) DEVICE — JELLY,LUBE,STERILE,FLIP TOP,TUBE,4-OZ: Brand: MEDLINE

## (undated) DEVICE — GLOVE ORANGE PI 7   MSG9070

## (undated) DEVICE — CYSTO/BLADDER IRRIGATION SET, REGULATING CLAMP

## (undated) DEVICE — POUCH INSTR W40XL26IN BUTT FLD COLL FLTR DRNGE PRT

## (undated) DEVICE — CATHETER URETH STR TIP 16 FRX12 IN SMOOTH RND DOVER

## (undated) DEVICE — 3000ML,PRESSURE INFUSER W/THUMBWHEEL: Brand: MEDLINE

## (undated) DEVICE — MEDI-VAC NON-CONDUCTIVE SUCTION TUBING: Brand: CARDINAL HEALTH

## (undated) DEVICE — Z DISCONTINUED USE 2273271 SOLUTION PREP 4OZ 10% POVIDONE IOD BTL FLIP TOP CAP

## (undated) DEVICE — FLUID MGMT SYS FLUENT KIT 6/PK

## (undated) DEVICE — 1000ML,PRESSURE INFUSER W/STOPCOCK: Brand: MEDLINE

## (undated) DEVICE — PAD N ADH W3XL4IN POLY COT SFT PERF FLM EASILY CUT ABSRB

## (undated) DEVICE — PAD MATERNITY CURITY ADH STRIP DISP

## (undated) DEVICE — JELLY LUBRICATING 4OZ FLIP TOP TB E Z

## (undated) DEVICE — SVMMC GYN MIN PK

## (undated) DEVICE — GOWN,AURORA,NONREINFORCED,LARGE: Brand: MEDLINE

## (undated) DEVICE — TRAP TISS FLUENT FLD MGMT SYS

## (undated) DEVICE — SOLUTION PREP 8OZ 10% POVIDONE IOD UNSCENTED SCR TOP BTL

## (undated) DEVICE — BAG WASTE MYSOSURE FLUENT

## (undated) DEVICE — GARMENT,MEDLINE,DVT,INT,CALF,MED, GEN2: Brand: MEDLINE

## (undated) DEVICE — PAD,SANITARY,11 IN,MAXI,W/WINGS,N-STRL: Brand: MEDLINE

## (undated) DEVICE — Z INACTIVE USE 2660664 SOLUTION IRRIG 3000ML 0.9% SOD CHL USP UROMATIC PLAS CONT

## (undated) DEVICE — CRANIOTOMY DRAPE, STERILE: Brand: MEDLINE

## (undated) DEVICE — GLOVE SURG SZ 8 CRM LTX FREE POLYISOPRENE POLYMER BEAD ANTI

## (undated) DEVICE — SYRINGE, LUER LOCK, 10ML: Brand: MEDLINE

## (undated) DEVICE — SOLUTION SCRB 4OZ 10% POVIDONE IOD ANTIMIC BTL

## (undated) DEVICE — SOLUTION IV IRRIG POUR BRL 0.9% SODIUM CHL 2F7124

## (undated) DEVICE — TUBING, SUCTION, 9/32" X 20', STRAIGHT: Brand: MEDLINE INDUSTRIES, INC.

## (undated) DEVICE — GOWN,AURORA,NONRNF,XL,30/CS: Brand: MEDLINE

## (undated) DEVICE — GLOVE SURG SZ 7 L12IN THK7.5MIL DK GRN LTX FREE MSG6570] MEDLINE INDUSTRIES INC]